# Patient Record
Sex: FEMALE | Race: WHITE | NOT HISPANIC OR LATINO | Employment: OTHER | URBAN - METROPOLITAN AREA
[De-identification: names, ages, dates, MRNs, and addresses within clinical notes are randomized per-mention and may not be internally consistent; named-entity substitution may affect disease eponyms.]

---

## 2018-09-04 ENCOUNTER — OFFICE VISIT (OUTPATIENT)
Dept: OPTOMETRY | Facility: CLINIC | Age: 63
End: 2018-09-04
Payer: COMMERCIAL

## 2018-09-04 DIAGNOSIS — H52.223 REGULAR ASTIGMATISM OF BOTH EYES: Primary | ICD-10-CM

## 2018-09-04 DIAGNOSIS — H52.4 PRESBYOPIA: ICD-10-CM

## 2018-09-04 PROCEDURE — 92015 DETERMINE REFRACTIVE STATE: CPT | Performed by: OPTOMETRIST

## 2018-09-04 PROCEDURE — 92014 COMPRE OPH EXAM EST PT 1/>: CPT | Performed by: OPTOMETRIST

## 2018-09-04 ASSESSMENT — VISUAL ACUITY
OD_SC+: -3
OD_CC+: -1
OS_CC: 20/25
OS_CC+: -1
OS_SC+: -1
METHOD: SNELLEN - LINEAR
OS_SC: 20/100
OS_SC: 20/50
OD_SC: 20/30
OD_SC: 20/50
CORRECTION_TYPE: GLASSES
OD_CC: 20/25

## 2018-09-04 ASSESSMENT — REFRACTION_MANIFEST
OD_SPHERE: -1.75
OS_CYLINDER: +1.75
OD_CYLINDER: +1.00
OS_AXIS: 160
METHOD_AUTOREFRACTION: 1
OD_ADD: +2.25
OS_CYLINDER: +1.25
OS_SPHERE: -1.00
OD_SPHERE: -1.75
OD_AXIS: 005
OD_CYLINDER: +0.50
OD_AXIS: 006
OS_AXIS: 165
OS_ADD: +2.25
OS_SPHERE: -1.25

## 2018-09-04 ASSESSMENT — REFRACTION_WEARINGRX
OD_SPHERE: -1.50
OD_CYLINDER: +0.75
OS_CYLINDER: +0.75
SPECS_TYPE: SVL DISTANCE
OS_SPHERE: +0.25
OD_AXIS: 010
OS_AXIS: 155
OD_CYLINDER: +0.75
OS_CYLINDER: +0.75
OD_SPHERE: -0.25
OS_AXIS: 155
OS_SPHERE: -1.00
OD_AXIS: 010

## 2018-09-04 ASSESSMENT — CONF VISUAL FIELD
METHOD: COUNTING FINGERS
OS_NORMAL: 1

## 2018-09-04 ASSESSMENT — TONOMETRY
OD_IOP_MMHG: 12
OS_IOP_MMHG: 12
IOP_METHOD: APPLANATION

## 2018-09-04 ASSESSMENT — KERATOMETRY
OS_K2POWER_DIOPTERS: 43.50
OS_AXISANGLE2_DEGREES: 139
OD_K1POWER_DIOPTERS: 44.25
OD_K2POWER_DIOPTERS: 44.75
OS_K1POWER_DIOPTERS: 43.75
OD_AXISANGLE2_DEGREES: 2

## 2018-09-04 ASSESSMENT — SLIT LAMP EXAM - LIDS
COMMENTS: NORMAL
COMMENTS: NORMAL

## 2018-09-04 ASSESSMENT — CUP TO DISC RATIO
OD_RATIO: 0.1
OS_RATIO: 0.1

## 2018-09-04 ASSESSMENT — EXTERNAL EXAM - LEFT EYE: OS_EXAM: NORMAL

## 2018-09-04 ASSESSMENT — EXTERNAL EXAM - RIGHT EYE: OD_EXAM: NORMAL

## 2018-09-04 NOTE — PATIENT INSTRUCTIONS
Patient was advised of today's exam findings.  Fill glasses prescription  Return in 1 year for eye exam    Sara Liu O.D.  [facility]   [address] [place], [place] [id number] [phone]

## 2018-09-04 NOTE — MR AVS SNAPSHOT
"              After Visit Summary   9/4/2018    Kaykay Rockwell    MRN: [id number]           Patient Information     Date Of Birth          1955        Visit Information        Provider Department      9/4/2018 9:40 AM Sara Liu OD [facility]        Today's Diagnoses     Regular astigmatism of both eyes    -  1    Presbyopia          Care Instructions    Patient was advised of today's exam findings.  Fill glasses prescription  Return in 1 year for eye exam    Sara Liu O.D.  [facility]   [address] [place] [address]  [phone]            Follow-ups after your visit        Who to contact     If you have questions or need follow up information about today's clinic visit or your schedule please contact [facility] directly at [phone].  Normal or non-critical lab and imaging results will be communicated to you by MyChart, letter or phone within 4 business days after the clinic has received the results. If you do not hear from us within 7 days, please contact the clinic through MyChart or phone. If you have a critical or abnormal lab result, we will notify you by phone as soon as possible.  Submit refill requests through [facility] or call your pharmacy and they will forward the refill request to us. Please allow 3 business days for your refill to be completed.          Additional Information About Your Visit        MyChart Information     [facility] lets you send messages to your doctor, view your test results, renew your prescriptions, schedule appointments and more. To sign up, go to [email or web address] . Click on \"Log in\" on the left side of the screen, which will take you to the Welcome page. Then click on \"Sign up Now\" on the right side of the page.     You will be asked to enter the access code listed below, as well as some personal information. Please follow the directions to create your username and password.     Your access code is: " [email or web address]  Expires: 12/3/2018 11:19 AM     Your access code will  in 90 days. If you need help or a new code, please call your [facility]3] or [phone].        Care EveryWhere ID     This is your Care EveryWhere ID. This could be used by other organizations to access your [place] medical records  [id number]         Blood Pressure from Last 3 Encounters:   14 130/81   03/24/10 116/76    Weight from Last 3 Encounters:   14 81.6 kg (180 lb)              Today, you had the following     No orders found for display       Primary Care Provider Office Phone # Fax #    [facility] [phone] [address] [facility] [address]        Equal Access to Services     HERBER HELM : Hadii riaz stewart hadasho Soomaali, waaxda luqadaha, qaybta kaalmada adeegyada, ibrahima craig hayjose francisco granados . So [facility] [phone].    ATENCIÓN: Si habla español, tiene a saldivar disposición servicios gratuitos de asistencia lingüística. Llame al [phone].    We comply with applicable federal civil rights laws and [place] laws. We do not discriminate on the basis of race, color, national origin, age, disability, sex, sexual orientation, or gender identity.            Thank you!     Thank you for choosing [facility]  for your care. Our goal is always to provide you with excellent care. Hearing back from our patients is one way we can continue to improve our services. Please take a few minutes to complete the written survey that you may receive in the mail after your visit with us. Thank you!             Your Updated Medication List - Protect others around you: Learn how to safely use, store and throw away your medicines at www.disposemymeds.org.          This list is accurate as of 18 11:19 AM.  Always use your most recent med list.                   Brand Name Dispense Instructions for use Diagnosis    ADVIL 200 MG tablet   Generic drug:  ibuprofen      Take  400 mg by mouth every 4 hours as needed for mild pain        aspirin 81 MG tablet      Take 1 tablet by mouth daily.        MULTI FOR HER 50+ PO      Take 1 tablet by mouth.

## 2018-09-04 NOTE — PROGRESS NOTES
Chief Complaint   Patient presents with     COMPREHENSIVE EYE EXAM         Last Eye Exam: 6/17/2015  Dilated Previously: Yes    What are you currently using to see?  Glasses, has 2 pairs. One that she wears to drive and for distance tasks and one that she rarely uses to read. She did not bring the reading pair to this visit        Distance Vision Acuity: Satisfied with vision, no changes, glasses still seem to work     Near Vision Acuity: Satisfied with vision while reading and using computer unaided and rarely for smaller print as needed   Eye Comfort: good  Do you use eye drops? : No  Occupation or Hobbies: Retired     [employer]           Medical, surgical and family histories reviewed and updated 9/4/2018.     Mother - glaucoma and macular degeneration     OBJECTIVE: See Ophthalmology exam    ASSESSMENT:    ICD-10-CM    1. Regular astigmatism of both eyes H52.223 EYE EXAM (SIMPLE-NONBILLABLE)     REFRACTION   2. Presbyopia H52.4 EYE EXAM (SIMPLE-NONBILLABLE)     REFRACTION      PLAN:     Patient Instructions   Patient was advised of today's exam findings.  Fill glasses prescription  Return in 1 year for eye exam    Sara Liu O.D.  [facility]   [address] [place] [address]  [phone]

## 2021-03-13 ENCOUNTER — HEALTH MAINTENANCE LETTER (OUTPATIENT)
Age: 66
End: 2021-03-13

## 2021-10-23 ENCOUNTER — HEALTH MAINTENANCE LETTER (OUTPATIENT)
Age: 66
End: 2021-10-23

## 2022-04-09 ENCOUNTER — HEALTH MAINTENANCE LETTER (OUTPATIENT)
Age: 67
End: 2022-04-09

## 2022-10-09 ENCOUNTER — HEALTH MAINTENANCE LETTER (OUTPATIENT)
Age: 67
End: 2022-10-09

## 2022-11-03 ENCOUNTER — OFFICE VISIT (OUTPATIENT)
Dept: FAMILY MEDICINE | Facility: CLINIC | Age: 67
End: 2022-11-03
Payer: COMMERCIAL

## 2022-11-03 VITALS
HEIGHT: 65 IN | WEIGHT: 134 LBS | TEMPERATURE: 97.4 F | HEART RATE: 92 BPM | SYSTOLIC BLOOD PRESSURE: 142 MMHG | BODY MASS INDEX: 22.33 KG/M2 | OXYGEN SATURATION: 97 % | DIASTOLIC BLOOD PRESSURE: 76 MMHG

## 2022-11-03 DIAGNOSIS — J01.00 ACUTE NON-RECURRENT MAXILLARY SINUSITIS: ICD-10-CM

## 2022-11-03 DIAGNOSIS — L98.9 NON-HEALING SKIN LESION: Primary | ICD-10-CM

## 2022-11-03 PROCEDURE — 99203 OFFICE O/P NEW LOW 30 MIN: CPT | Performed by: PHYSICIAN ASSISTANT

## 2022-11-03 RX ORDER — CEFUROXIME AXETIL 250 MG/1
250 TABLET ORAL 2 TIMES DAILY
Qty: 20 TABLET | Refills: 0 | Status: SHIPPED | OUTPATIENT
Start: 2022-11-03 | End: 2022-11-13

## 2022-11-03 ASSESSMENT — PAIN SCALES - GENERAL: PAINLEVEL: NO PAIN (0)

## 2022-11-03 NOTE — NURSING NOTE
"Chief Complaint   Patient presents with     Derm Problem       Initial BP (!) 166/89   Pulse 92   Temp 97.4  F (36.3  C) (Tympanic)   Ht 1.651 m (5' 5\")   Wt 60.8 kg (134 lb)   SpO2 97%   BMI 22.30 kg/m   Estimated body mass index is 22.3 kg/m  as calculated from the following:    Height as of this encounter: 1.651 m (5' 5\").    Weight as of this encounter: 60.8 kg (134 lb).  Medication Reconciliation: complete    ISIDRO Joya MA    "

## 2022-11-03 NOTE — PROGRESS NOTES
Assessment & Plan     Non-healing skin lesion  Multiple nonhealing lesions. She was unaware of the lesions on her scalp until today.   Unclear cause of the lesions, she is going to need consultation and possible biopsy per Dermatology.   - Adult Dermatology Referral; Future    Acute non-recurrent maxillary sinusitis  Sinus symptoms for over 2 weeks.   Treat with antibiotic.   Continue with symptomatic treatments with OTC medications also, rest and fluids.   - cefuroxime (CEFTIN) 250 MG tablet; Take 1 tablet (250 mg) by mouth 2 times daily for 10 days    She is due for routine preventative health exam.   Blood pressure was rechecked and significantly improved.   She has the ability to check at home and will keep a log.   She was scheduled for follow up and routine exam.              Return in about 3 weeks (around 11/24/2022) for recheck, Routine Visit.    Kristen M. Kehr, PA-C [facility]   Janee is a 67 year old, presenting for the following health issues:  Derm Problem and Headache    Janee is here today for skin changes that are occurring on her right leg. She was working in her yard in 5 months ago. She noticed some raised red bumps along her ankle and thought she must have been bitten by some bugs. She eventually developed larger red open sores, they would eventually scab and look dry and scaly. She never had pain, but she tends to itch them and pick at them and then they will be open. A fine layer of skin will form over the top of the sores and a few of them have healed completely. They are scarred and dark in color. More of the lesions continue to occur. There have been multiple on her right lower leg. The brown discoloration is also covering 1/2 of her leg. There has never been drainage from the open areas, no pain, swelling.   She does not come to the clinic regularly and does not have a history of any chronic illnesses. She denies a history of eczema or psoriasis.  "    She was a smoker. There have not been changes with her toes. No redness, pain. No sores on her toes or discoloration.           She also also recently on vacation in [place]. She was with a large group and they all got ill with mild URI symptoms. She has waited 2 weeks, but continues to have pressure and sinus pain on the right. Plugged ear at times.           History of Present Illness       Reason for visit:  Bites and discoloration on right ankle and shin    She eats 0-1 servings of fruits and vegetables daily.She consumes 1 sweetened beverage(s) daily.She exercises with enough effort to increase her heart rate 9 or less minutes per day.  She exercises with enough effort to increase her heart rate 3 or less days per week.   She is taking medications regularly.     Derm concern on right leg ongoing for 4 months. Also, off and on headaches for 6 weeks and check right ear.         Review of Systems   Constitutional, HEENT, cardiovascular, pulmonary, GI, , musculoskeletal, neuro, skin, endocrine and psych systems are negative, except as otherwise noted.      Objective    BP (!) 142/76   Pulse 92   Temp 97.4  F (36.3  C) (Tympanic)   Ht 1.651 m (5' 5\")   Wt 60.8 kg (134 lb)   SpO2 97%   BMI 22.30 kg/m    Body mass index is 22.3 kg/m .  Physical Exam   GENERAL: healthy, alert and no distress  EYES: Eyes grossly normal to inspection, PERRL and conjunctivae and sclerae normal  HENT: ear canals and TM's normal, nose and mouth without ulcers or lesions  NECK: no adenopathy, no asymmetry, masses, or scars and thyroid normal to palpation  RESP: lungs clear to auscultation - no rales, rhonchi or wheezes  CV: regular rate and rhythm, normal S1 S2, no S3 or S4, no murmur, click or rub, no peripheral edema and peripheral pulses strong  SKIN: Right lower extremity: there is a brown discoloration on the distal 1/2 of the leg. Dry open sores in various stages of healing present. No swelling or edema in the leg. Pulses in " the foot are intact. Toes are normal, Cap refill is brisk in toes.   Left leg: there is also mild discoloration from a lesion that has already healed on the posterior ankle.   Skin check: 2 similar dry lesions on her scalp.   All other skin is clear.   NEURO: Normal strength and tone, mentation intact and speech normal  PSYCH: mentation appears normal, affect normal/bright

## 2022-11-09 ENCOUNTER — OFFICE VISIT (OUTPATIENT)
Dept: DERMATOLOGY | Facility: CLINIC | Age: 67
End: 2022-11-09
Payer: COMMERCIAL

## 2022-11-09 DIAGNOSIS — R21 RASH: ICD-10-CM

## 2022-11-09 DIAGNOSIS — D49.2 NEOPLASM OF UNSPECIFIED BEHAVIOR OF BONE, SOFT TISSUE, AND SKIN: Primary | ICD-10-CM

## 2022-11-09 PROCEDURE — 87070 CULTURE OTHR SPECIMN AEROBIC: CPT | Performed by: PHYSICIAN ASSISTANT

## 2022-11-09 PROCEDURE — 11104 PUNCH BX SKIN SINGLE LESION: CPT | Performed by: PHYSICIAN ASSISTANT

## 2022-11-09 PROCEDURE — 88305 TISSUE EXAM BY PATHOLOGIST: CPT | Performed by: DERMATOLOGY

## 2022-11-09 PROCEDURE — 11105 PUNCH BX SKIN EA SEP/ADDL: CPT | Performed by: PHYSICIAN ASSISTANT

## 2022-11-09 PROCEDURE — 87181 SC STD AGAR DILUTION PER AGT: CPT | Performed by: PHYSICIAN ASSISTANT

## 2022-11-09 PROCEDURE — 88312 SPECIAL STAINS GROUP 1: CPT | Performed by: DERMATOLOGY

## 2022-11-09 PROCEDURE — 87077 CULTURE AEROBIC IDENTIFY: CPT | Performed by: PHYSICIAN ASSISTANT

## 2022-11-09 ASSESSMENT — PAIN SCALES - GENERAL: PAINLEVEL: MILD PAIN (2)

## 2022-11-09 NOTE — PROGRESS NOTES
[facility] Dermatology Note  Encounter Date: Nov 9, 2022  Office Visit     Dermatology Problem List:  0. NUB - scalp - bx 11/9/22  1. Rash - R lower leg - s/p bx 11/9/22   - bacterial culture obtained 11/9/22    ____________________________________________    Assessment & Plan:    # Rash on the right lower leg. The differential diagnosis includes prurigo nodules vs LSC vs dermatitis vs PG vs deep fungal infection vs other.   - Bacterial Culture obtained   - See procedure note.     # Neoplasm of unspecified behavior of the skin (D49.2) on the scalp. The differential diagnosis includes prurigo nodules vs LSC vs dermatitis vs PG vs deep fungal infection vs other.   - See procedure note.       Procedures Performed:   - Punch biopsy procedure note, location(s): scalp. After discussion of benefits and risks including but not limited to bleeding, infection, scar, incomplete removal, recurrence, and non-diagnostic biopsy, written consent and photographs were obtained. The area was cleaned with isopropyl alcohol. 0.5mL of 1% lidocaine with epinephrine was injected to obtain adequate anesthesia and a 4 mm punch biopsy was performed at site(s). 4-0 Prolene sutures were utilized to approximate the epidermal edges. White petrolatum ointment and a bandage was applied to the wound. Explicit verbal and written wound care instructions were provided. The patient left the dermatology clinic in good condition.     - Punch biopsy procedure note, location(s): right lower leg. After discussion of benefits and risks including but not limited to bleeding, infection, scar, incomplete removal, recurrence, and non-diagnostic biopsy, written consent and photographs were obtained. The area was cleaned with isopropyl alcohol. 0.5mL of 1% lidocaine with epinephrine was injected to obtain adequate anesthesia and a 4 mm punch biopsy was performed at site(s). 4-0 Prolene sutures were utilized to approximate the epidermal edges.  "White petrolatum ointment and a bandage was applied to the wound. Explicit verbal and written wound care instructions were provided. The patient left the dermatology clinic in good condition.       Follow-up: pending path results    Staff and Scribe:     Scribe Disclosure:   I, Antwanlane Gilliam, am serving as a scribe to document services personally performed by Alondra Bains PA-C, based on data collection and the provider's statements to me.    Provider Disclosure:   The documentation recorded by the scribe accurately reflects the services I personally performed and the decisions made by me.    All risks, benefits and alternatives were discussed with patient.  Patient is in agreement and understands the assessment and plan.  All questions were answered.    Alondra Bains PA-C, [facility]  [facility] [place]: Phone: [phone], Fax: [phone]  [facility]: Phone: [phone],  Fax: [phone]  [facility]: Phone: [phone], Fax: [phone]  ____________________________________________    CC: New Patient (Non-healing skin lesion; dry patches covering right lower extremity and 2 lesions on scalp)    HPI:  Ms. Kaykay Rockwell is a(n) 67 year old female who presents today as a new patient for a spot check.    Referred to derm by Kristen Kehr PA-C for non-healing skin lesion. Note from 11/3/22 reviewed. A&P notes \"Multiple nonhealing lesions. She was unaware of the lesions on her scalp until today. Unclear cause of the lesions, she is going to need consultation and possible biopsy per Dermatology.\"    Today, she presents for evaluation of non healing skin lesions and dry patches covering the right lower extremity. She also has 2 lesions on the scalp to be examined. She had been unaware of the lesions on her scalp until her PCP noticed these. She notes the right lower extremity has been an issue for roughly 4 " "months. This began \"like bites\" that gradually changed into a \"webbing\" like lesion and then gradually changed to what they are today. She notes a few areas have healed up. She does have one lesion on the left posterior ankle. She does admit to picking at these areas in bed. She has tried applying Neosporin. She does recall cleaning her bird feeder and bird baths without gloves right before these lesions began. She believes this may have caused her current lesions. No history of eczema.    Patient is otherwise feeling well, without additional concerns.    Labs:  NA    Physical Exam:  Vitals: There were no vitals taken for this visit.  SKIN: Focused examination of the scalp, right lower leg and left medial ankle was performed.  - On the mid scalp, there is a 1 cm pink scaly lesion, nontender  - On the right lower leg, she has some brown discoloration with 6 areas of thin erosion and scale and some hyperkeratosis, evidence of excoriation  - On the left medial ankle, there is a 2 cm round scaly patch with surrounding hyperpigmentation  - No other lesions of concern on areas examined.                           Medications:  Current Outpatient Medications   Medication     cefuroxime (CEFTIN) 250 MG tablet     ibuprofen (ADVIL/MOTRIN) 200 MG tablet     Multiple Vitamins-Minerals (MULTI FOR HER 50+ PO)     No current facility-administered medications for this visit.      Past Medical History:   Patient Active Problem List   Diagnosis     CARDIOVASCULAR SCREENING; LDL GOAL LESS THAN 130     Past Medical History:   Diagnosis Date     NO ACTIVE PROBLEMS         CC Kristen M Kehr, PA-C  [address] [place] [address] on close of this encounter.    "

## 2022-11-09 NOTE — PATIENT INSTRUCTIONS
Wound Care After a Biopsy    What is a skin biopsy?  A skin biopsy allows the doctor to examine a very small piece of tissue under the microscope to determine the diagnosis and the best treatment for the skin condition. A local anesthetic (numbing medicine)  is injected with a very small needle into the skin area to be tested. A small piece of skin is taken from the area. Sometimes a suture (stitch) is used.     What are the risks of a skin biopsy?  I will experience scar, bleeding, swelling, pain, crusting and redness. I may experience incomplete removal or recurrence. Risks of this procedure are excessive bleeding, bruising, infection, nerve damage, numbness, thick (hypertrophic or keloidal) scar and non-diagnostic biopsy.    How should I care for my wound for the first 24 hours?  Keep the wound dry and covered for 24 hours  If it bleeds, hold direct pressure on the area for 15 minutes. If bleeding does not stop then go to the emergency room  Avoid strenuous exercise the first 1-2 days or as your doctor instructs you    How should I care for the wound after 24 hours?  After 24 hours, remove the bandage  You may bathe or shower as normal  If you had a scalp biopsy, you can shampoo as usual and can use shower water to clean the biopsy site daily  Clean the wound twice a day with gentle soap and water  Do not scrub, be gentle  Apply white petroleum/Vaseline after cleaning the wound with a cotton swab or a clean finger, and keep the site covered with a Bandaid /bandage. Bandages are not necessary with a scalp biopsy  If you are unable to cover the site with a Bandaid /bandage, re-apply ointment 2-3 times a day to keep the site moist. Moisture will help with healing  Avoid strenuous activity for first 1-2 days  Avoid lakes, rivers, pools, and oceans until the stitches are removed or the site is healed    How do I clean my wound?  Wash hands thoroughly with soap or use hand  before all wound care  Clean the  wound with gentle soap and water  Apply white petroleum/Vaseline  to wound after it is clean  Replace the Bandaid /bandage to keep the wound covered for the first few days or as instructed by your doctor  If you had a scalp biopsy, warm shower water to the area on a daily basis should suffice    What should I use to clean my wound?   Cotton-tipped applicators (Qtips )  White petroleum jelly (Vaseline ). Use a clean new container and use Q-tips to apply.  Bandaids   as needed  Gentle soap     How should I care for my wound long term?  Do not get your wound dirty  Keep up with wound care for one week or until the area is healed.  A small scab will form and fall off by itself when the area is completely healed. The area will be red and will become pink in color as it heals. Sun protection is very important for how your scar will turn out. Sunscreen with an SPF 30 or greater is recommended once the area is healed.  If you have stitches, stitches need to be removed in 10 days. You may return to our clinic for this or you may have it done locally at your doctor s office.  You should have some soreness but it should be mild and slowly go away over several days. Talk to your doctor about using tylenol for pain,    When should I call my doctor?  If you have increased:   Pain or swelling  Pus or drainage (clear or slightly yellow drainage is ok)  Temperature over 100F  Spreading redness or warmth around wound    When will I hear about my results?  The biopsy results can take 2 weeks to come back.  Your results will automatically release to [employer] before your provider has even reviewed them.  The clinic will call you with the results, send you a [employer] message, or have you schedule a follow-up clinic or phone time to discuss the results.  Contact our clinics if you do not hear from us in 2 weeks.    Who should I call with questions?  [facility]: [phone]  [facility],  [place]: [phone]  For urgent needs outside of business hours call the [facility] at [phone] and ask for the dermatology resident on call

## 2022-11-09 NOTE — NURSING NOTE
Kaykay Rockwell's chief complaint for this visit includes:  Chief Complaint   Patient presents with     New Patient     Non-healing skin lesion; dry patches covering right lower extremity and 2 lesions on scalp     PCP: [facility]    Referring Provider:  Kristen M Kehr, PA-C  [address] [place] [address]    There were no vitals taken for this visit.  Mild Pain (2)        Allergies   Allergen Reactions     Augmentin Itching and Rash     redness         Do you need any medication refills at today's visit? Roslyn Green LPN

## 2022-11-09 NOTE — NURSING NOTE
The following medication was given:     MEDICATION:  Lidocaine with epinephrine 1% 1:[id number]  ROUTE: SQ  SITE: see procedure note  DOSE: 2.5cc  LOT #: [id number]  : [employer]  EXPIRATION DATE: 08/23  NDC#: 8643-5500-29  Was there drug waste? 1.5cc  Multi-dose vial: Yes    Pat Green LPN  November 9, 2022

## 2022-11-09 NOTE — LETTER
11/9/2022         RE: Kaykay Rockwell  [address]  [place] [address]        Dear Colleague,    Thank you for referring your patient, Kaykay Rockwell, to the [facility]. Please see a copy of my visit note below.    [facility] Dermatology Note  Encounter Date: Nov 9, 2022  Office Visit     Dermatology Problem List:  0. NUB - scalp - bx 11/9/22  1. Rash - R lower leg - s/p bx 11/9/22   - bacterial culture obtained 11/9/22    ____________________________________________    Assessment & Plan:    # Rash on the right lower leg. The differential diagnosis includes prurigo nodules vs LSC vs dermatitis vs PG vs deep fungal infection vs other.   - Bacterial Culture obtained   - See procedure note.     # Neoplasm of unspecified behavior of the skin (D49.2) on the scalp. The differential diagnosis includes prurigo nodules vs LSC vs dermatitis vs PG vs deep fungal infection vs other.   - See procedure note.       Procedures Performed:   - Punch biopsy procedure note, location(s): scalp. After discussion of benefits and risks including but not limited to bleeding, infection, scar, incomplete removal, recurrence, and non-diagnostic biopsy, written consent and photographs were obtained. The area was cleaned with isopropyl alcohol. 0.5mL of 1% lidocaine with epinephrine was injected to obtain adequate anesthesia and a 4 mm punch biopsy was performed at site(s). 4-0 Prolene sutures were utilized to approximate the epidermal edges. White petrolatum ointment and a bandage was applied to the wound. Explicit verbal and written wound care instructions were provided. The patient left the dermatology clinic in good condition.     - Punch biopsy procedure note, location(s): right lower leg. After discussion of benefits and risks including but not limited to bleeding, infection, scar, incomplete removal, recurrence, and non-diagnostic biopsy, written consent and photographs were  "obtained. The area was cleaned with isopropyl alcohol. 0.5mL of 1% lidocaine with epinephrine was injected to obtain adequate anesthesia and a 4 mm punch biopsy was performed at site(s). 4-0 Prolene sutures were utilized to approximate the epidermal edges. White petrolatum ointment and a bandage was applied to the wound. Explicit verbal and written wound care instructions were provided. The patient left the dermatology clinic in good condition.       Follow-up: pending path results    Staff and Scribe:     Scribe Disclosure:   I, Antwan Gilliam, am serving as a scribe to document services personally performed by Alondra Bains PA-C, based on data collection and the provider's statements to me.    Provider Disclosure:   The documentation recorded by the scribe accurately reflects the services I personally performed and the decisions made by me.    All risks, benefits and alternatives were discussed with patient.  Patient is in agreement and understands the assessment and plan.  All questions were answered.    Alondra Bains PA-C, [facility]  [facility] [place]: Phone: [phone], Fax: [phone]  [facility]: Phone: [phone],  Fax: [phone]  [facility]: Phone: [phone], Fax: [phone]  ____________________________________________    CC: New Patient (Non-healing skin lesion; dry patches covering right lower extremity and 2 lesions on scalp)    HPI:  Ms. Kaykay Rockwell is a(n) 67 year old female who presents today as a new patient for a spot check.    Referred to derm by Kristen Kehr PA-C for non-healing skin lesion. Note from 11/3/22 reviewed. A&P notes \"Multiple nonhealing lesions. She was unaware of the lesions on her scalp until today. Unclear cause of the lesions, she is going to need consultation and possible biopsy per Dermatology.\"    Today, she presents for evaluation of non healing skin " "lesions and dry patches covering the right lower extremity. She also has 2 lesions on the scalp to be examined. She had been unaware of the lesions on her scalp until her PCP noticed these. She notes the right lower extremity has been an issue for roughly 4 months. This began \"like bites\" that gradually changed into a \"webbing\" like lesion and then gradually changed to what they are today. She notes a few areas have healed up. She does have one lesion on the left posterior ankle. She does admit to picking at these areas in bed. She has tried applying Neosporin. She does recall cleaning her bird feeder and bird baths without gloves right before these lesions began. She believes this may have caused her current lesions. No history of eczema.    Patient is otherwise feeling well, without additional concerns.    Labs:  NA    Physical Exam:  Vitals: There were no vitals taken for this visit.  SKIN: Focused examination of the scalp, right lower leg and left medial ankle was performed.  - On the mid scalp, there is a 1 cm pink scaly lesion, nontender  - On the right lower leg, she has some brown discoloration with 6 areas of thin erosion and scale and some hyperkeratosis, evidence of excoriation  - On the left medial ankle, there is a 2 cm round scaly patch with surrounding hyperpigmentation  - No other lesions of concern on areas examined.                           Medications:  Current Outpatient Medications   Medication     cefuroxime (CEFTIN) 250 MG tablet     ibuprofen (ADVIL/MOTRIN) 200 MG tablet     Multiple Vitamins-Minerals (MULTI FOR HER 50+ PO)     No current facility-administered medications for this visit.      Past Medical History:   Patient Active Problem List   Diagnosis     CARDIOVASCULAR SCREENING; LDL GOAL LESS THAN 130     Past Medical History:   Diagnosis Date     NO ACTIVE PROBLEMS         CC Kristen M Kehr, PA-C  [address] [place] [address] on close of this encounter.        Again, thank " you for allowing me to participate in the care of your patient.        Sincerely,        Alondra Bains PA-C

## 2022-11-12 LAB
BACTERIA SPEC CULT: ABNORMAL
BACTERIA SPEC CULT: ABNORMAL

## 2022-11-14 DIAGNOSIS — L98.9 NON-HEALING SKIN LESION: Primary | ICD-10-CM

## 2022-11-14 RX ORDER — GENTAMICIN SULFATE 1 MG/G
OINTMENT TOPICAL 3 TIMES DAILY
Qty: 30 G | Refills: 1 | Status: SHIPPED | OUTPATIENT
Start: 2022-11-14

## 2022-11-15 LAB
PATH REPORT.COMMENTS IMP SPEC: NORMAL
PATH REPORT.COMMENTS IMP SPEC: NORMAL
PATH REPORT.FINAL DX SPEC: NORMAL
PATH REPORT.GROSS SPEC: NORMAL
PATH REPORT.MICROSCOPIC SPEC OTHER STN: NORMAL
PATH REPORT.RELEVANT HX SPEC: NORMAL

## 2022-11-16 DIAGNOSIS — L28.0 LSC (LICHEN SIMPLEX CHRONICUS): Primary | ICD-10-CM

## 2022-11-16 DIAGNOSIS — L28.1 PRURIGO NODULARIS: ICD-10-CM

## 2022-11-16 RX ORDER — CLOBETASOL PROPIONATE 0.5 MG/G
OINTMENT TOPICAL
Qty: 60 G | Refills: 1 | Status: SHIPPED | OUTPATIENT
Start: 2022-11-16

## 2022-11-27 ASSESSMENT — ENCOUNTER SYMPTOMS
CONSTIPATION: 1
CHILLS: 1
BREAST MASS: 0
HEADACHES: 1
DIARRHEA: 1

## 2022-11-27 ASSESSMENT — ACTIVITIES OF DAILY LIVING (ADL): CURRENT_FUNCTION: NO ASSISTANCE NEEDED

## 2022-11-30 ENCOUNTER — OFFICE VISIT (OUTPATIENT)
Dept: FAMILY MEDICINE | Facility: CLINIC | Age: 67
End: 2022-11-30
Payer: COMMERCIAL

## 2022-11-30 VITALS
BODY MASS INDEX: 22.66 KG/M2 | DIASTOLIC BLOOD PRESSURE: 86 MMHG | WEIGHT: 136 LBS | HEART RATE: 87 BPM | TEMPERATURE: 96.5 F | HEIGHT: 65 IN | OXYGEN SATURATION: 98 % | SYSTOLIC BLOOD PRESSURE: 162 MMHG | RESPIRATION RATE: 16 BRPM

## 2022-11-30 DIAGNOSIS — Z00.00 ENCOUNTER FOR MEDICARE ANNUAL WELLNESS EXAM: Primary | ICD-10-CM

## 2022-11-30 DIAGNOSIS — Z12.11 SCREEN FOR COLON CANCER: ICD-10-CM

## 2022-11-30 DIAGNOSIS — Z78.0 POSTMENOPAUSAL STATUS: ICD-10-CM

## 2022-11-30 DIAGNOSIS — Z13.220 SCREENING FOR HYPERLIPIDEMIA: ICD-10-CM

## 2022-11-30 DIAGNOSIS — Z11.59 NEED FOR HEPATITIS C SCREENING TEST: ICD-10-CM

## 2022-11-30 DIAGNOSIS — K59.00 CONSTIPATION, UNSPECIFIED CONSTIPATION TYPE: ICD-10-CM

## 2022-11-30 DIAGNOSIS — Z12.31 VISIT FOR SCREENING MAMMOGRAM: ICD-10-CM

## 2022-11-30 DIAGNOSIS — I10 ESSENTIAL HYPERTENSION: ICD-10-CM

## 2022-11-30 DIAGNOSIS — Z13.1 SCREENING FOR DIABETES MELLITUS: ICD-10-CM

## 2022-11-30 PROCEDURE — 99213 OFFICE O/P EST LOW 20 MIN: CPT | Mod: 25 | Performed by: PHYSICIAN ASSISTANT

## 2022-11-30 PROCEDURE — 99397 PER PM REEVAL EST PAT 65+ YR: CPT | Performed by: PHYSICIAN ASSISTANT

## 2022-11-30 RX ORDER — LISINOPRIL 10 MG/1
10 TABLET ORAL DAILY
Qty: 60 TABLET | Refills: 1 | Status: SHIPPED | OUTPATIENT
Start: 2022-11-30 | End: 2023-01-17

## 2022-11-30 ASSESSMENT — PAIN SCALES - GENERAL: PAINLEVEL: MILD PAIN (3)

## 2022-11-30 ASSESSMENT — ENCOUNTER SYMPTOMS
CHILLS: 1
DIARRHEA: 1
BREAST MASS: 0
CONSTIPATION: 1
HEADACHES: 1

## 2022-11-30 ASSESSMENT — ACTIVITIES OF DAILY LIVING (ADL): CURRENT_FUNCTION: NO ASSISTANCE NEEDED

## 2022-11-30 NOTE — PATIENT INSTRUCTIONS
SCHEDULE MAMMOGRAM  SCHEDULE FASTING LAB APPOINTMENT  SCHEDULE DEXA (BONE DENSITY)                  Patient Education   Personalized Prevention Plan  You are due for the preventive services outlined below.  Your care team is available to assist you in scheduling these services.  If you have already completed any of these items, please share that information with your care team to update in your medical record.  Health Maintenance Due   Topic Date Due    Talk to your care team about options to quit tobacco use.  Never done    Osteoporosis Screening  Never done    ANNUAL REVIEW OF HM ORDERS  Never done    Discuss Advance Care Planning  Never done    Mammogram  Never done    Pneumococcal Vaccine (1 - PCV) Never done    Colorectal Cancer Screening  Never done    Hepatitis C Screening  Never done    Cholesterol Lab  Never done    Zoster (Shingles) Vaccine (1 of 2) Never done    LUNG CANCER SCREENING  Never done    Eye Exam  09/04/2019    Annual Wellness Visit  Never done

## 2022-11-30 NOTE — NURSING NOTE
"Chief Complaint   Patient presents with     Wellness Visit       Initial BP (!) 146/88   Pulse 87   Temp (!) 96.5  F (35.8  C) (Tympanic)   Resp 16   Ht 1.651 m (5' 5\")   Wt 61.7 kg (136 lb)   LMP  (LMP Unknown)   SpO2 98%   BMI 22.63 kg/m   Estimated body mass index is 22.63 kg/m  as calculated from the following:    Height as of this encounter: 1.651 m (5' 5\").    Weight as of this encounter: 61.7 kg (136 lb).  Medication Reconciliation: complete    ISIDRO Joya MA    "

## 2022-11-30 NOTE — PROGRESS NOTES
"SUBJECTIVE:   Janee is a 67 year old who presents for Preventive Visit.  Patient has been advised of split billing requirements and indicates understanding: Yes  Are you in the first 12 months of your Medicare coverage?  No    Healthy Habits:     In general, how would you rate your overall health?  Fair    Frequency of exercise:  None    Do you usually eat at least 4 servings of fruit and vegetables a day, include whole grains    & fiber and avoid regularly eating high fat or \"junk\" foods?  No    Taking medications regularly:  Yes    Ability to successfully perform activities of daily living:  No assistance needed    Home Safety:  No safety concerns identified    Hearing Impairment:  No hearing concerns    In the past 6 months, have you been bothered by leaking of urine?  No    In general, how would you rate your overall mental or emotional health?  Good      PHQ-2 Total Score: 0      Have you ever done Advance Care Planning? (For example, a Health Directive, POLST, or a discussion with a medical provider or your loved ones about your wishes): No, advance care planning information given to patient to review.  Patient declined advance care planning discussion at this time.       Fall risk  Fallen 2 or more times in the past year?: No  Any fall with injury in the past year?: No    Cognitive Screening Cognitive Screening Cognition normal during direct observation during interview and exam      Do you have sleep apnea, excessive snoring or daytime drowsiness?: no    Reviewed and updated as needed this visit by clinical staff   Tobacco  Allergies  Meds              Reviewed and updated as needed this visit by Provider                 Social History     Tobacco Use     Smoking status: Some Days     Packs/day: 0.50     Years: 39.00     Pack years: 19.50     Types: Cigarettes     Smokeless tobacco: Never   Substance Use Topics     Alcohol use: Yes     Comment: occ.         Alcohol Use 11/27/2022   Prescreen: >3 " drinks/day or >7 drinks/week? No               Current providers sharing in care for this patient include:   Patient Care Team:  [facility] - General (Clinic)  Kehr, Kristen M, PA-C as Referring Physician (Family Medicine)  Kurtis Jarrell MD as MD (Dermatology)  Kehr, Kristen M, PA-C as Assigned PCP  Alondra Bains PA-C as Assigned Surgical Provider    The following health maintenance items are reviewed in Epic and correct as of today:  Health Maintenance   Topic Date Due     DEXA  Never done     ANNUAL REVIEW OF HM ORDERS  Never done     ADVANCE CARE PLANNING  Never done     MAMMO SCREENING  Never done     Pneumococcal Vaccine: 65+ Years (1 - PCV) Never done     COLORECTAL CANCER SCREENING  Never done     HEPATITIS C SCREENING  Never done     LIPID  Never done     ZOSTER IMMUNIZATION (1 of 2) Never done     LUNG CANCER SCREENING  Never done     EYE EXAM  09/04/2019     MEDICARE ANNUAL WELLNESS VISIT  Never done     NICOTINE/TOBACCO CESSATION COUNSELING Q 1 YR  11/30/2023     FALL RISK ASSESSMENT  11/30/2023     DTAP/TDAP/TD IMMUNIZATION (2 - Td or Tdap) 10/26/2027     PHQ-2 (once per calendar year)  Completed     INFLUENZA VACCINE  Completed     COVID-19 Vaccine  Completed     IPV IMMUNIZATION  Aged Out     MENINGITIS IMMUNIZATION  Aged Out     BP Readings from Last 3 Encounters:   11/30/22 (!) 162/86   11/03/22 (!) 142/76   07/22/14 130/81    Wt Readings from Last 3 Encounters:   11/30/22 61.7 kg (136 lb)   11/03/22 60.8 kg (134 lb)   07/22/14 81.6 kg (180 lb)                  Patient Active Problem List   Diagnosis     CARDIOVASCULAR SCREENING; LDL GOAL LESS THAN 130     Past Surgical History:   Procedure Laterality Date     BIOPSY  November, 2022     ORTHOPEDIC SURGERY  2010     SURGICAL HISTORY OF -       left shoulder torn rotator     TONSILLECTOMY         Social History     Tobacco Use     Smoking status: Some Days     Packs/day: 0.50     Years: 39.00     Pack years:  19.50     Types: Cigarettes     Smokeless tobacco: Never   Substance Use Topics     Alcohol use: Yes     Comment: occ.     Family History   Problem Relation Age of Onset     Diabetes Mother      Heart Disease Mother      Hypertension Mother      Cancer Mother      Glaucoma Mother 80        drops     Macular Degeneration Mother         loss of vison     Depression Mother      Diabetes Father      Hypertension Father      Cancer Father      Cerebrovascular Disease No family hx of      Thyroid Disease No family hx of          Current Outpatient Medications   Medication Sig Dispense Refill     lisinopril (ZESTRIL) 10 MG tablet Take 1 tablet (10 mg) by mouth daily 60 tablet 1     clobetasol (TEMOVATE) 0.05 % external ointment Apply topically BID to the AA on the legs, do not use over open skin. Repeat daily for 2 weeks. Then use intermittently thereafter. Avoid use on the face, neck, groin, and underarms. 60 g 1     gentamicin (GARAMYCIN) 0.1 % external ointment Apply topically 3 times daily 30 g 1     ibuprofen (ADVIL/MOTRIN) 200 MG tablet Take 400 mg by mouth every 4 hours as needed for mild pain       Multiple Vitamins-Minerals (MULTI FOR HER 50+ PO) Take 1 tablet by mouth.       Allergies   Allergen Reactions     Augmentin Itching and Rash     redness     No lab results found.       Breast CA Risk Assessment (FHS-7) 11/27/2022   Do you have a family history of breast, colon, or ovarian cancer? No / Unknown         Mammogram Screening: Recommended mammography every 1-2 years with patient discussion and risk factor consideration  Pertinent mammograms are reviewed under the imaging tab.    Review of Systems   Constitutional: Positive for chills.   Gastrointestinal: Positive for constipation and diarrhea.   Breasts:  Negative for tenderness, breast mass and discharge.   Genitourinary: Negative for pelvic pain, vaginal bleeding and vaginal discharge.   Neurological: Positive for headaches.         OBJECTIVE:   BP (!)  "146/88   Pulse 87   Temp (!) 96.5  F (35.8  C) (Tympanic)   Resp 16   Ht 1.651 m (5' 5\")   Wt 61.7 kg (136 lb)   LMP  (LMP Unknown)   SpO2 98%   BMI 22.63 kg/m   Estimated body mass index is 22.63 kg/m  as calculated from the following:    Height as of this encounter: 1.651 m (5' 5\").    Weight as of this encounter: 61.7 kg (136 lb).  Physical Exam  GENERAL APPEARANCE: healthy, alert and no distress  EYES: Eyes grossly normal to inspection, PERRL and conjunctivae and sclerae normal  HENT: ear canals and TM's normal, nose and mouth without ulcers or lesions, oropharynx clear and oral mucous membranes moist  NECK: no adenopathy, no asymmetry, masses, or scars and thyroid normal to palpation  RESP: lungs clear to auscultation - no rales, rhonchi or wheezes  BREAST: normal without masses, tenderness or nipple discharge and no palpable axillary masses or adenopathy  CV: regular rate and rhythm, normal S1 S2, no S3 or S4, no murmur, click or rub, no peripheral edema and peripheral pulses strong  ABDOMEN: soft, nontender, no hepatosplenomegaly, no masses and bowel sounds normal  MS: no musculoskeletal defects are noted and gait is age appropriate without ataxia  SKIN: no suspicious lesions or rashes  NEURO: Normal strength and tone, sensory exam grossly normal, mentation intact and speech normal  PSYCH: mentation appears normal and affect normal/bright    Diagnostic Test Results:  Labs reviewed in Epic    ASSESSMENT / PLAN:   (Z00.00) Encounter for Medicare annual wellness exam  (primary encounter diagnosis)  Comment: Health maintenance reviewed and updated.  Checking for coverage for vaccines  Plan: Lipid panel reflex to direct LDL Non-fasting            (I10) Essential hypertension  Comment: add lisinopril 10 mg daily  Recheck in 1-2 months.   Side effects and how to take the medication discussed.  She is able to check at home and will keep a log of readings also.    Plan: lisinopril (ZESTRIL) 10 MG tablet        "     (Z12.31) Visit for screening mammogram  Comment: Plan: MA SCREENING DIGITAL BILAT - Future  (s+30)          (Z12.11) Screen for colon cancer  Plan: Colonoscopy Screening  Referral            (Z11.59) Need for hepatitis C screening test  Plan: Hepatitis C Screen Reflex to HCV RNA Quant and         Genotype            (Z13.220) Screening for hyperlipidemia  Plan: Lipid panel reflex to direct LDL Non-fasting            (K59.00) Constipation, unspecified constipation type  Discussed adding fiber, increasing water and regular activity.   Can also try miralax if needed.   Plan: Basic metabolic panel  (Ca, Cl, CO2, Creat,         Gluc, K, Na, BUN), TSH with free T4 reflex            (Z13.1) Screening for diabetes mellitus      (Z78.0) Postmenopausal status  Plan: DX Hip/Pelvis/Spine              Patient has been advised of split billing requirements and indicates understanding: Yes      COUNSELING:  Reviewed preventive health counseling, as reflected in patient instructions       Regular exercise       Healthy diet/nutrition       Osteoporosis prevention/bone health       Colon cancer screening       Hepatitis C screening        She reports that she has been smoking cigarettes. She has a 19.50 pack-year smoking history. She has never used smokeless tobacco.  Nicotine/Tobacco Cessation Plan:   unable to address. Will address at next appointment      Appropriate preventive services were discussed with this patient, including applicable screening as appropriate for cardiovascular disease, diabetes, osteopenia/osteoporosis, and glaucoma.  As appropriate for age/gender, discussed screening for colorectal cancer, prostate cancer, breast cancer, and cervical cancer. Checklist reviewing preventive services available has been given to the patient.    Reviewed patients plan of care and provided an AVS. The Basic Care Plan (routine screening as documented in Health Maintenance) for Kaykay meets the Care Plan requirement.  This Care Plan has been established and reviewed with the Patient.      Kristen M. Kehr, PA-C  [facility]    Identified Health Risks:

## 2022-12-14 ENCOUNTER — ANCILLARY PROCEDURE (OUTPATIENT)
Dept: BONE DENSITY | Facility: CLINIC | Age: 67
End: 2022-12-14
Attending: PHYSICIAN ASSISTANT
Payer: COMMERCIAL

## 2022-12-14 ENCOUNTER — ANCILLARY PROCEDURE (OUTPATIENT)
Dept: MAMMOGRAPHY | Facility: CLINIC | Age: 67
End: 2022-12-14
Attending: PHYSICIAN ASSISTANT
Payer: COMMERCIAL

## 2022-12-14 DIAGNOSIS — Z78.0 POSTMENOPAUSAL STATUS: ICD-10-CM

## 2022-12-14 DIAGNOSIS — Z12.31 VISIT FOR SCREENING MAMMOGRAM: ICD-10-CM

## 2022-12-14 PROCEDURE — 77067 SCR MAMMO BI INCL CAD: CPT | Mod: TC | Performed by: RADIOLOGY

## 2022-12-14 PROCEDURE — 77080 DXA BONE DENSITY AXIAL: CPT | Performed by: INTERNAL MEDICINE

## 2022-12-27 ENCOUNTER — MYC MEDICAL ADVICE (OUTPATIENT)
Dept: CALL CENTER | Age: 67
End: 2022-12-27

## 2022-12-29 ENCOUNTER — ANCILLARY PROCEDURE (OUTPATIENT)
Dept: MAMMOGRAPHY | Facility: CLINIC | Age: 67
End: 2022-12-29
Attending: PHYSICIAN ASSISTANT
Payer: COMMERCIAL

## 2022-12-29 DIAGNOSIS — R92.8 ABNORMAL MAMMOGRAM: ICD-10-CM

## 2022-12-29 PROCEDURE — 77066 DX MAMMO INCL CAD BI: CPT | Performed by: RADIOLOGY

## 2022-12-29 PROCEDURE — G0279 TOMOSYNTHESIS, MAMMO: HCPCS | Performed by: RADIOLOGY

## 2023-01-11 ENCOUNTER — LAB (OUTPATIENT)
Dept: LAB | Facility: CLINIC | Age: 68
End: 2023-01-11
Payer: COMMERCIAL

## 2023-01-11 DIAGNOSIS — Z13.220 SCREENING FOR HYPERLIPIDEMIA: ICD-10-CM

## 2023-01-11 DIAGNOSIS — Z11.59 NEED FOR HEPATITIS C SCREENING TEST: ICD-10-CM

## 2023-01-11 DIAGNOSIS — Z00.00 ENCOUNTER FOR MEDICARE ANNUAL WELLNESS EXAM: ICD-10-CM

## 2023-01-11 DIAGNOSIS — K59.00 CONSTIPATION, UNSPECIFIED CONSTIPATION TYPE: ICD-10-CM

## 2023-01-11 LAB
ANION GAP SERPL CALCULATED.3IONS-SCNC: 6 MMOL/L (ref 3–14)
BUN SERPL-MCNC: 19 MG/DL (ref 7–30)
CALCIUM SERPL-MCNC: 9.2 MG/DL (ref 8.5–10.1)
CHLORIDE BLD-SCNC: 100 MMOL/L (ref 94–109)
CHOLEST SERPL-MCNC: 168 MG/DL
CO2 SERPL-SCNC: 30 MMOL/L (ref 20–32)
CREAT SERPL-MCNC: 0.64 MG/DL (ref 0.52–1.04)
FASTING STATUS PATIENT QL REPORTED: YES
GFR SERPL CREATININE-BSD FRML MDRD: >90 ML/MIN/1.73M2
GLUCOSE BLD-MCNC: 288 MG/DL (ref 70–99)
HCV AB SERPL QL IA: NONREACTIVE
HDLC SERPL-MCNC: 58 MG/DL
LDLC SERPL CALC-MCNC: 89 MG/DL
NONHDLC SERPL-MCNC: 110 MG/DL
POTASSIUM BLD-SCNC: 4.1 MMOL/L (ref 3.4–5.3)
SODIUM SERPL-SCNC: 136 MMOL/L (ref 133–144)
TRIGL SERPL-MCNC: 106 MG/DL
TSH SERPL DL<=0.005 MIU/L-ACNC: 2.39 MU/L (ref 0.4–4)

## 2023-01-11 PROCEDURE — 84443 ASSAY THYROID STIM HORMONE: CPT

## 2023-01-11 PROCEDURE — 36415 COLL VENOUS BLD VENIPUNCTURE: CPT

## 2023-01-11 PROCEDURE — 80061 LIPID PANEL: CPT

## 2023-01-11 PROCEDURE — 80048 BASIC METABOLIC PNL TOTAL CA: CPT

## 2023-01-11 PROCEDURE — 86803 HEPATITIS C AB TEST: CPT

## 2023-01-12 ENCOUNTER — ANCILLARY PROCEDURE (OUTPATIENT)
Dept: MAMMOGRAPHY | Facility: CLINIC | Age: 68
End: 2023-01-12
Attending: PHYSICIAN ASSISTANT
Payer: COMMERCIAL

## 2023-01-12 DIAGNOSIS — R92.8 ABNORMAL MAMMOGRAM: ICD-10-CM

## 2023-01-12 PROCEDURE — 19082 BX BREAST ADD LESION STRTCTC: CPT | Mod: RT | Performed by: STUDENT IN AN ORGANIZED HEALTH CARE EDUCATION/TRAINING PROGRAM

## 2023-01-12 PROCEDURE — G0279 TOMOSYNTHESIS, MAMMO: HCPCS

## 2023-01-12 PROCEDURE — 88305 TISSUE EXAM BY PATHOLOGIST: CPT | Mod: GC | Performed by: PATHOLOGY

## 2023-01-12 PROCEDURE — 19081 BX BREAST 1ST LESION STRTCTC: CPT | Mod: LT | Performed by: STUDENT IN AN ORGANIZED HEALTH CARE EDUCATION/TRAINING PROGRAM

## 2023-01-12 PROCEDURE — 77066 DX MAMMO INCL CAD BI: CPT

## 2023-01-12 RX ORDER — IOPAMIDOL 755 MG/ML
131 INJECTION, SOLUTION INTRAVASCULAR ONCE
Status: COMPLETED | OUTPATIENT
Start: 2023-01-12 | End: 2023-01-12

## 2023-01-12 RX ADMIN — IOPAMIDOL 74 ML: 755 INJECTION, SOLUTION INTRAVASCULAR at 13:19

## 2023-01-16 LAB
PATH REPORT.COMMENTS IMP SPEC: NORMAL
PATH REPORT.COMMENTS IMP SPEC: NORMAL
PATH REPORT.FINAL DX SPEC: NORMAL
PATH REPORT.GROSS SPEC: NORMAL
PATH REPORT.MICROSCOPIC SPEC OTHER STN: NORMAL
PATH REPORT.RELEVANT HX SPEC: NORMAL
PHOTO IMAGE: NORMAL

## 2023-01-17 ENCOUNTER — TELEPHONE (OUTPATIENT)
Dept: FAMILY MEDICINE | Facility: CLINIC | Age: 68
End: 2023-01-17

## 2023-01-17 ENCOUNTER — OFFICE VISIT (OUTPATIENT)
Dept: FAMILY MEDICINE | Facility: CLINIC | Age: 68
End: 2023-01-17
Payer: COMMERCIAL

## 2023-01-17 ENCOUNTER — TELEPHONE (OUTPATIENT)
Dept: MAMMOGRAPHY | Facility: CLINIC | Age: 68
End: 2023-01-17

## 2023-01-17 VITALS
TEMPERATURE: 97.3 F | HEART RATE: 91 BPM | DIASTOLIC BLOOD PRESSURE: 72 MMHG | RESPIRATION RATE: 16 BRPM | SYSTOLIC BLOOD PRESSURE: 130 MMHG | WEIGHT: 136 LBS | OXYGEN SATURATION: 99 % | BODY MASS INDEX: 22.66 KG/M2 | HEIGHT: 65 IN

## 2023-01-17 DIAGNOSIS — R73.01 ELEVATED FASTING BLOOD SUGAR: Primary | ICD-10-CM

## 2023-01-17 DIAGNOSIS — Z12.11 SCREEN FOR COLON CANCER: ICD-10-CM

## 2023-01-17 DIAGNOSIS — I10 ESSENTIAL HYPERTENSION: ICD-10-CM

## 2023-01-17 PROCEDURE — 99213 OFFICE O/P EST LOW 20 MIN: CPT | Performed by: PHYSICIAN ASSISTANT

## 2023-01-17 RX ORDER — LISINOPRIL 10 MG/1
10 TABLET ORAL DAILY
Qty: 90 TABLET | Refills: 3 | Status: SHIPPED | OUTPATIENT
Start: 2023-01-17 | End: 2023-06-01

## 2023-01-17 ASSESSMENT — PAIN SCALES - GENERAL: PAINLEVEL: NO PAIN (0)

## 2023-01-17 NOTE — TELEPHONE ENCOUNTER
Lab only appointment scheduled for 1/30/23 at 9:45 am.  Sheri CALDERÓN    [facility]

## 2023-01-17 NOTE — TELEPHONE ENCOUNTER
Please contact Janee and schedule a fasting lab appointment.   I spoke with her already and she is expecting the call.   Thank you.   Kristen Kehr PA-C

## 2023-01-17 NOTE — NURSING NOTE
"Chief Complaint   Patient presents with     Hypertension       Initial BP (!) 142/72   Pulse 91   Temp 97.3  F (36.3  C) (Tympanic)   Resp 16   Ht 1.651 m (5' 5\")   Wt 61.7 kg (136 lb)   LMP  (LMP Unknown)   SpO2 99%   BMI 22.63 kg/m   Estimated body mass index is 22.63 kg/m  as calculated from the following:    Height as of this encounter: 1.651 m (5' 5\").    Weight as of this encounter: 61.7 kg (136 lb).  Medication Reconciliation: complete    ISIDRO Joya MA    "

## 2023-01-17 NOTE — PROGRESS NOTES
"  Assessment & Plan     Essential hypertension  Doing well on the lisinopril 10 mg   Refills given   Plan follow up in 1 year  - lisinopril (ZESTRIL) 10 MG tablet; Take 1 tablet (10 mg) by mouth daily    Screen for colon cancer  Schedule next screening test.   - Colonoscopy Screening  Referral; Future    Discussed results of breast biopsy, she will be getting a call from the Radiology Dept also as far as follow up plan.                  Return in about 1 year (around 1/17/2024) for Routine Visit, medication check.    Kristen M. Kehr, PA-C [facility]    Sivakumar Weller is a 67 year old, presenting for the following health issues:  Hypertension      She started the lisinopril 10 mg a few months ago.   She reports no side effects.   She is nervous today about her breast biopsy results.     History of Present Illness       Hypertension: She presents for follow up of hypertension.  She does check blood pressure  regularly outside of the clinic. Outside blood pressures have been over 140/90. She does not follow a low salt diet.               Review of Systems         Objective    /72   Pulse 91   Temp 97.3  F (36.3  C) (Tympanic)   Resp 16   Ht 1.651 m (5' 5\")   Wt 61.7 kg (136 lb)   LMP  (LMP Unknown)   SpO2 99%   BMI 22.63 kg/m    Body mass index is 22.63 kg/m .  Physical Exam   GENERAL: healthy, alert and no distress  MS: no gross musculoskeletal defects noted, no edema  PSYCH: mentation appears normal, affect normal/bright                    "

## 2023-01-17 NOTE — TELEPHONE ENCOUNTER
Spoke to Janee about the benign findings from her breast biopsies done last week.  We discussed the Radiologist's recommendation of continuing on with her yearly screening mammograms.  Janee verbalized understanding and all questions and concerns were answered at this time.

## 2023-01-30 ENCOUNTER — LAB (OUTPATIENT)
Dept: LAB | Facility: CLINIC | Age: 68
End: 2023-01-30
Payer: COMMERCIAL

## 2023-01-30 DIAGNOSIS — R73.01 ELEVATED FASTING BLOOD SUGAR: ICD-10-CM

## 2023-01-30 LAB
FASTING STATUS PATIENT QL REPORTED: YES
GLUCOSE BLD-MCNC: 248 MG/DL (ref 70–99)
HBA1C MFR BLD: 11 % (ref 0–5.6)

## 2023-01-30 PROCEDURE — 36415 COLL VENOUS BLD VENIPUNCTURE: CPT

## 2023-01-30 PROCEDURE — 83036 HEMOGLOBIN GLYCOSYLATED A1C: CPT

## 2023-01-30 PROCEDURE — 82947 ASSAY GLUCOSE BLOOD QUANT: CPT

## 2023-01-31 DIAGNOSIS — E11.9 TYPE 2 DIABETES MELLITUS WITHOUT COMPLICATION, WITHOUT LONG-TERM CURRENT USE OF INSULIN (H): Primary | ICD-10-CM

## 2023-01-31 RX ORDER — METFORMIN HCL 500 MG
TABLET, EXTENDED RELEASE 24 HR ORAL
Qty: 180 TABLET | Refills: 1 | Status: SHIPPED | OUTPATIENT
Start: 2023-01-31 | End: 2023-03-27

## 2023-01-31 NOTE — PROGRESS NOTES
Patient contacted clinic and RN relayed message via PCP to start Metformin 1 tablet then increase to 2 tablets. Patient agrees and will make an appointment with PCP after meeting with diabetic educator.     Shayna HUITRON RN, BSN   [facility] [place]

## 2023-02-09 ENCOUNTER — ALLIED HEALTH/NURSE VISIT (OUTPATIENT)
Dept: EDUCATION SERVICES | Facility: CLINIC | Age: 68
End: 2023-02-09
Payer: COMMERCIAL

## 2023-02-09 DIAGNOSIS — E11.9 DIABETES MELLITUS WITHOUT COMPLICATION (H): Primary | ICD-10-CM

## 2023-02-09 DIAGNOSIS — E11.9 TYPE 2 DIABETES MELLITUS WITHOUT COMPLICATION, WITHOUT LONG-TERM CURRENT USE OF INSULIN (H): ICD-10-CM

## 2023-02-09 PROCEDURE — 99207 PR NO CHARGE NURSE ONLY: CPT

## 2023-02-09 PROCEDURE — G0108 DIAB MANAGE TRN  PER INDIV: HCPCS

## 2023-02-09 RX ORDER — BLOOD-GLUCOSE METER
KIT MISCELLANEOUS
Qty: 50 STRIP | Refills: 11 | Status: SHIPPED | OUTPATIENT
Start: 2023-02-09 | End: 2023-03-27

## 2023-02-09 RX ORDER — LANCETS
1 EACH MISCELLANEOUS 2 TIMES DAILY
Qty: 100 EACH | Refills: 11 | Status: SHIPPED | OUTPATIENT
Start: 2023-02-09

## 2023-02-09 RX ORDER — BLOOD SUGAR DIAGNOSTIC
STRIP MISCELLANEOUS
Qty: 200 STRIP | Refills: 11 | Status: SHIPPED | OUTPATIENT
Start: 2023-02-09

## 2023-02-09 RX ORDER — LANCING DEVICE/LANCETS
KIT MISCELLANEOUS
Qty: 1 EACH | Refills: 0 | Status: SHIPPED | OUTPATIENT
Start: 2023-02-09 | End: 2023-03-27

## 2023-02-09 RX ORDER — LANCETS
EACH MISCELLANEOUS
Qty: 102 EACH | Refills: 11 | Status: SHIPPED | OUTPATIENT
Start: 2023-02-09 | End: 2023-03-27

## 2023-02-09 NOTE — LETTER
2/9/2023         RE: Kaykay Rockwell  [address]  [place] [address]        Dear Colleague,    Thank you for referring your patient, Kaykay Rockwell, to the [facility]. Please see a copy of my visit note below.    Diabetes Self-Management Education & Support    Presents for: Initial Assessment for new diagnosis    Type of Service: In Person Visit    Assessment Type:   ASSESSMENT:  Janee is here newly diagnosed with a high A1c of 11.0 she had no idea what that meant she had no idea how high her blood sugars were we discussed how she should follow-up with her primary she said she was not given any instructions so I did tell her to make an appointment to see her primary in 3 to 6 months for follow-up A1c in the meantime I will be working with her to try to keep her educated today she struggled she said she has to really read everything to take it only when she was a bit overwhelmed I kept most of the education today pertinent trying to get her a meter tell her about metformin and meal planning I did show her diagrams of what diabetes does to the body and we will keep using that over and over very nice lady but very high anxiety she will do well with a lot of support    Patient's most recent   Lab Results   Component Value Date    A1C 11.0 01/30/2023     is not meeting goal of <7.0    Diabetes knowledge and skills assessment:   Patient is knowledgeable in diabetes management concepts related to: Taking Medication and Healthy Coping    Continue education with the following diabetes management concepts: Healthy Eating, Being Active, Monitoring, Taking Medication and Problem Solving    Based on learning assessment above, most appropriate setting for further diabetes education would be: Individual setting.      PLAN  1. Metformin  mg take 2 tabs with dinner.   2. Check your BG when you wake up  () and 2 hrs after a meal (<180)  3. Eating 3 meals per day  45-60 gms with  "each meal and 2-3 snacks per day 15-30 gms with each snack and proteins with all meals    Topics to cover at upcoming visits: Healthy Eating, Being Active, Monitoring and Taking Medication    Follow-up: in 5 wks    See Care Plan for co-developed, patient-state behavior change goals.  AVS provided for patient today.    Education Materials Provided:   [employer] [place] Understanding Diabetes Booklet, Carbohydrate Counting and My Plate Planner      SUBJECTIVE/OBJECTIVE:  Presents for: Initial Assessment for new diagnosis  Accompanied by: Self  Diabetes education in the past 24mo: No  Focus of Visit: Diabetes Pathophysiology, Taking Medication, Healthy Eating, Monitoring  Diabetes type: Type 2  Date of diagnosis: 1/2022  How confident are you filling out medical forms by yourself:: Quite a bit  Diabetes management related comments/concerns: Should I have testing equipment? Will I get what is necessary on 2/9/23?  Transportation concerns: No  Difficulty affording diabetes medication?: No  Difficulty affording diabetes testing supplies?: No  Other concerns:: None  Cultural Influences/Ethnic Background:  Not  or       Diabetes Symptoms & Complications:  Fatigue: Yes  Neuropathy: Sometimes  Polydipsia: No  Polyphagia: No  Polyuria: No  Visual change: Sometimes  Slow healing wounds: No  Weight trend: Stable       Patient Problem List and Family Medical History reviewed for relevant medical history, current medical status, and diabetes risk factors.    Vitals:  There were no vitals taken for this visit.  Estimated body mass index is 22.63 kg/m  as calculated from the following:    Height as of 1/17/23: 1.651 m (5' 5\").    Weight as of 1/17/23: 61.7 kg (136 lb).   Last 3 BP:   BP Readings from Last 3 Encounters:   01/17/23 130/72   11/30/22 (!) 162/86   11/03/22 (!) 142/76       History   Smoking Status     Former     Packs/day: 0.50     Years: 39.00     Types: Cigarettes   Smokeless Tobacco     Never "       Labs:  Lab Results   Component Value Date    A1C 11.0 01/30/2023     Lab Results   Component Value Date     01/30/2023     Lab Results   Component Value Date    LDL 89 01/11/2023     Direct Measure HDL   Date Value Ref Range Status   01/11/2023 58 >=50 mg/dL Final   ]  GFR Estimate   Date Value Ref Range Status   01/11/2023 >90 >60 mL/min/1.73m2 Final     Comment:     Effective December 21, 2021 eGFRcr in adults is calculated using the 2021 CKD-EPI creatinine equation which includes age and gender (Allyson et al., NEJ, DOI: 10.1056/XEMVsv8113768)     No results found for: GFRESTBLACK  Lab Results   Component Value Date    CR 0.64 01/11/2023     No results found for: MICROALBUMIN    Healthy Eating:  Healthy Eating Assessed Today: Yes  Meal planning/habits: Avoiding sweets, Heart healthy, Low salt  How many times a week on average do you eat food made away from home (restaurant/take-out)?: 2  Meals include: Breakfast, Lunch, Dinner, Morning Snack, Afternoon Snack, Evening Snack  Breakfast: cheerios and fruit or yogurt and fruit, or adding granola  Lunch: salad, or left overs from dinner or a sandwich and grilled cheese  Dinner: spinach salad with cheese and chx, or mexican foods, carnitas burito, or salmon and new potatoes  Snacks: handful of nuts, or apple slices and cheese  Beverages: Water, Tea, Coffee, Diet soda  Has patient met with a dietitian in the past?: No    Being Active:  Days per week of moderate to strenuous exercise (like a brisk walk): 5  On average, minutes per day of exercise at this level: 30  How intense was your typical exercise? : Moderate (like brisk walking)  Exercise Minutes per Week: 150  Barrier to exercise: None    Monitoring:       Taught meter and BG was 195    Taking Medications:  Diabetes Medication(s)     Biguanides       metFORMIN (GLUCOPHAGE XR) 500 MG 24 hr tablet    1 tablet daily with dinner for 1-2 weeks, then increase to 2 tablets daily with dinner          Current  Treatments: Oral Medication (taken by mouth)    Problem Solving:                 Reducing Risks:  CAD Risks: Diabetes Mellitus, Family history, Hypertension  Has dilated eye exam at least once a year?: No  Sees dentist every 6 months?: Yes  Feet checked by healthcare provider in the last year?: No    Healthy Coping:  Informal Support system:: Children, Family, Friends, Spouse  Patient Activation Measure Survey Score:  No flowsheet data found.      Care Plan and Education Provided:  Patient was instructed on Accu-Chek Guide Me meter and was able to provide an accurate return demonstration. Patient's blood glucose reading today was 195 mg/dL.  Care Plan: Diabetes   Updates made by Ellen Min RN since 2/9/2023 12:00 AM      Problem: HbA1C Not In Goal       Goal: Establish Regular Follow-Ups with PCP    Start Date: 2/9/2023   This Visit's Progress: 0%      Task: Discuss with PCP the recommended timing for patient's next follow up visit(s) Completed 2/9/2023   Responsible User: Ellen Min RN      Task: Discuss schedule for PCP visits with patient Completed 2/9/2023   Responsible User: Ellen Min RN      Goal: Get HbA1C Level in Goal    Start Date: 2/9/2023   This Visit's Progress: 10%      Task: Educate patient on diabetes education self-management topics Completed 2/9/2023   Responsible User: Ellen Min RN      Task: Educate patient on benefits of regular glucose monitoring Completed 2/9/2023   Responsible User: Ellen Min RN      Task: Refer patient to appropriate extended care team member, as needed (Medication Therapy Management, Behavioral Health, Physical Therapy, etc.)    Responsible User: Ellen Min RN      Task: Discuss diabetes treatment plan with patient Completed 2/9/2023   Responsible User: Ellen Min RN      Problem: Diabetes Self-Management Education Needed to Optimize Self-Care Behaviors       Goal: Understand diabetes pathophysiology and disease progression    Start  Date: 2/9/2023   This Visit's Progress: 0%      Task: Provide education on diabetes pathophysiology and disease progression specfic to patient's diabetes type Completed 2/9/2023   Responsible User: Ellen Min RN      Goal: Healthy Eating - follow a healthy eating pattern for diabetes    Start Date: 2/9/2023   This Visit's Progress: 40%      Task: Provide education on portion control and consistency in amount, composition and timing of food intake Completed 2/9/2023   Responsible User: Ellen Min RN      Task: Provide education on managing carbohydrate intake (carbohydrate counting, plate planning method, etc.) Completed 2/9/2023   Responsible User: Ellen Min RN      Task: Provide education on weight management    Responsible User: Ellen Min RN      Task: Provide education on heart healthy eating    Responsible User: Ellen Min RN      Task: Provide education on eating out    Responsible User: Ellen Min RN      Task: Develop individualized healthy eating plan with patient Completed 2/9/2023   Responsible User: Ellen Min RN      Goal: Being Active - get regular physical activity, working up to at least 150 minutes per week       Task: Provide education on relationship of activity to glucose and precautions to take if at risk for low glucose    Responsible User: Ellen Min RN      Task: Discuss barriers to physical activity with patient    Responsible User: Ellen Min RN      Task: Develop physical activity plan with patient    Responsible User: Ellen Min RN      Task: Explore community resources including walking groups, assistance programs, and home videos    Responsible User: Ellen Min RN      Goal: Monitoring - monitor glucose and ketones as directed       Task: Provide education on blood glucose monitoring (purpose, proper technique, frequency, glucose targets, interpreting results, when to use glucose control solution, sharps disposal)    Responsible  User: Ellen Min RN      Task: Provide education on continuous glucose monitoring (sensor placement, use of jesus or /reader, understanding glucose trends, alerts and alarms, differences between sensor glucose and blood glucose)    Responsible User: Ellen Min RN      Task: Provide education on ketone monitoring (when to monitor, frequency, etc.)    Responsible User: Ellen Min RN      Goal: Taking Medication - patient is consistently taking medications as directed    Start Date: 2/9/2023   This Visit's Progress: 20%      Task: Provide education on action of prescribed medication, including when to take and possible side effects Completed 2/9/2023   Responsible User: Ellen Min RN      Task: Provide education on insulin and injectable diabetes medications, including administration, storage, site selection and rotation for injection sites    Responsible User: Ellen Min RN      Task: Discuss barriers to medication adherence with patient and provide management technique ideas as appropriate    Responsible User: Ellen Min RN      Task: Provide education on frequency and refill details of medications Completed 2/9/2023   Responsible User: Ellen Min RN      Goal: Problem Solving - know how to prevent and manage short-term diabetes complications       Task: Provide education on high blood glucose - causes, signs/symptoms, prevention and treatment    Responsible User: Ellen Min RN      Task: Provide education on low blood glucose - causes, signs/symptoms, prevention, treatment, carrying a carbohydrate source at all times, and medical identification    Responsible User: Ellen Min RN      Task: Provide education on safe travel with diabetes    Responsible User: Ellen Min RN      Task: Provide education on how to care for diabetes on sick days    Responsible User: Ellen Min RN      Task: Provide education on when to call a health care provider    Responsible  User: Ellen Min RN      Goal: Reducing Risks - know how to prevent and treat long-term diabetes complications       Task: Provide education on major complications of diabetes, prevention, early diagnostic measures and treatment of complications    Responsible User: Ellen Min RN      Task: Provide education on recommended care for dental, eye and foot health    Responsible User: Ellen Min RN      Task: Provide education on Hemoglobin A1c - goals and relationship to blood glucose levels    Responsible User: Ellen Min RN      Task: Provide education on recommendations for heart health - lipid levels and goals, blood pressure and goals, and aspirin therapy, if indicated    Responsible User: Ellen Min RN      Task: Provide education on tobacco cessation    Responsible User: Ellen Min RN      Goal: Healthy Coping - use available resources to cope with the challenges of managing diabetes    Start Date: 2/9/2023   This Visit's Progress: 50%      Task: Discuss recognizing feelings about having diabetes Completed 2/9/2023   Responsible User: Ellen Min RN      Task: Provide education on the benefits of making appropriate lifestyle changes Completed 2/9/2023   Responsible User: Ellen Min RN      Task: Provide education on benefits of utilizing support systems    Responsible User: Ellen Min RN      Task: Discuss methods for coping with stress Completed 2/9/2023   Responsible User: Ellen Min RN      Task: Provide education on when to seek professional counseling    Responsible User: Ellen Min RN Sue Truhler RN/DARNELL Martini Diabetes Educator      Time Spent: 60 minutes  Encounter Type: Individual    Any diabetes medication dose changes were made via the CDE Protocol per the patient's primary care provider. A copy of this encounter was shared with the provider.

## 2023-02-09 NOTE — PROGRESS NOTES
Diabetes Self-Management Education & Support    Presents for: Initial Assessment for new diagnosis    Type of Service: In Person Visit    Assessment Type:   ASSESSMENT:  Janee is here newly diagnosed with a high A1c of 11.0 she had no idea what that meant she had no idea how high her blood sugars were we discussed how she should follow-up with her primary she said she was not given any instructions so I did tell her to make an appointment to see her primary in 3 to 6 months for follow-up A1c in the meantime I will be working with her to try to keep her educated today she struggled she said she has to really read everything to take it only when she was a bit overwhelmed I kept most of the education today pertinent trying to get her a meter tell her about metformin and meal planning I did show her diagrams of what diabetes does to the body and we will keep using that over and over very nice lady but very high anxiety she will do well with a lot of support    Patient's most recent   Lab Results   Component Value Date    A1C 11.0 01/30/2023     is not meeting goal of <7.0    Diabetes knowledge and skills assessment:   Patient is knowledgeable in diabetes management concepts related to: Taking Medication and Healthy Coping    Continue education with the following diabetes management concepts: Healthy Eating, Being Active, Monitoring, Taking Medication and Problem Solving    Based on learning assessment above, most appropriate setting for further diabetes education would be: Individual setting.      PLAN  1. Metformin  mg take 2 tabs with dinner.   2. Check your BG when you wake up  () and 2 hrs after a meal (<180)  3. Eating 3 meals per day  45-60 gms with each meal and 2-3 snacks per day 15-30 gms with each snack and proteins with all meals    Topics to cover at upcoming visits: Healthy Eating, Being Active, Monitoring and Taking Medication    Follow-up: in 5 wks    See Care Plan for co-developed,  "patient-state behavior change goals.  AVS provided for patient today.    Education Materials Provided:  [employer] [facility] Understanding Diabetes Booklet, Carbohydrate Counting and My Plate Planner      SUBJECTIVE/OBJECTIVE:  Presents for: Initial Assessment for new diagnosis  Accompanied by: Self  Diabetes education in the past 24mo: No  Focus of Visit: Diabetes Pathophysiology, Taking Medication, Healthy Eating, Monitoring  Diabetes type: Type 2  Date of diagnosis: 1/2022  How confident are you filling out medical forms by yourself:: Quite a bit  Diabetes management related comments/concerns: Should I have testing equipment? Will I get what is necessary on 2/9/23?  Transportation concerns: No  Difficulty affording diabetes medication?: No  Difficulty affording diabetes testing supplies?: No  Other concerns:: None  Cultural Influences/Ethnic Background:  Not  or       Diabetes Symptoms & Complications:  Fatigue: Yes  Neuropathy: Sometimes  Polydipsia: No  Polyphagia: No  Polyuria: No  Visual change: Sometimes  Slow healing wounds: No  Weight trend: Stable       Patient Problem List and Family Medical History reviewed for relevant medical history, current medical status, and diabetes risk factors.    Vitals:  There were no vitals taken for this visit.  Estimated body mass index is 22.63 kg/m  as calculated from the following:    Height as of 1/17/23: 1.651 m (5' 5\").    Weight as of 1/17/23: 61.7 kg (136 lb).   Last 3 BP:   BP Readings from Last 3 Encounters:   01/17/23 130/72   11/30/22 (!) 162/86   11/03/22 (!) 142/76       History   Smoking Status     Former     Packs/day: 0.50     Years: 39.00     Types: Cigarettes   Smokeless Tobacco     Never       Labs:  Lab Results   Component Value Date    A1C 11.0 01/30/2023     Lab Results   Component Value Date     01/30/2023     Lab Results   Component Value Date    LDL 89 01/11/2023     Direct Measure HDL   Date Value Ref Range Status   01/11/2023 " 58 >=50 mg/dL Final   ]  GFR Estimate   Date Value Ref Range Status   01/11/2023 >90 >60 mL/min/1.73m2 Final     Comment:     Effective December 21, 2021 eGFRcr in adults is calculated using the 2021 CKD-EPI creatinine equation which includes age and gender (Allyson burrell al., [place], DOI: 10.1056/CSPSgc3732086)     No results found for: GFRESTBLACK  Lab Results   Component Value Date    CR 0.64 01/11/2023     No results found for: MICROALBUMIN    Healthy Eating:  Healthy Eating Assessed Today: Yes  Meal planning/habits: Avoiding sweets, Heart healthy, Low salt  How many times a week on average do you eat food made away from home (restaurant/take-out)?: 2  Meals include: Breakfast, Lunch, Dinner, Morning Snack, Afternoon Snack, Evening Snack  Breakfast: cheerios and fruit or yogurt and fruit, or adding granola  Lunch: salad, or left overs from dinner or a sandwich and grilled cheese  Dinner: spinach salad with cheese and chx, or mexican foods, carnitas burito, or salmon and new potatoes  Snacks: handful of nuts, or apple slices and cheese  Beverages: Water, Tea, Coffee, Diet soda  Has patient met with a dietitian in the past?: No    Being Active:  Days per week of moderate to strenuous exercise (like a brisk walk): 5  On average, minutes per day of exercise at this level: 30  How intense was your typical exercise? : Moderate (like brisk walking)  Exercise Minutes per Week: 150  Barrier to exercise: None    Monitoring:       Taught meter and BG was 195    Taking Medications:  Diabetes Medication(s)     Biguanides       metFORMIN (GLUCOPHAGE XR) 500 MG 24 hr tablet    1 tablet daily with dinner for 1-2 weeks, then increase to 2 tablets daily with dinner          Current Treatments: Oral Medication (taken by mouth)    Problem Solving:                 Reducing Risks:  CAD Risks: Diabetes Mellitus, Family history, Hypertension  Has dilated eye exam at least once a year?: No  Sees dentist every 6 months?: Yes  Feet checked by  healthcare provider in the last year?: No    Healthy Coping:  Informal Support system:: Children, Family, Friends, Spouse  Patient Activation Measure Survey Score:  No flowsheet data found.      Care Plan and Education Provided:  Patient was instructed on Accu-Chek Guide Me meter and was able to provide an accurate return demonstration. Patient's blood glucose reading today was 195 mg/dL.  Care Plan: Diabetes   Updates made by Ellen Min RN since 2/9/2023 12:00 AM      Problem: HbA1C Not In Goal       Goal: Establish Regular Follow-Ups with PCP    Start Date: 2/9/2023   This Visit's Progress: 0%      Task: Discuss with PCP the recommended timing for patient's next follow up visit(s) Completed 2/9/2023   Responsible User: Ellen Min RN      Task: Discuss schedule for PCP visits with patient Completed 2/9/2023   Responsible User: Ellen Min RN      Goal: Get HbA1C Level in Goal    Start Date: 2/9/2023   This Visit's Progress: 10%      Task: Educate patient on diabetes education self-management topics Completed 2/9/2023   Responsible User: Ellen Min RN      Task: Educate patient on benefits of regular glucose monitoring Completed 2/9/2023   Responsible User: Ellen Min RN      Task: Refer patient to appropriate extended care team member, as needed (Medication Therapy Management, Behavioral Health, Physical Therapy, etc.)    Responsible User: Ellen Min RN      Task: Discuss diabetes treatment plan with patient Completed 2/9/2023   Responsible User: Ellen Min RN      Problem: Diabetes Self-Management Education Needed to Optimize Self-Care Behaviors       Goal: Understand diabetes pathophysiology and disease progression    Start Date: 2/9/2023   This Visit's Progress: 0%      Task: Provide education on diabetes pathophysiology and disease progression specfic to patient's diabetes type Completed 2/9/2023   Responsible User: Ellen Min RN      Goal: Healthy Eating - follow a  healthy eating pattern for diabetes    Start Date: 2/9/2023   This Visit's Progress: 40%      Task: Provide education on portion control and consistency in amount, composition and timing of food intake Completed 2/9/2023   Responsible User: Ellen Min RN      Task: Provide education on managing carbohydrate intake (carbohydrate counting, plate planning method, etc.) Completed 2/9/2023   Responsible User: Ellen Min RN      Task: Provide education on weight management    Responsible User: Ellen Min RN      Task: Provide education on heart healthy eating    Responsible User: Ellen Min RN      Task: Provide education on eating out    Responsible User: Ellen Mni RN      Task: Develop individualized healthy eating plan with patient Completed 2/9/2023   Responsible User: Ellen Min RN      Goal: Being Active - get regular physical activity, working up to at least 150 minutes per week       Task: Provide education on relationship of activity to glucose and precautions to take if at risk for low glucose    Responsible User: Ellen Min RN      Task: Discuss barriers to physical activity with patient    Responsible User: Ellen Min RN      Task: Develop physical activity plan with patient    Responsible User: Ellen Min RN      Task: Explore community resources including walking groups, assistance programs, and home videos    Responsible User: Ellen Min RN      Goal: Monitoring - monitor glucose and ketones as directed       Task: Provide education on blood glucose monitoring (purpose, proper technique, frequency, glucose targets, interpreting results, when to use glucose control solution, sharps disposal)    Responsible User: Ellne Min RN      Task: Provide education on continuous glucose monitoring (sensor placement, use of jesus or /reader, understanding glucose trends, alerts and alarms, differences between sensor glucose and blood glucose)    Responsible  User: Ellen Min RN      Task: Provide education on ketone monitoring (when to monitor, frequency, etc.)    Responsible User: Ellen Min RN      Goal: Taking Medication - patient is consistently taking medications as directed    Start Date: 2/9/2023   This Visit's Progress: 20%      Task: Provide education on action of prescribed medication, including when to take and possible side effects Completed 2/9/2023   Responsible User: Ellen Min RN      Task: Provide education on insulin and injectable diabetes medications, including administration, storage, site selection and rotation for injection sites    Responsible User: Ellen Min RN      Task: Discuss barriers to medication adherence with patient and provide management technique ideas as appropriate    Responsible User: Ellen Min RN      Task: Provide education on frequency and refill details of medications Completed 2/9/2023   Responsible User: Ellen Min RN      Goal: Problem Solving - know how to prevent and manage short-term diabetes complications       Task: Provide education on high blood glucose - causes, signs/symptoms, prevention and treatment    Responsible User: Ellen Min RN      Task: Provide education on low blood glucose - causes, signs/symptoms, prevention, treatment, carrying a carbohydrate source at all times, and medical identification    Responsible User: Ellen Min RN      Task: Provide education on safe travel with diabetes    Responsible User: Ellen Min RN      Task: Provide education on how to care for diabetes on sick days    Responsible User: Ellen Min RN      Task: Provide education on when to call a health care provider    Responsible User: Ellen Min RN      Goal: Reducing Risks - know how to prevent and treat long-term diabetes complications       Task: Provide education on major complications of diabetes, prevention, early diagnostic measures and treatment of complications     Responsible User: Ellen Min RN      Task: Provide education on recommended care for dental, eye and foot health    Responsible User: Ellen Min RN      Task: Provide education on Hemoglobin A1c - goals and relationship to blood glucose levels    Responsible User: Ellen Min RN      Task: Provide education on recommendations for heart health - lipid levels and goals, blood pressure and goals, and aspirin therapy, if indicated    Responsible User: Ellen Min RN      Task: Provide education on tobacco cessation    Responsible User: Ellen Min RN      Goal: Healthy Coping - use available resources to cope with the challenges of managing diabetes    Start Date: 2/9/2023   This Visit's Progress: 50%      Task: Discuss recognizing feelings about having diabetes Completed 2/9/2023   Responsible User: Ellen Min RN      Task: Provide education on the benefits of making appropriate lifestyle changes Completed 2/9/2023   Responsible User: Ellen Min RN      Task: Provide education on benefits of utilizing support systems    Responsible User: Ellen Min RN      Task: Discuss methods for coping with stress Completed 2/9/2023   Responsible User: Ellen Min RN      Task: Provide education on when to seek professional counseling    Responsible User: Ellen Min RN Sue Truhler RN/DARNELL Martini Diabetes Educator      Time Spent: 60 minutes  Encounter Type: Individual    Any diabetes medication dose changes were made via the CDE Protocol per the patient's primary care provider. A copy of this encounter was shared with the provider.

## 2023-02-09 NOTE — PATIENT INSTRUCTIONS
Diabetes Support Resources:  Metformin  mg take 2 tabs with dinner.   2. Check your BG when you wake up  () and 2 hrs after a meal (<180)  3. Eating 3 meals per day  45-60 gms with each meal and 2-3 snacks per day 15-30 gms with each snack and proteins with all meals        Bring blood glucose meter and logbook with you to all doctor and follow-up appointments.    Diabetes Education Telephone Visit Follow-up:    We realize your time is valuable and your health is important! We offer a convenient Telephone Visit follow up! It s a quick way to check in for a medication dose adjustment without having to come back to clinic as soon.    Telephone Visits are often covered by insurance. Please check with your insurance plan to see if this type of visit is covered. If not, the cost is less expensive than an office visit:    Up to 10 minutes (Code 27665): $30  11-20 minutes (Code 94293): $59  More than 20 minutes (Code 71988): $85    Talk with your Diabetes Educator if you want to learn more.      [facility]:  For Your Diabetes Education and Nutrition Appointments Call:  [phone]   For Diabetes Education or Nutrition Related Questions:   Phone: [phone]  Send [email or web address] Message   If you need a medication refill please contact your pharmacy. Please allow 3 business days for your refills to be completed.

## 2023-02-10 ENCOUNTER — TELEPHONE (OUTPATIENT)
Dept: FAMILY MEDICINE | Facility: CLINIC | Age: 68
End: 2023-02-10
Payer: COMMERCIAL

## 2023-02-10 NOTE — TELEPHONE ENCOUNTER
:    Can you please reach out to Janee and help her get scheduled for an appointment with me in the clinic for 3 months for diabetes follow up after starting medication.     When I saw her, follow up was going to out longer, but she as been diagnosed with diabetes since.   With the new medication start, a follow up will need to be scheduled for 3 months.       Thank you very much.   Kristen Kehr PA-C

## 2023-02-10 NOTE — TELEPHONE ENCOUNTER
Called patient and patient wanted us to call back later as she was not home at the moment.   Rosa Hurd,     [facility]

## 2023-02-10 NOTE — TELEPHONE ENCOUNTER
Patient is scheduled.  Future Appointments   Date Time Provider Department Center   5/8/2023  9:00 AM Kehr, Kristen M, PA-C [facility]     Rosa Hurd,     [facility]

## 2023-03-27 ENCOUNTER — ALLIED HEALTH/NURSE VISIT (OUTPATIENT)
Dept: EDUCATION SERVICES | Facility: CLINIC | Age: 68
End: 2023-03-27
Payer: COMMERCIAL

## 2023-03-27 DIAGNOSIS — E11.9 TYPE 2 DIABETES MELLITUS WITHOUT COMPLICATION, WITHOUT LONG-TERM CURRENT USE OF INSULIN (H): ICD-10-CM

## 2023-03-27 PROCEDURE — G0108 DIAB MANAGE TRN  PER INDIV: HCPCS

## 2023-03-27 RX ORDER — METFORMIN HCL 500 MG
1000 TABLET, EXTENDED RELEASE 24 HR ORAL 2 TIMES DAILY WITH MEALS
Qty: 360 TABLET | Refills: 1 | Status: SHIPPED | OUTPATIENT
Start: 2023-03-27 | End: 2023-09-18

## 2023-03-27 NOTE — LETTER
3/27/2023         RE: Kaykay Rockwell  [address]  [place] [address]        Dear Colleague,    Thank you for referring your patient, Kaykay Rockwell, to the [facility]. Please see a copy of my visit note below.    Diabetes Self-Management Education & Support    Presents for: Follow-up    Type of Service: In Person Visit    Assessment Type:   ASSESSMENT:  Liat is here today to learn more about how to manage her diabetes we spent a lot of time on meal planning she said she was struggling with that and was actually avoiding some meals and some foods she will work on these and mixing proteins with her carbohydrates to help get her blood sugars under much better control she is only at 50% of the metformin at this time so we will increase her to 4 tablets I did tell her the next time she comes we will consider a GLP-1 possibly and I would like to put a 2-week CGM on her for study to see if certain foods affect her blood sugars she was agreeable to that her activity level is where it should be she plans to go to [place] for a week to visit her granddaughter and is very interested in learning everything she can about diabetes she will now make an eye appointment to see her eye doctor at this point she feels like she has made really good progress I do see in the mornings her blood sugars are coming down quite a bit needs more help for her during the day as I think the metformin will make a big difference if not we may have to add the GLP-1 very very nice lady to be working with    Patient's most recent   Lab Results   Component Value Date    A1C 11.0 01/30/2023     is not meeting goal of <7.0    Diabetes knowledge and skills assessment:   Patient is knowledgeable in diabetes management concepts related to: Being Active, Monitoring, Taking Medication, Reducing Risks and Healthy Coping    Continue education with the following diabetes management concepts: Healthy Eating, Taking  Medication and Problem Solving    Based on learning assessment above, most appropriate setting for further diabetes education would be: Individual setting.      PLAN  1. Increase metformin  mg take 1 pill with breakfast 2 pills with dinner for 7 days then increase to 2 pills with breakfast and 2 pills with dinner and that is the dose she will stay on  2. Continue checking blood sugars when you wake up and 2 hours after dinner if the 2 hours after dinner becomes a problem you can CHEK2 hours after lunch or 2 hours after breakfast  3. Continue with your exercise that is really helping your blood sugars coming down  4. The meal planning protein with all meals and snacks trying to get in at least 3 carb choices with each meal and 1-2 carb choices with snacks  5. If the blood sugars are not getting down to goal in a month we will consider Ozempic Trulicity Mounjaro or Rybelsus to see if that will help get the blood sugars into better control  6. Getting in more water or fluids during the day and the magnesium at bedtime may help some of the cramping in the legs    Topics to cover at upcoming visits: Healthy Eating, Taking Medication and Problem Solving    Follow-up:     See Care Plan for co-developed, patient-state behavior change goals.  AVS provided for patient today.    Education Materials Provided:  Carbohydrate Counting and My Plate Planner      SUBJECTIVE/OBJECTIVE:  Presents for: Follow-up  Accompanied by: Self  Diabetes education in the past 24mo: No  Focus of Visit: Diabetes Pathophysiology, Taking Medication, Healthy Eating, Monitoring  Diabetes type: Type 2  Date of diagnosis: 1/2022  How confident are you filling out medical forms by yourself:: Quite a bit  Diabetes management related comments/concerns: Should I have testing equipment? Will I get what is necessary on 2/9/23?  Transportation concerns: No  Difficulty affording diabetes medication?: No  Difficulty affording diabetes testing supplies?:  "No  Other concerns:: None  Cultural Influences/Ethnic Background:  Not  or       Diabetes Symptoms & Complications:  Fatigue: Sometimes  Neuropathy: Sometimes  Polydipsia: No  Polyphagia: No  Polyuria: No  Visual change: No  Slow healing wounds: No  Symptom course: Improving  Weight trend: Stable       Patient Problem List and Family Medical History reviewed for relevant medical history, current medical status, and diabetes risk factors.    Vitals:  There were no vitals taken for this visit.  Estimated body mass index is 22.63 kg/m  as calculated from the following:    Height as of 1/17/23: 1.651 m (5' 5\").    Weight as of 1/17/23: 61.7 kg (136 lb).   Last 3 BP:   BP Readings from Last 3 Encounters:   01/17/23 130/72   11/30/22 (!) 162/86   11/03/22 (!) 142/76       History   Smoking Status     Former     Packs/day: 0.50     Years: 39.00     Types: Cigarettes   Smokeless Tobacco     Never       Labs:  Lab Results   Component Value Date    A1C 11.0 01/30/2023     Lab Results   Component Value Date     01/30/2023     Lab Results   Component Value Date    LDL 89 01/11/2023     Direct Measure HDL   Date Value Ref Range Status   01/11/2023 58 >=50 mg/dL Final   ]  GFR Estimate   Date Value Ref Range Status   01/11/2023 >90 >60 mL/min/1.73m2 Final     Comment:     Effective December 21, 2021 eGFRcr in adults is calculated using the 2021 CKD-EPI creatinine equation which includes age and gender (Allyson burrell al., NEJM, DOI: 10.1056/YPDUoz0517475)     No results found for: GFRESTBLACK  Lab Results   Component Value Date    CR 0.64 01/11/2023     No results found for: MICROALBUMIN    Healthy Eating:  Healthy Eating Assessed Today: Yes  Meal planning/habits: Avoiding sweets, Heart healthy, Low salt, Plate planning method  How many times a week on average do you eat food made away from home (restaurant/take-out)?: 2  Meals include: Breakfast, Lunch, Dinner, Morning Snack, Afternoon Snack, Evening " Snack  Breakfast: cheerios and fruit or yogurt and fruit, or adding granola, today eggs and toast and SF jelly  Lunch: salad, or left overs from dinner or a sandwich and grilled cheese  Dinner: spinach salad with cheese and chx, or mexican foods, carnitas burito, or salmon and new potatoes  Snacks: handful of nuts, or apple slices and cheese  Beverages: Water, Tea, Coffee, Diet soda  Has patient met with a dietitian in the past?: No    Being Active:  Being Active Assessed Today: Yes  Exercise:: Yes  Days per week of moderate to strenuous exercise (like a brisk walk): 7  On average, minutes per day of exercise at this level: 30  How intense was your typical exercise? : Moderate (like brisk walking)  Exercise Minutes per Week: 210  Barrier to exercise: None    Monitoring:  Monitoring Assessed Today: Yes  Did patient bring glucose meter to appointment? : Yes  Blood Glucose Meter: Accu-chek  Times checking blood sugar at home (number): 2  Times checking blood sugar at home (per): Day  Blood glucose trend: Decreasing                Taking Medications:  Diabetes Medication(s)     Biguanides       metFORMIN (GLUCOPHAGE XR) 500 MG 24 hr tablet    Take 2 tablets (1,000 mg) by mouth 2 times daily (with meals)          Taking Medication Assessed Today: Yes  Current Treatments: Oral Medication (taken by mouth)  Problems taking diabetes medications regularly?: No  Diabetes medication side effects?: No    Problem Solving:                 Reducing Risks:  Diabetes Risks: Age over 45 years, Family History  CAD Risks: Diabetes Mellitus, Family history, Hypertension  Has dilated eye exam at least once a year?: No  Sees dentist every 6 months?: Yes  Feet checked by healthcare provider in the last year?: No    Healthy Coping:  Healthy Coping Assessed Today: Yes  Emotional response to diabetes: Concern for health and well-being, Ready to learn, Acceptance  Informal Support system:: Children, Family, Friends, Spouse  Stage of change:  ACTION (Actively working towards change)  Patient Activation Measure Survey Score:  No flowsheet data found.      Care Plan and Education Provided:  Care Plan: Diabetes   Updates made by Ellen Min RN since 3/27/2023 12:00 AM      Problem: HbA1C Not In Goal       Goal: Establish Regular Follow-Ups with PCP    Start Date: 3/27/2023   This Visit's Progress: 80%   Recent Progress: 0%      Problem: Diabetes Self-Management Education Needed to Optimize Self-Care Behaviors       Goal: Understand diabetes pathophysiology and disease progression    Start Date: 3/27/2023   This Visit's Progress: 80%   Recent Progress: 0%      Goal: Healthy Eating - follow a healthy eating pattern for diabetes    Start Date: 3/27/2023   This Visit's Progress: 70%   Recent Progress: 40%      Task: Provide education on heart healthy eating Completed 3/27/2023   Responsible User: Ellen Min RN      Task: Provide education on eating out Completed 3/27/2023   Responsible User: Ellen Min RN      Goal: Being Active - get regular physical activity, working up to at least 150 minutes per week    Start Date: 3/27/2023   This Visit's Progress: 100%      Task: Provide education on relationship of activity to glucose and precautions to take if at risk for low glucose Completed 3/27/2023   Responsible User: Ellen Min RN      Task: Discuss barriers to physical activity with patient Completed 3/27/2023   Responsible User: Ellen Min RN      Task: Develop physical activity plan with patient Completed 3/27/2023   Responsible User: Ellen Min RN      Goal: Monitoring - monitor glucose and ketones as directed    Start Date: 3/27/2023   This Visit's Progress: 90%      Task: Provide education on blood glucose monitoring (purpose, proper technique, frequency, glucose targets, interpreting results, when to use glucose control solution, sharps disposal) Completed 3/27/2023   Responsible User: Ellen Min RN      Goal: Taking  Medication - patient is consistently taking medications as directed    Start Date: 3/27/2023   This Visit's Progress: 50%   Recent Progress: 20%      Task: Provide education on insulin and injectable diabetes medications, including administration, storage, site selection and rotation for injection sites Completed 3/27/2023   Responsible User: Ellen Min RN      Goal: Problem Solving - know how to prevent and manage short-term diabetes complications    Start Date: 3/27/2023   This Visit's Progress: 50%      Task: Provide education on high blood glucose - causes, signs/symptoms, prevention and treatment Completed 3/27/2023   Responsible User: Ellen Min RN      Task: Provide education on low blood glucose - causes, signs/symptoms, prevention, treatment, carrying a carbohydrate source at all times, and medical identification Completed 3/27/2023   Responsible User: Ellen Min RN      Task: Provide education on safe travel with diabetes Completed 3/27/2023   Responsible User: Ellen Min RN      Goal: Healthy Coping - use available resources to cope with the challenges of managing diabetes    Start Date: 3/27/2023   This Visit's Progress: 100%   Recent Progress: 50%          Rahel Min RN/DARNELL Martini Diabetes Educator      Time Spent: 60 minutes  Encounter Type: Individual    Any diabetes medication dose changes were made via the CDE Protocol per the patient's primary care provider. A copy of this encounter was shared with the provider.

## 2023-03-27 NOTE — PATIENT INSTRUCTIONS
Diabetes Support Resources:  Increase metformin  mg take 1 pill with breakfast 2 pills with dinner for 7 days then increase to 2 pills with breakfast and 2 pills with dinner and that is the dose she will stay on  Continue checking blood sugars when you wake up and 2 hours after dinner if the 2 hours after dinner becomes a problem you can CHEK2 hours after lunch or 2 hours after breakfast  Continue with your exercise that is really helping your blood sugars coming down  The meal planning protein with all meals and snacks trying to get in at least 3 carb choices with each meal and 1-2 carb choices with snacks  If the blood sugars are not getting down to goal in a month we will consider Ozempic Trulicity Mounjaro or Rybelsus to see if that will help get the blood sugars into better control  Getting in more water or fluids during the day and the magnesium at bedtime may help some of the cramping in the legs       Bring blood glucose meter and logbook with you to all doctor and follow-up appointments.    Diabetes Education Telephone Visit Follow-up:    We realize your time is valuable and your health is important! We offer a convenient Telephone Visit follow up! It s a quick way to check in for a medication dose adjustment without having to come back to clinic as soon.    Telephone Visits are often covered by insurance. Please check with your insurance plan to see if this type of visit is covered. If not, the cost is less expensive than an office visit:    Up to 10 minutes (Code 66046): $30  11-20 minutes (Code 83963): $59  More than 20 minutes (Code 12443): $85    Talk with your Diabetes Educator if you want to learn more.      [facility]:  For Your Diabetes Education and Nutrition Appointments Call:  [phone]   For Diabetes Education or Nutrition Related Questions:   Phone: [phone]  Send [facility] Message   If you need a medication refill please contact your  pharmacy. Please allow 3 business days for your refills to be completed.     21-Mar-2021 01:47

## 2023-03-27 NOTE — PROGRESS NOTES
Diabetes Self-Management Education & Support    Presents for: Follow-up    Type of Service: In Person Visit    Assessment Type:   ASSESSMENT:  Liat is here today to learn more about how to manage her diabetes we spent a lot of time on meal planning she said she was struggling with that and was actually avoiding some meals and some foods she will work on these and mixing proteins with her carbohydrates to help get her blood sugars under much better control she is only at 50% of the metformin at this time so we will increase her to 4 tablets I did tell her the next time she comes we will consider a GLP-1 possibly and I would like to put a 2-week CGM on her for study to see if certain foods affect her blood sugars she was agreeable to that her activity level is where it should be she plans to go to [place] for a week to visit her granddaughter and is very interested in learning everything she can about diabetes she will now make an eye appointment to see her eye doctor at this point she feels like she has made really good progress I do see in the mornings her blood sugars are coming down quite a bit needs more help for her during the day as I think the metformin will make a big difference if not we may have to add the GLP-1 very very nice lady to be working with    Patient's most recent   Lab Results   Component Value Date    A1C 11.0 01/30/2023     is not meeting goal of <7.0    Diabetes knowledge and skills assessment:   Patient is knowledgeable in diabetes management concepts related to: Being Active, Monitoring, Taking Medication, Reducing Risks and Healthy Coping    Continue education with the following diabetes management concepts: Healthy Eating, Taking Medication and Problem Solving    Based on learning assessment above, most appropriate setting for further diabetes education would be: Individual setting.      PLAN  1. Increase metformin  mg take 1 pill with breakfast 2 pills with dinner for 7 days  then increase to 2 pills with breakfast and 2 pills with dinner and that is the dose she will stay on  2. Continue checking blood sugars when you wake up and 2 hours after dinner if the 2 hours after dinner becomes a problem you can CHEK2 hours after lunch or 2 hours after breakfast  3. Continue with your exercise that is really helping your blood sugars coming down  4. The meal planning protein with all meals and snacks trying to get in at least 3 carb choices with each meal and 1-2 carb choices with snacks  5. If the blood sugars are not getting down to goal in a month we will consider Ozempic Trulicity Mounjaro or Rybelsus to see if that will help get the blood sugars into better control  6. Getting in more water or fluids during the day and the magnesium at bedtime may help some of the cramping in the legs    Topics to cover at upcoming visits: Healthy Eating, Taking Medication and Problem Solving    Follow-up:     See Care Plan for co-developed, patient-state behavior change goals.  AVS provided for patient today.    Education Materials Provided:  Carbohydrate Counting and My Plate Planner      SUBJECTIVE/OBJECTIVE:  Presents for: Follow-up  Accompanied by: Self  Diabetes education in the past 24mo: No  Focus of Visit: Diabetes Pathophysiology, Taking Medication, Healthy Eating, Monitoring  Diabetes type: Type 2  Date of diagnosis: 1/2022  How confident are you filling out medical forms by yourself:: Quite a bit  Diabetes management related comments/concerns: Should I have testing equipment? Will I get what is necessary on 2/9/23?  Transportation concerns: No  Difficulty affording diabetes medication?: No  Difficulty affording diabetes testing supplies?: No  Other concerns:: None  Cultural Influences/Ethnic Background:  Not  or       Diabetes Symptoms & Complications:  Fatigue: Sometimes  Neuropathy: Sometimes  Polydipsia: No  Polyphagia: No  Polyuria: No  Visual change: No  Slow healing wounds:  "No  Symptom course: Improving  Weight trend: Stable       Patient Problem List and Family Medical History reviewed for relevant medical history, current medical status, and diabetes risk factors.    Vitals:  There were no vitals taken for this visit.  Estimated body mass index is 22.63 kg/m  as calculated from the following:    Height as of 1/17/23: 1.651 m (5' 5\").    Weight as of 1/17/23: 61.7 kg (136 lb).   Last 3 BP:   BP Readings from Last 3 Encounters:   01/17/23 130/72   11/30/22 (!) 162/86   11/03/22 (!) 142/76       History   Smoking Status     Former     Packs/day: 0.50     Years: 39.00     Types: Cigarettes   Smokeless Tobacco     Never       Labs:  Lab Results   Component Value Date    A1C 11.0 01/30/2023     Lab Results   Component Value Date     01/30/2023     Lab Results   Component Value Date    LDL 89 01/11/2023     Direct Measure HDL   Date Value Ref Range Status   01/11/2023 58 >=50 mg/dL Final   ]  GFR Estimate   Date Value Ref Range Status   01/11/2023 >90 >60 mL/min/1.73m2 Final     Comment:     Effective December 21, 2021 eGFRcr in adults is calculated using the 2021 CKD-EPI creatinine equation which includes age and gender (Allyson et al., NEJ, DOI: 10.1056/SJPIpq8330291)     No results found for: GFRESTBLACK  Lab Results   Component Value Date    CR 0.64 01/11/2023     No results found for: MICROALBUMIN    Healthy Eating:  Healthy Eating Assessed Today: Yes  Meal planning/habits: Avoiding sweets, Heart healthy, Low salt, Plate planning method  How many times a week on average do you eat food made away from home (restaurant/take-out)?: 2  Meals include: Breakfast, Lunch, Dinner, Morning Snack, Afternoon Snack, Evening Snack  Breakfast: cheerios and fruit or yogurt and fruit, or adding granola, today eggs and toast and SF jelly  Lunch: salad, or left overs from dinner or a sandwich and grilled cheese  Dinner: spinach salad with cheese and chx, or mexican foods, carnitas burito, or salmon " and new potatoes  Snacks: handful of nuts, or apple slices and cheese  Beverages: Water, Tea, Coffee, Diet soda  Has patient met with a dietitian in the past?: No    Being Active:  Being Active Assessed Today: Yes  Exercise:: Yes  Days per week of moderate to strenuous exercise (like a brisk walk): 7  On average, minutes per day of exercise at this level: 30  How intense was your typical exercise? : Moderate (like brisk walking)  Exercise Minutes per Week: 210  Barrier to exercise: None    Monitoring:  Monitoring Assessed Today: Yes  Did patient bring glucose meter to appointment? : Yes  Blood Glucose Meter: Accu-chek  Times checking blood sugar at home (number): 2  Times checking blood sugar at home (per): Day  Blood glucose trend: Decreasing                Taking Medications:  Diabetes Medication(s)     Biguanides       metFORMIN (GLUCOPHAGE XR) 500 MG 24 hr tablet    Take 2 tablets (1,000 mg) by mouth 2 times daily (with meals)          Taking Medication Assessed Today: Yes  Current Treatments: Oral Medication (taken by mouth)  Problems taking diabetes medications regularly?: No  Diabetes medication side effects?: No    Problem Solving:                 Reducing Risks:  Diabetes Risks: Age over 45 years, Family History  CAD Risks: Diabetes Mellitus, Family history, Hypertension  Has dilated eye exam at least once a year?: No  Sees dentist every 6 months?: Yes  Feet checked by healthcare provider in the last year?: No    Healthy Coping:  Healthy Coping Assessed Today: Yes  Emotional response to diabetes: Concern for health and well-being, Ready to learn, Acceptance  Informal Support system:: Children, Family, Friends, Spouse  Stage of change: ACTION (Actively working towards change)  Patient Activation Measure Survey Score:  No flowsheet data found.      Care Plan and Education Provided:  Care Plan: Diabetes   Updates made by Ellen Min, RN since 3/27/2023 12:00 AM      Problem: HbA1C Not In Goal       Goal:  Establish Regular Follow-Ups with PCP    Start Date: 3/27/2023   This Visit's Progress: 80%   Recent Progress: 0%      Problem: Diabetes Self-Management Education Needed to Optimize Self-Care Behaviors       Goal: Understand diabetes pathophysiology and disease progression    Start Date: 3/27/2023   This Visit's Progress: 80%   Recent Progress: 0%      Goal: Healthy Eating - follow a healthy eating pattern for diabetes    Start Date: 3/27/2023   This Visit's Progress: 70%   Recent Progress: 40%      Task: Provide education on heart healthy eating Completed 3/27/2023   Responsible User: Ellen Min RN      Task: Provide education on eating out Completed 3/27/2023   Responsible User: Ellen Min RN      Goal: Being Active - get regular physical activity, working up to at least 150 minutes per week    Start Date: 3/27/2023   This Visit's Progress: 100%      Task: Provide education on relationship of activity to glucose and precautions to take if at risk for low glucose Completed 3/27/2023   Responsible User: Ellen Min RN      Task: Discuss barriers to physical activity with patient Completed 3/27/2023   Responsible User: Ellen Min RN      Task: Develop physical activity plan with patient Completed 3/27/2023   Responsible User: Ellen Min RN      Goal: Monitoring - monitor glucose and ketones as directed    Start Date: 3/27/2023   This Visit's Progress: 90%      Task: Provide education on blood glucose monitoring (purpose, proper technique, frequency, glucose targets, interpreting results, when to use glucose control solution, sharps disposal) Completed 3/27/2023   Responsible User: Ellen Min RN      Goal: Taking Medication - patient is consistently taking medications as directed    Start Date: 3/27/2023   This Visit's Progress: 50%   Recent Progress: 20%      Task: Provide education on insulin and injectable diabetes medications, including administration, storage, site selection and  rotation for injection sites Completed 3/27/2023   Responsible User: Ellen Min RN      Goal: Problem Solving - know how to prevent and manage short-term diabetes complications    Start Date: 3/27/2023   This Visit's Progress: 50%      Task: Provide education on high blood glucose - causes, signs/symptoms, prevention and treatment Completed 3/27/2023   Responsible User: Ellen Min RN      Task: Provide education on low blood glucose - causes, signs/symptoms, prevention, treatment, carrying a carbohydrate source at all times, and medical identification Completed 3/27/2023   Responsible User: Ellen Min, DORETHA      Task: Provide education on safe travel with diabetes Completed 3/27/2023   Responsible User: Ellen Min RN      Goal: Healthy Coping - use available resources to cope with the challenges of managing diabetes    Start Date: 3/27/2023   This Visit's Progress: 100%   Recent Progress: 50%          Rahel Min RN/DARNELL Martini Diabetes Educator      Time Spent: 60 minutes  Encounter Type: Individual    Any diabetes medication dose changes were made via the CDE Protocol per the patient's primary care provider. A copy of this encounter was shared with the provider.

## 2023-04-03 ENCOUNTER — TELEPHONE (OUTPATIENT)
Dept: FAMILY MEDICINE | Facility: CLINIC | Age: 68
End: 2023-04-03

## 2023-04-03 NOTE — TELEPHONE ENCOUNTER
Patient scheduled with Laura Ordonez on 4/6/23 at 1:30 pm.  Sheri CALDERÓN    [facility]

## 2023-04-03 NOTE — TELEPHONE ENCOUNTER
Reason for Call:  Appointment Request    Patient requesting this type of appt:  Patient fell in airport and injured both knees. She wants to know if KristenKehr can fit her in possibly Thursday 4/6 afternoon or Friday, 4/7. She is on her way home on a plane. Or, if not Dr. Kehr, any provider at [facility] that can fit her in those days.     Requested provider: Kristen Kehr    Reason patient unable to be scheduled: Needs to be fit into Schedule   When does patient want to be seen/preferred time: Later afternoon on Thursday, 4/6 or Friday, 4/7 are 1st choice    Comments: N/A    Could we send this information to you in [email or web address] or would you prefer to receive a phone call?:   Please call patient at [phone]  Call taken on 4/3/2023 at 5:00 PM by Mery Quiroga

## 2023-04-04 NOTE — PROGRESS NOTES
Assessment & Plan     Closed nondisplaced transverse fracture of patella with routine healing, unspecified laterality, subsequent encounter  Per er note was to follow up with ortho, will refer scheduled to meet with ortho today  They would like handicap sticker temporary so that was filled out today as well      - Orthopedic  Referral; Future        JT Calderon [facility] [place]    Sivakumar Weller is a 67 year old, presenting for the following health issues:  ER F/U         View : No data to display.              HPI     ED/UC Followup:    Facility:  [facility]  Date of visit: 04/01/2023  Reason for visit: Fall per pt.  Current Status: Per pt feels like Sx is stable. Standing causes the pain.  Has nondisplaced bilateral patellar fractures and a wrist sprain.   Orthopedic surgeon follow up? Does not have scheduled but would like to. Hers retired likely.     Pain medication made her nauseous. Is just taking the ibuprofen.  No pain at rest. Moving it at all causes pain.     is with today. Patient using wheelchair and bilateral knee braces.    Wrist is feeling better, not needing brace anymore.  No pain anymore.         Copied from er notes:        Formatting of this note is different from the original.  [facility] Kaykay Rockwell  Emergency Department Encounter Note MRN:[id number]  [address] [place] [address] PCP:No Physician on file  [phone]    History:  Kaykay Rockwell is a 67 y.o. female who presents to the ED with  Knee Injury    67-year-old female with a past medical history of diabetes and hypertension who was in the airport after arriving to come visit her daughter when she got tangled up in a suitcase and fell onto her bilateral knees and her outstretched right hand. She states that she initially did not have any hand or wrist pain however it started to develop earlier today. She denies any focal  weakness, numbness and tingling. She is complaining of bilateral knee pain that is worsened with ambulation. She is able to ambulate. She denies any focal weakness, numbness and tingling. She denies any head trauma loss of consciousness. She is not on any blood thinners.      Clinical Impression:   1. Closed nondisplaced transverse fracture of right patella, initial encounter   2. Closed nondisplaced fracture of left patella, unspecified fracture morphology, initial encounter   3. Wrist sprain, right, initial encounter     New Prescriptions   HYDROCODONE-ACETAMINOPHEN (NORCO) 5-325 MG PER TABLET Take 1-2 tablets by mouth every 6 hours as needed for Pain.   IBUPROFEN (ADVIL,MOTRIN) 600 MG TABLET Take 1 tablet by mouth every 6 hours as needed for Pain or Fever.     Follow-up Information     Please follow up.   Contact information:  Stated that you have a orthopedic surgeon please try to follow-up with them this week.                        Lexus Ortez MD  04/01/23 1030    Electronically signed by Lexus Ortez MD at 04/01/2023 10:30 AM [facility] registry-no fills. Patient was in [place] when this happened.         Review of Systems   Constitutional, HEENT, cardiovascular, pulmonary, GI, , musculoskeletal, neuro, skin, endocrine and psych systems are negative, except as otherwise noted.      Objective    /73   Pulse 90   Temp 97  F (36.1  C) (Tympanic)   Resp 14   Wt 61.7 kg (136 lb)   SpO2 97%   Breastfeeding No   BMI 22.63 kg/m    Body mass index is 22.63 kg/m .  Physical Exam   GENERAL: healthy, alert and no distress, in wheelchair not ambulatory   RESP: lungs clear to auscultation - no rales, rhonchi or wheezes  CV: regular rate and rhythm, normal S1 S2, no S3 or S4, no murmur, click or rub, no peripheral edema and peripheral pulses strong  MS: no gross musculoskeletal defects noted, no edema  NEURO: Normal strength and tone, mentation intact and speech normal  PSYCH: mentation  appears normal, affect normal/bright

## 2023-04-06 ENCOUNTER — OFFICE VISIT (OUTPATIENT)
Dept: ORTHOPEDICS | Facility: CLINIC | Age: 68
End: 2023-04-06
Payer: COMMERCIAL

## 2023-04-06 ENCOUNTER — OFFICE VISIT (OUTPATIENT)
Dept: FAMILY MEDICINE | Facility: CLINIC | Age: 68
End: 2023-04-06
Payer: COMMERCIAL

## 2023-04-06 ENCOUNTER — ANCILLARY PROCEDURE (OUTPATIENT)
Dept: GENERAL RADIOLOGY | Facility: CLINIC | Age: 68
End: 2023-04-06
Attending: ORTHOPAEDIC SURGERY
Payer: COMMERCIAL

## 2023-04-06 VITALS
SYSTOLIC BLOOD PRESSURE: 115 MMHG | OXYGEN SATURATION: 97 % | DIASTOLIC BLOOD PRESSURE: 73 MMHG | BODY MASS INDEX: 22.63 KG/M2 | HEART RATE: 90 BPM | RESPIRATION RATE: 14 BRPM | TEMPERATURE: 97 F | WEIGHT: 136 LBS

## 2023-04-06 VITALS — SYSTOLIC BLOOD PRESSURE: 127 MMHG | DIASTOLIC BLOOD PRESSURE: 63 MMHG | OXYGEN SATURATION: 99 % | HEART RATE: 90 BPM

## 2023-04-06 DIAGNOSIS — S82.009A CLOSED NONDISPLACED FRACTURE OF PATELLA, UNSPECIFIED FRACTURE MORPHOLOGY, UNSPECIFIED LATERALITY, INITIAL ENCOUNTER: ICD-10-CM

## 2023-04-06 DIAGNOSIS — S82.042A CLOSED DISPLACED COMMINUTED FRACTURE OF LEFT PATELLA, INITIAL ENCOUNTER: ICD-10-CM

## 2023-04-06 DIAGNOSIS — S82.009A CLOSED NONDISPLACED FRACTURE OF PATELLA, UNSPECIFIED FRACTURE MORPHOLOGY, UNSPECIFIED LATERALITY, INITIAL ENCOUNTER: Primary | ICD-10-CM

## 2023-04-06 DIAGNOSIS — S82.002A CLOSED NONDISPLACED FRACTURE OF LEFT PATELLA, UNSPECIFIED FRACTURE MORPHOLOGY, INITIAL ENCOUNTER: ICD-10-CM

## 2023-04-06 DIAGNOSIS — S82.036D: Primary | ICD-10-CM

## 2023-04-06 PROBLEM — E11.9 DIABETES MELLITUS, TYPE 2 (H): Status: ACTIVE | Noted: 2023-04-06

## 2023-04-06 PROCEDURE — 73562 X-RAY EXAM OF KNEE 3: CPT | Mod: TC | Performed by: RADIOLOGY

## 2023-04-06 PROCEDURE — 99213 OFFICE O/P EST LOW 20 MIN: CPT | Performed by: PHYSICIAN ASSISTANT

## 2023-04-06 PROCEDURE — 99204 OFFICE O/P NEW MOD 45 MIN: CPT | Performed by: ORTHOPAEDIC SURGERY

## 2023-04-06 RX ORDER — IBUPROFEN 600 MG/1
600 TABLET, FILM COATED ORAL EVERY 6 HOURS PRN
COMMUNITY
Start: 2023-04-06

## 2023-04-06 RX ORDER — HYDROCODONE BITARTRATE AND ACETAMINOPHEN 5; 325 MG/1; MG/1
1 TABLET ORAL EVERY 6 HOURS PRN
COMMUNITY
Start: 2023-04-01 | End: 2023-04-14

## 2023-04-06 ASSESSMENT — PAIN SCALES - GENERAL
PAINLEVEL: MODERATE PAIN (4)
PAINLEVEL: MODERATE PAIN (4)

## 2023-04-06 NOTE — LETTER
4/6/2023         RE: Kaykay Rockwell  [address]  [place] [address]        Dear Colleague,    Thank you for referring your patient, Kaykay Rockwell, to the [facility]. Please see a copy of my visit note below.    SUBJECTIVE:   Kaykay Rockwell is a 67 year old female who is seen as self referral for evaluation of an acute right knee injury  DOI: 4/1/23    HPI: Patient was in the airport after arriving to come visit her daughter when she got tangled up in a suitcase and fell onto her bilateral knees and her outstretched right hand.    Present symptoms: not a great deal of pain.  She had a lot of swelling of the right knee, not so much on the left      Treatments tried to this point: ice, bilateral knee immobilizers    Previous knee issues: none    Past Medical History:   Past Medical History:   Diagnosis Date     NO ACTIVE PROBLEMS      Past Surgical History:   Past Surgical History:   Procedure Laterality Date     BIOPSY  November, 2022     ORTHOPEDIC SURGERY  2010     SURGICAL HISTORY OF -       left shoulder torn rotator     TONSILLECTOMY       Family History:   Family History   Problem Relation Age of Onset     Diabetes Mother      Heart Disease Mother      Hypertension Mother      Cancer Mother      Glaucoma Mother 80        drops     Macular Degeneration Mother         loss of vison     Depression Mother      Diabetes Father      Hypertension Father      Cancer Father      Cerebrovascular Disease No family hx of      Thyroid Disease No family hx of      Social History:   Social History     Tobacco Use     Smoking status: Former     Packs/day: 0.50     Years: 39.00     Pack years: 19.50     Types: Cigarettes     Smokeless tobacco: Never   Vaping Use     Vaping status: Never Used   Substance Use Topics     Alcohol use: Yes     Comment: occ.       Review of Systems:  Constitutional:  NEGATIVE for fever, chills, change in weight  Integumentary/Skin:  NEGATIVE for  worrisome rashes, moles or lesions  Eyes:  NEGATIVE for vision changes or irritation  ENT/Mouth:  NEGATIVE for ear, mouth and throat problems  Resp:  NEGATIVE for significant cough or SOB  Breast:  NEGATIVE for masses, tenderness or discharge  CV:  NEGATIVE for chest pain, palpitations or peripheral edema  GI:  NEGATIVE for nausea, abdominal pain, heartburn, or change in bowel habits  :  Negative   Musculoskeletal:  See HPI above  Neuro:  NEGATIVE for weakness, dizziness or paresthesias  Endocrine:  NEGATIVE for temperature intolerance, skin/hair changes  Heme/allergy/immune:  NEGATIVE for bleeding problems  Psychiatric:  NEGATIVE for changes in mood or affect      OBJECTIVE:  Physical Exam:  /63 (BP Location: Right arm, Patient Position: Sitting, Cuff Size: Adult Regular)   Pulse 90   SpO2 99%   General Appearance: healthy, alert and no distress   Skin: no suspicious lesions or rashes  Neuro: Normal strength and tone, mentation intact and speech normal  Vascular: good pulses, and cappillary refill   Lymph: no lymphadenopathy   Psych:  mentation appears normal and affect normal/bright  Resp: no increased work of breathing     Bilateral Knee Exam:  Gait: not tested   Alignment: normal     Patellofemoral joint: no extensor lag of either knee.  Effusion: mild left, moderate right, with more ecchymosis on right   ROM: not tested but she tolerates sitting with knees flexed 30 degrees  Tender: bilateral patellas    Ligaments:   Lachman's: stable   Varus/Valgus stress: stable to varus and valgus stress    X-rays:  Reviewed in the office with the patient today:    Electronically signed by: Anuel Lara M.D. on 4/1/2023 9:36 AM   XR Knee Left 3 Vw    3 views of the left knee demonstrate a  fracture of the patella with deformity of the articular surface of the patella. There is a small suprapatellar joint effusion present. No other fractures are identified.  AP suggests a possible comminuted fracture, stellate.      XR Knee Right 3 Vw    There is a transverse fracture through the mid patella best seen on the lateral view. The articular surface of the patella is deformed. There is a moderate suprapatellar joint effusion present. No other fractures are identified.  AP suggests a possible comminuted fracture, stellate.      ASSESSMENT:   Bilateral patellar fractures, that appear to be comminuted. Both patellae have fairly sizable stepoffs, but her extensor mechanisms are both intact.    PLAN:   She is theoretically predisposed to arthritis based on the chondral trauma, regardless of the displacement. The stepoff of the fractures may also result in osteoarthritis.  She does have intact extensor mechanisms now, so surgical treatment is not a certainty.  I ordered CT's of both knees. Would appreciate 1mm cuts through the patellas.    TESFAYE Hernández MD  Dept. Orthopedic Surgery  [facility]       Again, thank you for allowing me to participate in the care of your patient.        Sincerely,        Noman Hernández MD

## 2023-04-06 NOTE — PROGRESS NOTES
SUBJECTIVE:   Kaykay Rockwell is a 67 year old female who is seen as self referral for evaluation of an acute right knee injury  DOI: 4/1/23    HPI: [place] in the airport after arriving to come visit her daughter when she got tangled up in a suitcase and fell onto her bilateral knees and her outstretched right hand.    Present symptoms: not a great deal of pain.  She had a lot of swelling of the right knee, not so much on the left      Treatments tried to this point: ice, bilateral knee immobilizers    Previous knee issues: none    Past Medical History:   Past Medical History:   Diagnosis Date     NO ACTIVE PROBLEMS      Past Surgical History:   Past Surgical History:   Procedure Laterality Date     BIOPSY  November, 2022     ORTHOPEDIC SURGERY  2010     SURGICAL HISTORY OF -       left shoulder torn rotator     TONSILLECTOMY       Family History:   Family History   Problem Relation Age of Onset     Diabetes Mother      Heart Disease Mother      Hypertension Mother      Cancer Mother      Glaucoma Mother 80        drops     Macular Degeneration Mother         loss of vison     Depression Mother      Diabetes Father      Hypertension Father      Cancer Father      Cerebrovascular Disease No family hx of      Thyroid Disease No family hx of      Social History:   Social History     Tobacco Use     Smoking status: Former     Packs/day: 0.50     Years: 39.00     Pack years: 19.50     Types: Cigarettes     Smokeless tobacco: Never   Vaping Use     Vaping status: Never Used   Substance Use Topics     Alcohol use: Yes     Comment: occ.       Review of Systems:  Constitutional:  NEGATIVE for fever, chills, change in weight  Integumentary/Skin:  NEGATIVE for worrisome rashes, moles or lesions  Eyes:  NEGATIVE for vision changes or irritation  ENT/Mouth:  NEGATIVE for ear, mouth and throat problems  Resp:  NEGATIVE for significant cough or SOB  Breast:  NEGATIVE for masses, tenderness or discharge  CV:  NEGATIVE for  chest pain, palpitations or peripheral edema  GI:  NEGATIVE for nausea, abdominal pain, heartburn, or change in bowel habits  :  Negative   Musculoskeletal:  See HPI above  Neuro:  NEGATIVE for weakness, dizziness or paresthesias  Endocrine:  NEGATIVE for temperature intolerance, skin/hair changes  Heme/allergy/immune:  NEGATIVE for bleeding problems  Psychiatric:  NEGATIVE for changes in mood or affect      OBJECTIVE:  Physical Exam:  /63 (BP Location: Right arm, Patient Position: Sitting, Cuff Size: Adult Regular)   Pulse 90   SpO2 99%   General Appearance: healthy, alert and no distress   Skin: no suspicious lesions or rashes  Neuro: Normal strength and tone, mentation intact and speech normal  Vascular: good pulses, and cappillary refill   Lymph: no lymphadenopathy   Psych:  mentation appears normal and affect normal/bright  Resp: no increased work of breathing     Bilateral Knee Exam:  Gait: not tested   Alignment: normal     Patellofemoral joint: no extensor lag of either knee.  Effusion: mild left, moderate right, with more ecchymosis on right   ROM: not tested but she tolerates sitting with knees flexed 30 degrees  Tender: bilateral patellas    Ligaments:   Lachman's: stable   Varus/Valgus stress: stable to varus and valgus stress    X-rays:  Reviewed in the office with the patient today:    Electronically signed by: Anuel Lara M.D. on 4/1/2023 9:36 AM   XR Knee Left 3 Vw    3 views of the left knee demonstrate a  fracture of the patella with deformity of the articular surface of the patella. There is a small suprapatellar joint effusion present. No other fractures are identified.  AP suggests a possible comminuted fracture, stellate.     XR Knee Right 3 Vw    There is a transverse fracture through the mid patella best seen on the lateral view. The articular surface of the patella is deformed. There is a moderate suprapatellar joint effusion present. No other fractures are identified.  AP  suggests a possible comminuted fracture, stellate.      ASSESSMENT:   Bilateral patellar fractures, that appear to be comminuted. Both patellae have fairly sizable stepoffs, but her extensor mechanisms are both intact.    PLAN:   She is theoretically predisposed to arthritis based on the chondral trauma, regardless of the displacement. The stepoff of the fractures may also result in osteoarthritis.  She does have intact extensor mechanisms now, so surgical treatment is not a certainty.  I ordered CT's of both knees. Would appreciate 1mm cuts through the patellas.    TESFAYE Hernández MD  Dept. Orthopedic Surgery  [facility]

## 2023-04-10 ENCOUNTER — ANCILLARY PROCEDURE (OUTPATIENT)
Dept: CT IMAGING | Facility: CLINIC | Age: 68
End: 2023-04-10
Attending: ORTHOPAEDIC SURGERY
Payer: COMMERCIAL

## 2023-04-10 DIAGNOSIS — S82.009A CLOSED NONDISPLACED FRACTURE OF PATELLA, UNSPECIFIED FRACTURE MORPHOLOGY, UNSPECIFIED LATERALITY, INITIAL ENCOUNTER: ICD-10-CM

## 2023-04-10 DIAGNOSIS — S82.002A CLOSED NONDISPLACED FRACTURE OF LEFT PATELLA, UNSPECIFIED FRACTURE MORPHOLOGY, INITIAL ENCOUNTER: ICD-10-CM

## 2023-04-10 DIAGNOSIS — S82.042A CLOSED DISPLACED COMMINUTED FRACTURE OF LEFT PATELLA, INITIAL ENCOUNTER: ICD-10-CM

## 2023-04-10 PROCEDURE — 73700 CT LOWER EXTREMITY W/O DYE: CPT | Mod: RT | Performed by: RADIOLOGY

## 2023-04-10 PROCEDURE — 76377 3D RENDER W/INTRP POSTPROCES: CPT | Performed by: RADIOLOGY

## 2023-04-12 ENCOUNTER — PREP FOR PROCEDURE (OUTPATIENT)
Dept: ORTHOPEDICS | Facility: CLINIC | Age: 68
End: 2023-04-12

## 2023-04-12 ENCOUNTER — TELEPHONE (OUTPATIENT)
Dept: ORTHOPEDICS | Facility: CLINIC | Age: 68
End: 2023-04-12

## 2023-04-12 ENCOUNTER — OFFICE VISIT (OUTPATIENT)
Dept: FAMILY MEDICINE | Facility: CLINIC | Age: 68
End: 2023-04-12
Payer: COMMERCIAL

## 2023-04-12 VITALS
DIASTOLIC BLOOD PRESSURE: 79 MMHG | SYSTOLIC BLOOD PRESSURE: 126 MMHG | HEIGHT: 65 IN | OXYGEN SATURATION: 97 % | BODY MASS INDEX: 22.63 KG/M2 | HEART RATE: 95 BPM

## 2023-04-12 DIAGNOSIS — E11.9 TYPE 2 DIABETES MELLITUS WITHOUT COMPLICATION, WITHOUT LONG-TERM CURRENT USE OF INSULIN (H): ICD-10-CM

## 2023-04-12 DIAGNOSIS — S82.099D: ICD-10-CM

## 2023-04-12 DIAGNOSIS — I10 ESSENTIAL HYPERTENSION: ICD-10-CM

## 2023-04-12 DIAGNOSIS — S82.099D: Primary | ICD-10-CM

## 2023-04-12 DIAGNOSIS — Z01.818 PRE-OP EXAM: Primary | ICD-10-CM

## 2023-04-12 LAB
HBA1C MFR BLD: 9 % (ref 0–5.6)
HGB BLD-MCNC: 13.2 G/DL (ref 11.7–15.7)

## 2023-04-12 PROCEDURE — 99214 OFFICE O/P EST MOD 30 MIN: CPT | Performed by: NURSE PRACTITIONER

## 2023-04-12 PROCEDURE — 83036 HEMOGLOBIN GLYCOSYLATED A1C: CPT | Performed by: NURSE PRACTITIONER

## 2023-04-12 PROCEDURE — 93000 ELECTROCARDIOGRAM COMPLETE: CPT | Performed by: NURSE PRACTITIONER

## 2023-04-12 PROCEDURE — 36415 COLL VENOUS BLD VENIPUNCTURE: CPT | Performed by: NURSE PRACTITIONER

## 2023-04-12 PROCEDURE — 80048 BASIC METABOLIC PNL TOTAL CA: CPT | Performed by: NURSE PRACTITIONER

## 2023-04-12 PROCEDURE — 85018 HEMOGLOBIN: CPT | Performed by: NURSE PRACTITIONER

## 2023-04-12 ASSESSMENT — PAIN SCALES - GENERAL: PAINLEVEL: NO PAIN (0)

## 2023-04-12 NOTE — PATIENT INSTRUCTIONS
Hold metformin the morning of surgery.       For informational purposes only. Not to replace the advice of your health care provider. Copyright   ,  [email or web address] [facility]. All rights reserved. Clinically reviewed by Melisa Mueller MD. [employer] [id number] - REV .  Preparing for Your Surgery  Getting started  A nurse will call you to review your health history and instructions. They will give you an arrival time based on your scheduled surgery time. Please be ready to share:  Your doctor's clinic name and phone number  Your medical, surgical, and anesthesia history  A list of allergies and sensitivities  A list of medicines, including herbal treatments and over-the-counter drugs  Whether the patient has a legal guardian (ask how to send us the papers in advance)  Please tell us if you're pregnant--or if there's any chance you might be pregnant. Some surgeries may injure a fetus (unborn baby), so they require a pregnancy test. Surgeries that are safe for a fetus don't always need a test, and you can choose whether to have one.   If you have a child who's having surgery, please ask for a copy of Preparing for Your Child's Surgery.    Preparing for surgery  Within 10 to 30 days of surgery: Have a pre-op exam (sometimes called an H&P, or History and Physical). This can be done at a clinic or pre-operative center.  If you're having a , you may not need this exam. Talk to your care team.  At your pre-op exam, talk to your care team about all medicines you take. If you need to stop any medicines before surgery, ask when to start taking them again.  We do this for your safety. Many medicines can make you bleed too much during surgery. Some change how well surgery (anesthesia) drugs work.  Call your insurance company to let them know you're having surgery. (If you don't have insurance, call [phone].)  Call your clinic if there's any change in your health. This includes signs of a cold or flu (sore  throat, runny nose, cough, rash, fever). It also includes a scrape or scratch near the surgery site.  If you have questions on the day of surgery, call your hospital or surgery center.  Eating and drinking guidelines  For your safety: Unless your surgeon tells you otherwise, follow the guidelines below.  Eat and drink as usual until 8 hours before you arrive for surgery. After that, no food or milk.  Drink clear liquids until 2 hours before you arrive. These are liquids you can see through, like water, Gatorade, and Propel Water. They also include plain black coffee and tea (no cream or milk), candy, and breath mints. You can spit out gum when you arrive.  If you drink alcohol: Stop drinking it the night before surgery.  If your care team tells you to take medicine on the morning of surgery, it's okay to take it with a sip of water.  Preventing infection  Shower or bathe the night before and morning of your surgery. Follow the instructions your clinic gave you. (If no instructions, use regular soap.)  Don't shave or clip hair near your surgery site. We'll remove the hair if needed.  Don't smoke or vape the morning of surgery. You may chew nicotine gum up to 2 hours before surgery. A nicotine patch is okay.  Note: Some surgeries require you to completely quit smoking and nicotine. Check with your surgeon.  Your care team will make every effort to keep you safe from infection. We will:  Clean our hands often with soap and water (or an alcohol-based hand rub).  Clean the skin at your surgery site with a special soap that kills germs.  Give you a special gown to keep you warm. (Cold raises the risk of infection.)  Wear special hair covers, masks, gowns and gloves during surgery.  Give antibiotic medicine, if prescribed. Not all surgeries need antibiotics.  What to bring on the day of surgery  Photo ID and insurance card  Copy of your health care directive, if you have one  Glasses and hearing aids (bring cases)  You  can't wear contacts during surgery  Inhaler and eye drops, if you use them (tell us about these when you arrive)  CPAP machine or breathing device, if you use them  A few personal items, if spending the night  If you have . . .  A pacemaker, ICD (cardiac defibrillator) or other implant: Bring the ID card.  An implanted stimulator: Bring the remote control.  A legal guardian: Bring a copy of the certified (court-stamped) guardianship papers.  Please remove any jewelry, including body piercings. Leave jewelry and other valuables at home.  If you're going home the day of surgery  You must have a responsible adult drive you home. They should stay with you overnight as well.  If you don't have someone to stay with you, and you aren't safe to go home alone, we may keep you overnight. Insurance often won't pay for this.  After surgery  If it's hard to control your pain or you need more pain medicine, please call your surgeon's office.  Questions?   If you have any questions for your care team, list them here: _________________________________________________________________________________________________________________________________________________________________________ ____________________________________ ____________________________________ ____________________________________

## 2023-04-12 NOTE — PROGRESS NOTES
[facility]  [address] [address] [address]  Phone: [phone]  Primary Provider: Kehr, Kristen M  Pre-op Performing Provider: ULYSSES INIGUEZ      PREOPERATIVE EVALUATION:  Today's date: 4/12/2023    Kaykay Rockwell is a 67 year old female who presents for a preoperative evaluation.    Surgical Information:  Surgery/Procedure: OPEN REDUCTION INTERNAL FIXATION,BILATERAL PATELLA  Surgery Location: [facility]  Surgeon: Dr. Hernández  Surgery Date: 4/14/23  Time of Surgery:   Where patient plans to recover: At home with family  Fax number for surgical facility: Note does not need to be faxed, will be available electronically in Epic.    Assessment & Plan     The proposed surgical procedure is considered INTERMEDIATE risk.    Pre-op exam  - EKG 12-lead complete w/read - Clinics  - Hemoglobin A1c; Future  - Basic metabolic panel  (Ca, Cl, CO2, Creat, Gluc, K, Na, BUN); Future  - Hemoglobin; Future    Type 2 diabetes mellitus without complication, without long-term current use of insulin (H)  This is a relatively new diagnosis for her.  Diagnosed in January 2023, A1C was 11 at that time. Was started on Metformin.   A1C today is 9.0, improving.  Surgery is not elective and unlikely to be deferred.  However discussed she is at a higher risk for infection, should be monitored closely.  Continue to work with PCP on diabetic control.     - Hemoglobin A1c; Future  - Basic metabolic panel  (Ca, Cl, CO2, Creat, Gluc, K, Na, BUN); Future    Essential hypertension  Stable.         Risks and Recommendations:  The patient has the following additional risks and recommendations for perioperative complications:  Diabetes:  A1C is 9.0.       Medication Instructions:   - ACE/ARB: May be continued on the day of surgery.    - metformin: HOLD day of surgery.    RECOMMENDATION:  APPROVAL GIVEN to proceed with proposed procedure, without further diagnostic evaluation.      Subjective     HPI related  to upcoming procedure: Kaykay Rockwell  Is a 66 yo female who presents for a preop exam.  She will be having the above listed surgery to correct bilateral patella fractures sustained from falling.      Stopped smoking 2/5/23.        4/12/2023     4:07 PM   Preop Questions   1. Have you ever had a heart attack or stroke? No   2. Have you ever had surgery on your heart or blood vessels, such as a stent placement, a coronary artery bypass, or surgery on an artery in your head, neck, heart, or legs? No   3. Do you have chest pain with activity? No   4. Do you have a history of  heart failure? No   5. Do you currently have a cold, bronchitis or symptoms of other infection? No   6. Do you have a cough, shortness of breath, or wheezing? No   7. Do you or anyone in your family have previous history of blood clots? No   8. Do you or does anyone in your family have a serious bleeding problem such as prolonged bleeding following surgeries or cuts? No   9. Have you ever had problems with anemia or been told to take iron pills? No   10. Have you had any abnormal blood loss such as black, tarry or bloody stools, or abnormal vaginal bleeding? No   11. Have you ever had a blood transfusion? No   12. Are you willing to have a blood transfusion if it is medically needed before, during, or after your surgery? Yes   13. Have you or any of your relatives ever had problems with anesthesia? YES - patient has no personal history, but reports both daughters had n/v after surgery.    14. Do you have sleep apnea, excessive snoring or daytime drowsiness? No   15. Do you have any artifical heart valves or other implanted medical devices like a pacemaker, defibrillator, or continuous glucose monitor? No   16. Do you have artificial joints? No   17. Are you allergic to latex? No       Health Care Directive:  Patient does not have a Health Care Directive or Living Will: Patient states has Advance Directive and will bring in a copy to  clinic.    Preoperative Review of :   reviewed - controlled substances reflected in medication list.      Status of Chronic Conditions:  See problem list for active medical problems.  Problems all longstanding and stable, except as noted/documented.  See ROS for pertinent symptoms related to these conditions.      Review of Systems  CONSTITUTIONAL: NEGATIVE for fever, chills, change in weight  INTEGUMENTARY/SKIN: NEGATIVE for worrisome rashes, moles or lesions  EYES: NEGATIVE for vision changes or irritation  ENT/MOUTH: NEGATIVE for ear, mouth and throat problems  RESP: NEGATIVE for significant cough or SOB  CV: NEGATIVE for chest pain, palpitations or peripheral edema  GI: NEGATIVE for nausea, abdominal pain, heartburn, or change in bowel habits  : NEGATIVE for frequency, dysuria, or hematuria  MUSCULOSKELETAL: NEGATIVE for significant arthralgias or myalgia  NEURO: NEGATIVE for weakness, dizziness or paresthesias  ENDOCRINE: NEGATIVE for temperature intolerance, skin/hair changes  HEME: NEGATIVE for bleeding problems  PSYCHIATRIC: NEGATIVE for changes in mood or affect    Patient Active Problem List    Diagnosis Date Noted     Other closed fracture of patella with routine healing, unspecified laterality, subsequent encounter 04/12/2023     Priority: Medium     Added automatically from request for surgery [id number]       Essential hypertension 04/12/2023     Priority: Medium     Diabetes mellitus, type 2 (H) 04/06/2023     Priority: Medium     CARDIOVASCULAR SCREENING; LDL GOAL LESS THAN 130 10/31/2010     Priority: Medium      Past Medical History:   Diagnosis Date     Diabetes mellitus, type 2 (H) 04/06/2023     Hypertension      NO ACTIVE PROBLEMS      Patella fracture     DOI 3/31/23 B non displaced patellar fracture.     Past Surgical History:   Procedure Laterality Date     BIOPSY  November, 2022     ORTHOPEDIC SURGERY  2010     SURGICAL HISTORY OF -       left shoulder torn rotator     TONSILLECTOMY    "    Current Outpatient Medications   Medication Sig Dispense Refill     blood glucose (ACCU-CHEK GUIDE) test strip Use to test blood sugar 2 times daily or as directed. 200 strip 11     blood glucose monitoring (SOFTCLIX) lancets 1 each 2 times daily Use to test blood sugar 2 times daily or as directed. 100 each 11     Calcium Carb-Cholecalciferol 600-12.5 MG-MCG CAPS        clobetasol (TEMOVATE) 0.05 % external ointment Apply topically BID to the AA on the legs, do not use over open skin. Repeat daily for 2 weeks. Then use intermittently thereafter. Avoid use on the face, neck, groin, and underarms. 60 g 1     gentamicin (GARAMYCIN) 0.1 % external ointment Apply topically 3 times daily 30 g 1     ibuprofen (ADVIL/MOTRIN) 600 MG tablet Take 1 tablet (600 mg) by mouth every 6 hours as needed for moderate pain       lisinopril (ZESTRIL) 10 MG tablet Take 1 tablet (10 mg) by mouth daily 90 tablet 3     metFORMIN (GLUCOPHAGE XR) 500 MG 24 hr tablet Take 2 tablets (1,000 mg) by mouth 2 times daily (with meals) 360 tablet 1     Multiple Vitamins-Minerals (MULTI FOR HER 50+ PO) Take 1 tablet by mouth.       STATIN NOT PRESCRIBED (INTENTIONAL) Please choose reason not prescribed from choices below.       HYDROcodone-acetaminophen (NORCO) 5-325 MG tablet Take 1 tablet by mouth every 6 hours as needed for severe pain (Patient not taking: Reported on 4/12/2023)         Allergies   Allergen Reactions     Augmentin Itching and Rash     redness        Social History     Tobacco Use     Smoking status: Former     Packs/day: 0.50     Years: 39.00     Pack years: 19.50     Types: Cigarettes     Smokeless tobacco: Never   Vaping Use     Vaping status: Never Used   Substance Use Topics     Alcohol use: Yes     Comment: occ.       History   Drug Use No         Objective     /79   Pulse 95   Ht 1.651 m (5' 5\")   SpO2 97%   BMI 22.63 kg/m      Physical Exam    GENERAL APPEARANCE: healthy, alert and no distress     EYES: EOMI, " PERRL     HENT: nose and mouth without ulcers or lesions     NECK: no adenopathy, no asymmetry, masses, or scars and thyroid normal to palpation     RESP: lungs clear to auscultation - no rales, rhonchi or wheezes     CV: regular rates and rhythm, normal S1 S2, no S3 or S4 and no murmur, click or rub     MS: extremities normal- no gross deformities noted, no evidence of inflammation in joints, FROM in all extremities.     SKIN: no suspicious lesions or rashes     NEURO: Normal strength and tone, sensory exam grossly normal, mentation intact and speech normal     PSYCH: mentation appears normal. and affect normal/bright     LYMPHATICS: No cervical adenopathy    Recent Labs   Lab Test 01/30/23  0940 01/11/23  0656   NA  --  136   POTASSIUM  --  4.1   CR  --  0.64   A1C 11.0*  --         Diagnostics:    Recent Labs   Lab Test 04/12/23  1651 01/30/23  0940 01/11/23  0656     --  136   POTASSIUM 5.0  --  4.1   CHLORIDE 104  --  100   CO2  --   --  30   ANIONGAP  --   --  6   GLC  --  248* 288*   BUN  --   --  19   CR  --   --  0.64   ZECHARIAH  --   --  9.2     Hemoglobin   Date Value Ref Range Status   04/12/2023 13.2 11.7 - 15.7 g/dL Final     Lab Results   Component Value Date    A1C 9.0 04/12/2023    A1C 11.0 01/30/2023        EKG: appears normal, NSR, normal axis, normal intervals, no acute ST/T changes c/w ischemia, no LVH by voltage criteria, there are no prior tracings available    Revised Cardiac Risk Index (RCRI):  The patient has the following serious cardiovascular risks for perioperative complications:   - No serious cardiac risks = 0 points     RCRI Interpretation: 0 points: Class I (very low risk - 0.4% complication rate)           Signed Electronically by: Bev Brush CNP  Copy of this evaluation report is provided to requesting physician.

## 2023-04-12 NOTE — TELEPHONE ENCOUNTER
Type of surgery: OPEN REDUCTION INTERNAL FIXATION,BILATERAL PATELLA (Bilateral)    CPT 63087     Closed displaced fracture of patella, unspecified fracture morphology, bilateral, initial encounter S82.009A     Closed displaced comminuted fracture of left patella, initial encounter S82.042A     Closed nondisplaced fracture of left patella, unspecified fracture morphology, initial encounter S82.002A    Location of surgery: [facility] [place]  Date and time of surgery: 04/14/2023  Surgeon: Edgar  Pre-Op Appt Date: 04/12/2023  Post-Op Appt Date: 04/27/2023   Packet sent out: Yes  Pre-cert/Authorization completed: Per [facility] online portal, Notification/Prior Authorization not required for this service    Date: 4/14/23    Machelle Santana  Financial Securing/Prior Auth Dept  [phone]

## 2023-04-12 NOTE — H&P (VIEW-ONLY)
[facility]  [address] [address] [address]  Phone: [phone]  Primary Provider: Kehr, Kristen M  Pre-op Performing Provider: ULYSSES INIGUEZ      PREOPERATIVE EVALUATION:  Today's date: 4/12/2023    Kaykay Rockwell is a 67 year old female who presents for a preoperative evaluation.    Surgical Information:  Surgery/Procedure: OPEN REDUCTION INTERNAL FIXATION,BILATERAL PATELLA  Surgery Location: [place]  Surgeon: Dr. Hernández  Surgery Date: 4/14/23  Time of Surgery:   Where patient plans to recover: At home with family  Fax number for surgical facility: Note does not need to be faxed, will be available electronically in Epic.    Assessment & Plan     The proposed surgical procedure is considered INTERMEDIATE risk.    Pre-op exam  - EKG 12-lead complete w/read - Clinics  - Hemoglobin A1c; Future  - Basic metabolic panel  (Ca, Cl, CO2, Creat, Gluc, K, Na, BUN); Future  - Hemoglobin; Future    Type 2 diabetes mellitus without complication, without long-term current use of insulin (H)  This is a relatively new diagnosis for her.  Diagnosed in January 2023, A1C was 11 at that time. Was started on Metformin.   A1C today is 9.0, improving.  Surgery is not elective and unlikely to be deferred.  However discussed she is at a higher risk for infection, should be monitored closely.  Continue to work with PCP on diabetic control.     - Hemoglobin A1c; Future  - Basic metabolic panel  (Ca, Cl, CO2, Creat, Gluc, K, Na, BUN); Future    Essential hypertension  Stable.         Risks and Recommendations:  The patient has the following additional risks and recommendations for perioperative complications:  Diabetes:  A1C is 9.0.       Medication Instructions:   - ACE/ARB: May be continued on the day of surgery.    - metformin: HOLD day of surgery.    RECOMMENDATION:  APPROVAL GIVEN to proceed with proposed procedure, without further diagnostic evaluation.      Subjective     HPI related  to upcoming procedure: Kaykay Rockwell  Is a 68 yo female who presents for a preop exam.  She will be having the above listed surgery to correct bilateral patella fractures sustained from falling.      Stopped smoking 2/5/23.        4/12/2023     4:07 PM   Preop Questions   1. Have you ever had a heart attack or stroke? No   2. Have you ever had surgery on your heart or blood vessels, such as a stent placement, a coronary artery bypass, or surgery on an artery in your head, neck, heart, or legs? No   3. Do you have chest pain with activity? No   4. Do you have a history of  heart failure? No   5. Do you currently have a cold, bronchitis or symptoms of other infection? No   6. Do you have a cough, shortness of breath, or wheezing? No   7. Do you or anyone in your family have previous history of blood clots? No   8. Do you or does anyone in your family have a serious bleeding problem such as prolonged bleeding following surgeries or cuts? No   9. Have you ever had problems with anemia or been told to take iron pills? No   10. Have you had any abnormal blood loss such as black, tarry or bloody stools, or abnormal vaginal bleeding? No   11. Have you ever had a blood transfusion? No   12. Are you willing to have a blood transfusion if it is medically needed before, during, or after your surgery? Yes   13. Have you or any of your relatives ever had problems with anesthesia? YES - patient has no personal history, but reports both daughters had n/v after surgery.    14. Do you have sleep apnea, excessive snoring or daytime drowsiness? No   15. Do you have any artifical heart valves or other implanted medical devices like a pacemaker, defibrillator, or continuous glucose monitor? No   16. Do you have artificial joints? No   17. Are you allergic to latex? No       Health Care Directive:  Patient does not have a Health Care Directive or Living Will: Patient states has Advance Directive and will bring in a copy to  clinic.    Preoperative Review of :   reviewed - controlled substances reflected in medication list.      Status of Chronic Conditions:  See problem list for active medical problems.  Problems all longstanding and stable, except as noted/documented.  See ROS for pertinent symptoms related to these conditions.      Review of Systems  CONSTITUTIONAL: NEGATIVE for fever, chills, change in weight  INTEGUMENTARY/SKIN: NEGATIVE for worrisome rashes, moles or lesions  EYES: NEGATIVE for vision changes or irritation  ENT/MOUTH: NEGATIVE for ear, mouth and throat problems  RESP: NEGATIVE for significant cough or SOB  CV: NEGATIVE for chest pain, palpitations or peripheral edema  GI: NEGATIVE for nausea, abdominal pain, heartburn, or change in bowel habits  : NEGATIVE for frequency, dysuria, or hematuria  MUSCULOSKELETAL: NEGATIVE for significant arthralgias or myalgia  NEURO: NEGATIVE for weakness, dizziness or paresthesias  ENDOCRINE: NEGATIVE for temperature intolerance, skin/hair changes  HEME: NEGATIVE for bleeding problems  PSYCHIATRIC: NEGATIVE for changes in mood or affect    Patient Active Problem List    Diagnosis Date Noted     Other closed fracture of patella with routine healing, unspecified laterality, subsequent encounter 04/12/2023     Priority: Medium     Added automatically from request for surgery [id number]       Essential hypertension 04/12/2023     Priority: Medium     Diabetes mellitus, type 2 (H) 04/06/2023     Priority: Medium     CARDIOVASCULAR SCREENING; LDL GOAL LESS THAN 130 10/31/2010     Priority: Medium      Past Medical History:   Diagnosis Date     Diabetes mellitus, type 2 (H) 04/06/2023     Hypertension      NO ACTIVE PROBLEMS      Patella fracture     DOI 3/31/23 B non displaced patellar fracture.     Past Surgical History:   Procedure Laterality Date     BIOPSY  November, 2022     ORTHOPEDIC SURGERY  2010     SURGICAL HISTORY OF -       left shoulder torn rotator     TONSILLECTOMY    "    Current Outpatient Medications   Medication Sig Dispense Refill     blood glucose (ACCU-CHEK GUIDE) test strip Use to test blood sugar 2 times daily or as directed. 200 strip 11     blood glucose monitoring (SOFTCLIX) lancets 1 each 2 times daily Use to test blood sugar 2 times daily or as directed. 100 each 11     Calcium Carb-Cholecalciferol 600-12.5 MG-MCG CAPS        clobetasol (TEMOVATE) 0.05 % external ointment Apply topically BID to the AA on the legs, do not use over open skin. Repeat daily for 2 weeks. Then use intermittently thereafter. Avoid use on the face, neck, groin, and underarms. 60 g 1     gentamicin (GARAMYCIN) 0.1 % external ointment Apply topically 3 times daily 30 g 1     ibuprofen (ADVIL/MOTRIN) 600 MG tablet Take 1 tablet (600 mg) by mouth every 6 hours as needed for moderate pain       lisinopril (ZESTRIL) 10 MG tablet Take 1 tablet (10 mg) by mouth daily 90 tablet 3     metFORMIN (GLUCOPHAGE XR) 500 MG 24 hr tablet Take 2 tablets (1,000 mg) by mouth 2 times daily (with meals) 360 tablet 1     Multiple Vitamins-Minerals (MULTI FOR HER 50+ PO) Take 1 tablet by mouth.       STATIN NOT PRESCRIBED (INTENTIONAL) Please choose reason not prescribed from choices below.       HYDROcodone-acetaminophen (NORCO) 5-325 MG tablet Take 1 tablet by mouth every 6 hours as needed for severe pain (Patient not taking: Reported on 4/12/2023)         Allergies   Allergen Reactions     Augmentin Itching and Rash     redness        Social History     Tobacco Use     Smoking status: Former     Packs/day: 0.50     Years: 39.00     Pack years: 19.50     Types: Cigarettes     Smokeless tobacco: Never   Vaping Use     Vaping status: Never Used   Substance Use Topics     Alcohol use: Yes     Comment: occ.       History   Drug Use No         Objective     /79   Pulse 95   Ht 1.651 m (5' 5\")   SpO2 97%   BMI 22.63 kg/m      Physical Exam    GENERAL APPEARANCE: healthy, alert and no distress     EYES: EOMI, " PERRL     HENT: nose and mouth without ulcers or lesions     NECK: no adenopathy, no asymmetry, masses, or scars and thyroid normal to palpation     RESP: lungs clear to auscultation - no rales, rhonchi or wheezes     CV: regular rates and rhythm, normal S1 S2, no S3 or S4 and no murmur, click or rub     MS: extremities normal- no gross deformities noted, no evidence of inflammation in joints, FROM in all extremities.     SKIN: no suspicious lesions or rashes     NEURO: Normal strength and tone, sensory exam grossly normal, mentation intact and speech normal     PSYCH: mentation appears normal. and affect normal/bright     LYMPHATICS: No cervical adenopathy    Recent Labs   Lab Test 01/30/23  0940 01/11/23  0656   NA  --  136   POTASSIUM  --  4.1   CR  --  0.64   A1C 11.0*  --         Diagnostics:    Recent Labs   Lab Test 04/12/23  1651 01/30/23  0940 01/11/23  0656     --  136   POTASSIUM 5.0  --  4.1   CHLORIDE 104  --  100   CO2  --   --  30   ANIONGAP  --   --  6   GLC  --  248* 288*   BUN  --   --  19   CR  --   --  0.64   ZECHARIAH  --   --  9.2     Hemoglobin   Date Value Ref Range Status   04/12/2023 13.2 11.7 - 15.7 g/dL Final     Lab Results   Component Value Date    A1C 9.0 04/12/2023    A1C 11.0 01/30/2023        EKG: appears normal, NSR, normal axis, normal intervals, no acute ST/T changes c/w ischemia, no LVH by voltage criteria, there are no prior tracings available    Revised Cardiac Risk Index (RCRI):  The patient has the following serious cardiovascular risks for perioperative complications:   - No serious cardiac risks = 0 points     RCRI Interpretation: 0 points: Class I (very low risk - 0.4% complication rate)           Signed Electronically by: Bev Brush CNP  Copy of this evaluation report is provided to requesting physician.

## 2023-04-12 NOTE — TELEPHONE ENCOUNTER
I have sent Dr. Hernández a message that 04/13 is full at [facility] and he is on vacation on 04/14/2023.

## 2023-04-13 ENCOUNTER — ANESTHESIA EVENT (OUTPATIENT)
Dept: SURGERY | Facility: AMBULATORY SURGERY CENTER | Age: 68
End: 2023-04-13
Payer: COMMERCIAL

## 2023-04-13 LAB
ANION GAP SERPL CALCULATED.3IONS-SCNC: 5 MMOL/L (ref 3–14)
BUN SERPL-MCNC: 52 MG/DL (ref 7–30)
CALCIUM SERPL-MCNC: 10 MG/DL (ref 8.5–10.1)
CHLORIDE BLD-SCNC: 104 MMOL/L (ref 94–109)
CO2 SERPL-SCNC: 25 MMOL/L (ref 20–32)
CREAT SERPL-MCNC: 0.8 MG/DL (ref 0.52–1.04)
GFR SERPL CREATININE-BSD FRML MDRD: 80 ML/MIN/1.73M2
GLUCOSE BLD-MCNC: 293 MG/DL (ref 70–99)
POTASSIUM BLD-SCNC: 5 MMOL/L (ref 3.4–5.3)
SODIUM SERPL-SCNC: 134 MMOL/L (ref 133–144)

## 2023-04-13 RX ORDER — OXYCODONE HYDROCHLORIDE 5 MG/1
5 TABLET ORAL
Status: ACTIVE | OUTPATIENT
Start: 2023-04-13 | End: 2023-04-14

## 2023-04-13 NOTE — ANESTHESIA PREPROCEDURE EVALUATION
Anesthesia Pre-Procedure Evaluation    Patient: Kaykay Rockwell   MRN: [id number] : 1955        Procedure : Procedure(s):  OPEN REDUCTION INTERNAL FIXATION,BILATERAL PATELLA          Past Medical History:   Diagnosis Date     Diabetes mellitus, type 2 (H) 2023     Hypertension      NO ACTIVE PROBLEMS      Patella fracture     DOI 3/31/23 B non displaced patellar fracture.      Past Surgical History:   Procedure Laterality Date     BIOPSY       ORTHOPEDIC SURGERY       SURGICAL HISTORY OF -       left shoulder torn rotator     TONSILLECTOMY        Allergies   Allergen Reactions     Augmentin Itching and Rash     redness      Social History     Tobacco Use     Smoking status: Former     Packs/day: 0.50     Years: 39.00     Pack years: 19.50     Types: Cigarettes     Smokeless tobacco: Never   Vaping Use     Vaping status: Never Used   Substance Use Topics     Alcohol use: Yes     Comment: occ.      Wt Readings from Last 1 Encounters:   23 61.7 kg (136 lb)           Physical Exam    Airway        Mallampati: III   TM distance: > 3 FB   Neck ROM: full   Mouth opening: > 3 cm    Respiratory Devices and Support         Dental       (+) Minor Abnormalities - some fillings, tiny chips      Cardiovascular   cardiovascular exam normal          Pulmonary   pulmonary exam normal                OUTSIDE LABS:  CBC:   Lab Results   Component Value Date    HGB 13.2 2023     BMP:   Lab Results   Component Value Date     2023     2023    POTASSIUM 5.0 2023    POTASSIUM 4.1 2023    CHLORIDE 104 2023    CHLORIDE 100 2023    CO2 25 2023    CO2 30 2023    BUN 52 (H) 2023    BUN 19 2023    CR 0.80 2023    CR 0.64 2023     (H) 2023     (H) 2023     COAGS: No results found for: PTT, INR, FIBR  POC: No results found for: BGM, HCG, HCGS  HEPATIC: No results found for: ALBUMIN, PROTTOTAL,  ALT, AST, GGT, ALKPHOS, BILITOTAL, BILIDIRECT, FAITH  OTHER:   Lab Results   Component Value Date    A1C 9.0 (H) 04/12/2023    ZECHARIAH 10.0 04/12/2023    TSH 2.39 01/11/2023       Anesthesia Plan    ASA Status:  3   NPO Status:  NPO Appropriate    Anesthesia Type: General.     - Airway: LMA   Induction: Intravenous, Propofol.   Maintenance: Balanced.        Consents    Anesthesia Plan(s) and associated risks, benefits, and realistic alternatives discussed. Questions answered and patient/representative(s) expressed understanding.    - Discussed:     - Discussed with:  Patient, Spouse      - Extended Intubation/Ventilatory Support Discussed: No.      - Patient is DNR/DNI Status: No    Use of blood products discussed: No .     Postoperative Care    Pain management: IV analgesics, Oral pain medications, Multi-modal analgesia, Peripheral nerve block (Single Shot).   PONV prophylaxis: Dexamethasone or Solumedrol, Ondansetron (or other 5HT-3), Background Propofol Infusion     Comments:    Other Comments: Bilateral Adductor canal blocks            Jordan Mcneal MD

## 2023-04-14 ENCOUNTER — ANCILLARY PROCEDURE (OUTPATIENT)
Dept: GENERAL RADIOLOGY | Facility: CLINIC | Age: 68
End: 2023-04-14
Attending: ORTHOPAEDIC SURGERY
Payer: COMMERCIAL

## 2023-04-14 ENCOUNTER — MYC MEDICAL ADVICE (OUTPATIENT)
Dept: EDUCATION SERVICES | Facility: CLINIC | Age: 68
End: 2023-04-14

## 2023-04-14 ENCOUNTER — HOSPITAL ENCOUNTER (OUTPATIENT)
Facility: AMBULATORY SURGERY CENTER | Age: 68
Discharge: HOME | End: 2023-04-14
Attending: ORTHOPAEDIC SURGERY | Admitting: ORTHOPAEDIC SURGERY
Payer: COMMERCIAL

## 2023-04-14 ENCOUNTER — ANESTHESIA (OUTPATIENT)
Dept: SURGERY | Facility: AMBULATORY SURGERY CENTER | Age: 68
End: 2023-04-14
Payer: COMMERCIAL

## 2023-04-14 VITALS
RESPIRATION RATE: 16 BRPM | OXYGEN SATURATION: 94 % | HEART RATE: 85 BPM | DIASTOLIC BLOOD PRESSURE: 82 MMHG | HEIGHT: 65 IN | TEMPERATURE: 98 F | BODY MASS INDEX: 22.66 KG/M2 | WEIGHT: 136 LBS | SYSTOLIC BLOOD PRESSURE: 149 MMHG

## 2023-04-14 DIAGNOSIS — S82.099D: ICD-10-CM

## 2023-04-14 DIAGNOSIS — R52 PAIN: ICD-10-CM

## 2023-04-14 LAB
GLUCOSE BLDC GLUCOMTR-MCNC: 217 MG/DL (ref 70–99)
GLUCOSE BLDC GLUCOMTR-MCNC: 236 MG/DL (ref 70–99)

## 2023-04-14 PROCEDURE — G8907 PT DOC NO EVENTS ON DISCHARG: HCPCS

## 2023-04-14 PROCEDURE — G8916 PT W IV AB GIVEN ON TIME: HCPCS

## 2023-04-14 PROCEDURE — 27524 TREAT KNEECAP FRACTURE: CPT | Mod: 50 | Performed by: ORTHOPAEDIC SURGERY

## 2023-04-14 PROCEDURE — 27524 TREAT KNEECAP FRACTURE: CPT | Mod: LT

## 2023-04-14 DEVICE — IMPLANTABLE DEVICE: Type: IMPLANTABLE DEVICE | Site: KNEE | Status: FUNCTIONAL

## 2023-04-14 RX ORDER — SODIUM CHLORIDE, SODIUM LACTATE, POTASSIUM CHLORIDE, CALCIUM CHLORIDE 600; 310; 30; 20 MG/100ML; MG/100ML; MG/100ML; MG/100ML
INJECTION, SOLUTION INTRAVENOUS CONTINUOUS
Status: DISCONTINUED | OUTPATIENT
Start: 2023-04-14 | End: 2023-04-15 | Stop reason: HOSPADM

## 2023-04-14 RX ORDER — CLINDAMYCIN PHOSPHATE 900 MG/50ML
900 INJECTION, SOLUTION INTRAVENOUS SEE ADMIN INSTRUCTIONS
Status: DISCONTINUED | OUTPATIENT
Start: 2023-04-14 | End: 2023-04-15 | Stop reason: HOSPADM

## 2023-04-14 RX ORDER — PROPOFOL 10 MG/ML
INJECTION, EMULSION INTRAVENOUS PRN
Status: DISCONTINUED | OUTPATIENT
Start: 2023-04-14 | End: 2023-04-14

## 2023-04-14 RX ORDER — ASPIRIN 325 MG
325 TABLET, DELAYED RELEASE (ENTERIC COATED) ORAL 2 TIMES DAILY WITH MEALS
Qty: 60 TABLET | Refills: 0 | Status: SHIPPED | OUTPATIENT
Start: 2023-04-14 | End: 2024-01-17

## 2023-04-14 RX ORDER — CELECOXIB 200 MG/1
400 CAPSULE ORAL ONCE
Status: COMPLETED | OUTPATIENT
Start: 2023-04-14 | End: 2023-04-14

## 2023-04-14 RX ORDER — ACETAMINOPHEN 325 MG/1
975 TABLET ORAL ONCE
Status: COMPLETED | OUTPATIENT
Start: 2023-04-14 | End: 2023-04-14

## 2023-04-14 RX ORDER — ONDANSETRON 4 MG/1
4 TABLET, ORALLY DISINTEGRATING ORAL EVERY 30 MIN PRN
Status: DISCONTINUED | OUTPATIENT
Start: 2023-04-14 | End: 2023-04-15 | Stop reason: HOSPADM

## 2023-04-14 RX ORDER — SCOLOPAMINE TRANSDERMAL SYSTEM 1 MG/1
1 PATCH, EXTENDED RELEASE TRANSDERMAL
Status: DISCONTINUED | OUTPATIENT
Start: 2023-04-14 | End: 2023-04-15 | Stop reason: HOSPADM

## 2023-04-14 RX ORDER — CLINDAMYCIN PHOSPHATE 900 MG/50ML
900 INJECTION, SOLUTION INTRAVENOUS
Status: COMPLETED | OUTPATIENT
Start: 2023-04-14 | End: 2023-04-14

## 2023-04-14 RX ORDER — LIDOCAINE HYDROCHLORIDE 20 MG/ML
INJECTION, SOLUTION INFILTRATION; PERINEURAL PRN
Status: DISCONTINUED | OUTPATIENT
Start: 2023-04-14 | End: 2023-04-14

## 2023-04-14 RX ORDER — AMOXICILLIN 250 MG
1-2 CAPSULE ORAL 2 TIMES DAILY
Qty: 30 TABLET | Refills: 0 | Status: SHIPPED | OUTPATIENT
Start: 2023-04-14 | End: 2023-04-27

## 2023-04-14 RX ORDER — ONDANSETRON 2 MG/ML
4 INJECTION INTRAMUSCULAR; INTRAVENOUS EVERY 30 MIN PRN
Status: DISCONTINUED | OUTPATIENT
Start: 2023-04-14 | End: 2023-04-15 | Stop reason: HOSPADM

## 2023-04-14 RX ORDER — ACETAMINOPHEN 325 MG/1
650 TABLET ORAL EVERY 4 HOURS PRN
Qty: 50 TABLET | Refills: 0 | Status: SHIPPED | OUTPATIENT
Start: 2023-04-14

## 2023-04-14 RX ORDER — BUPIVACAINE HYDROCHLORIDE AND EPINEPHRINE 2.5; 5 MG/ML; UG/ML
INJECTION, SOLUTION INFILTRATION; PERINEURAL
Status: DISCONTINUED | OUTPATIENT
Start: 2023-04-14 | End: 2023-04-14

## 2023-04-14 RX ORDER — FENTANYL CITRATE 50 UG/ML
25 INJECTION, SOLUTION INTRAMUSCULAR; INTRAVENOUS EVERY 5 MIN PRN
Status: DISCONTINUED | OUTPATIENT
Start: 2023-04-14 | End: 2023-04-15 | Stop reason: HOSPADM

## 2023-04-14 RX ORDER — FENTANYL CITRATE 50 UG/ML
INJECTION, SOLUTION INTRAMUSCULAR; INTRAVENOUS PRN
Status: DISCONTINUED | OUTPATIENT
Start: 2023-04-14 | End: 2023-04-14

## 2023-04-14 RX ORDER — VASOPRESSIN IN 0.9 % NACL 2 UNIT/2ML
SYRINGE (ML) INTRAVENOUS PRN
Status: DISCONTINUED | OUTPATIENT
Start: 2023-04-14 | End: 2023-04-14

## 2023-04-14 RX ORDER — ONDANSETRON 4 MG/1
4 TABLET, ORALLY DISINTEGRATING ORAL EVERY 8 HOURS PRN
Qty: 4 TABLET | Refills: 0 | Status: SHIPPED | OUTPATIENT
Start: 2023-04-14 | End: 2023-04-27

## 2023-04-14 RX ORDER — BUPIVACAINE HYDROCHLORIDE 2.5 MG/ML
INJECTION, SOLUTION INFILTRATION; PERINEURAL PRN
Status: DISCONTINUED | OUTPATIENT
Start: 2023-04-14 | End: 2023-04-14 | Stop reason: HOSPADM

## 2023-04-14 RX ORDER — OXYCODONE HYDROCHLORIDE 5 MG/1
5-10 TABLET ORAL EVERY 4 HOURS PRN
Qty: 26 TABLET | Refills: 0 | Status: SHIPPED | OUTPATIENT
Start: 2023-04-14 | End: 2023-04-27

## 2023-04-14 RX ORDER — CELECOXIB 200 MG/1
200 CAPSULE ORAL
Status: DISCONTINUED | OUTPATIENT
Start: 2023-04-14 | End: 2023-04-15 | Stop reason: HOSPADM

## 2023-04-14 RX ORDER — OXYCODONE HYDROCHLORIDE 5 MG/1
5 TABLET ORAL
Status: DISCONTINUED | OUTPATIENT
Start: 2023-04-14 | End: 2023-04-15 | Stop reason: HOSPADM

## 2023-04-14 RX ORDER — ONDANSETRON 2 MG/ML
INJECTION INTRAMUSCULAR; INTRAVENOUS PRN
Status: DISCONTINUED | OUTPATIENT
Start: 2023-04-14 | End: 2023-04-14

## 2023-04-14 RX ORDER — HYDROMORPHONE HYDROCHLORIDE 4 MG/ML
INJECTION, SOLUTION INTRAMUSCULAR; INTRAVENOUS; SUBCUTANEOUS PRN
Status: DISCONTINUED | OUTPATIENT
Start: 2023-04-14 | End: 2023-04-14

## 2023-04-14 RX ORDER — PROPOFOL 10 MG/ML
INJECTION, EMULSION INTRAVENOUS CONTINUOUS PRN
Status: DISCONTINUED | OUTPATIENT
Start: 2023-04-14 | End: 2023-04-14

## 2023-04-14 RX ORDER — LIDOCAINE 40 MG/G
CREAM TOPICAL
Status: DISCONTINUED | OUTPATIENT
Start: 2023-04-14 | End: 2023-04-15 | Stop reason: HOSPADM

## 2023-04-14 RX ADMIN — Medication 0.2 MCG/KG/MIN: at 07:56

## 2023-04-14 RX ADMIN — PROPOFOL 150 MG: 10 INJECTION, EMULSION INTRAVENOUS at 07:33

## 2023-04-14 RX ADMIN — ONDANSETRON 4 MG: 2 INJECTION INTRAMUSCULAR; INTRAVENOUS at 11:22

## 2023-04-14 RX ADMIN — Medication 100 MCG: at 07:40

## 2023-04-14 RX ADMIN — SODIUM CHLORIDE, SODIUM LACTATE, POTASSIUM CHLORIDE, CALCIUM CHLORIDE: 600; 310; 30; 20 INJECTION, SOLUTION INTRAVENOUS at 10:14

## 2023-04-14 RX ADMIN — BUPIVACAINE HYDROCHLORIDE AND EPINEPHRINE 20 ML: 2.5; 5 INJECTION, SOLUTION INFILTRATION; PERINEURAL at 11:50

## 2023-04-14 RX ADMIN — CLINDAMYCIN PHOSPHATE 900 MG: 900 INJECTION, SOLUTION INTRAVENOUS at 07:30

## 2023-04-14 RX ADMIN — LIDOCAINE HYDROCHLORIDE 60 MG: 20 INJECTION, SOLUTION INFILTRATION; PERINEURAL at 07:33

## 2023-04-14 RX ADMIN — HYDROMORPHONE HYDROCHLORIDE 0.2 MG: 4 INJECTION, SOLUTION INTRAMUSCULAR; INTRAVENOUS; SUBCUTANEOUS at 10:50

## 2023-04-14 RX ADMIN — Medication 100 MCG: at 08:01

## 2023-04-14 RX ADMIN — FENTANYL CITRATE 50 MCG: 50 INJECTION, SOLUTION INTRAMUSCULAR; INTRAVENOUS at 07:56

## 2023-04-14 RX ADMIN — Medication 100 MCG: at 10:01

## 2023-04-14 RX ADMIN — FENTANYL CITRATE 50 MCG: 50 INJECTION, SOLUTION INTRAMUSCULAR; INTRAVENOUS at 07:33

## 2023-04-14 RX ADMIN — HYDROMORPHONE HYDROCHLORIDE 0.2 MG: 4 INJECTION, SOLUTION INTRAMUSCULAR; INTRAVENOUS; SUBCUTANEOUS at 09:17

## 2023-04-14 RX ADMIN — PROPOFOL 50 MCG/KG/MIN: 10 INJECTION, EMULSION INTRAVENOUS at 07:33

## 2023-04-14 RX ADMIN — CELECOXIB 400 MG: 200 CAPSULE ORAL at 06:35

## 2023-04-14 RX ADMIN — SCOLOPAMINE TRANSDERMAL SYSTEM 1 PATCH: 1 PATCH, EXTENDED RELEASE TRANSDERMAL at 14:30

## 2023-04-14 RX ADMIN — SODIUM CHLORIDE, SODIUM LACTATE, POTASSIUM CHLORIDE, CALCIUM CHLORIDE: 600; 310; 30; 20 INJECTION, SOLUTION INTRAVENOUS at 06:51

## 2023-04-14 RX ADMIN — Medication 1 UNITS: at 08:08

## 2023-04-14 RX ADMIN — ONDANSETRON 4 MG: 2 INJECTION INTRAMUSCULAR; INTRAVENOUS at 13:01

## 2023-04-14 RX ADMIN — ACETAMINOPHEN 975 MG: 325 TABLET ORAL at 06:35

## 2023-04-14 RX ADMIN — CLINDAMYCIN PHOSPHATE 900 MG: 900 INJECTION, SOLUTION INTRAVENOUS at 11:37

## 2023-04-14 RX ADMIN — Medication 100 MCG: at 07:43

## 2023-04-14 RX ADMIN — HYDROMORPHONE HYDROCHLORIDE 0.2 MG: 4 INJECTION, SOLUTION INTRAMUSCULAR; INTRAVENOUS; SUBCUTANEOUS at 10:40

## 2023-04-14 NOTE — ANESTHESIA POSTPROCEDURE EVALUATION
Patient: Kaykay Rockwell    Procedure: Procedure(s):  OPEN REDUCTION INTERNAL FIXATION,BILATERAL PATELLA       Anesthesia Type:  General    Note:  Disposition: Outpatient   Postop Pain Control: Uneventful            Sign Out: Well controlled pain   PONV: Yes            Symptoms: Nausea + Vomiting            Sign Out: Ongoing Nausea   Neuro/Psych: Uneventful            Sign Out: Acceptable/Baseline neuro status   Airway/Respiratory: Uneventful            Sign Out: Acceptable/Baseline resp. status   CV/Hemodynamics: Uneventful            Sign Out: Acceptable CV status; No obvious hypovolemia; No obvious fluid overload   Other NRE:    DID A NON-ROUTINE EVENT OCCUR? No           Last vitals:  Vitals Value Taken Time   /76 04/14/23 1235   Temp 97.5  F (36.4  C) 04/14/23 1214   Pulse 88 04/14/23 1245   Resp 13 04/14/23 1245   SpO2 97 % 04/14/23 1245   Vitals shown include unvalidated device data.    Electronically Signed By: Jordan Mcneal MD  April 14, 2023  12:45 PM

## 2023-04-14 NOTE — INTERVAL H&P NOTE
I have reviewed the surgical (or preoperative) H&P that is linked to this encounter, and examined the patient. There are no significant changes    Clinical Conditions Present on Arrival:  Clinically Significant Risk Factors Present on Admission         # Hyponatremia: Lowest Na = 134 mmol/L in last 30 days, will monitor as appropriate          # DMII: A1C = 9.0 % (Ref range: 0.0 - 5.6 %) within past 6 months

## 2023-04-14 NOTE — ANESTHESIA PROCEDURE NOTES
Adductor canal Procedure Note    Pre-Procedure   Staff -        Anesthesiologist:  Jordan Mcneal MD       Performed By: anesthesiologist       Location: OR       Procedure Start/Stop Times: 4/14/2023 11:50 AM and 4/14/2023 12:00 PM       Pre-Anesthestic Checklist: patient identified, IV checked, site marked, risks and benefits discussed, informed consent, monitors and equipment checked, pre-op evaluation, at physician/surgeon's request and post-op pain management  Timeout:       Correct Patient: Yes        Correct Procedure: Yes        Correct Site: Yes        Correct Position: Yes        Correct Laterality: Yes        Site Marked: Yes  Procedure Documentation  Procedure: Adductor canal       Diagnosis: BILATERAL PATELLAR FRACTURES       Laterality: bilateral       Patient Position: supine       Patient Prep/Sterile Barriers: sterile gloves, mask       Skin prep: Chloraprep       Needle Type: short bevel       Needle Gauge: 21.        Needle Length (millimeters): 100        Ultrasound guided       1. Ultrasound was used to identify targeted nerve, plexus, vascular marker, or fascial plane and place a needle adjacent to it in real-time.       2. Ultrasound was used to visualize the spread of anesthetic in close proximity to the above referenced structure.       3. A permanent image is entered into the patient's record.       4. The visualized anatomic structures appeared normal.       5. There were no apparent abnormal pathologic findings.    Assessment/Narrative         The placement was negative for: blood aspirated, painful injection and site bleeding       Paresthesias: No.       Bolus given via needle. no blood aspirated via catheter.        Secured via.        Insertion/Infusion Method: Single Shot       Complications: none       Injection made incrementally with aspirations every 5 mL.    Medication(s) Administered   Bupivacaine 0.25% w/ 1:200K Epi (Injection) - Injection   20 mL - 4/14/2023 11:50:00  "AM  Bupivacaine liposome (Exparel) 1.3% LA inj susp (Infiltration) - Infiltration   20 mL - 4/14/2023 11:50:00 AM  Medication Administration Time: 4/14/2023 11:50 AM     Comments:  Bilateral blocks    266mg total Exparel injected      FOR [facility] ONLY:   Pain Team Contact information: please page the Pain Team Via [email or web address]. Search \"Pain\". During daytime hours, please page the attending first. At night please page the resident first.      "

## 2023-04-14 NOTE — ANESTHESIA CARE TRANSFER NOTE
Patient: Kaykay Rockwell    Procedure: Procedure(s):  OPEN REDUCTION INTERNAL FIXATION,BILATERAL PATELLA       Diagnosis: Other closed fracture of patella with routine healing, unspecified laterality, subsequent encounter [O50.851R]  Diagnosis Additional Information: No value filed.    Anesthesia Type:   General     Note:    Oropharynx: oropharynx clear of all foreign objects  Level of Consciousness: awake  Oxygen Supplementation: nasal cannula    Independent Airway: airway patency satisfactory and stable  Dentition: dentition unchanged  Vital Signs Stable: post-procedure vital signs reviewed and stable  Report to RN Given: handoff report given  Patient transferred to: PACU    Handoff Report: Identifed the Patient, Identified the Reponsible Provider, Reviewed the pertinent medical history, Discussed the surgical course, Reviewed Intra-OP anesthesia mangement and issues during anesthesia, Set expectations for post-procedure period and Allowed opportunity for questions and acknowledgement of understanding      Vitals:  Vitals Value Taken Time   /70 04/14/23 1214   Temp 97.5  F (36.4  C) 04/14/23 1214   Pulse 89 04/14/23 1215   Resp 32 04/14/23 1215   SpO2 99 % 04/14/23 1215   Vitals shown include unvalidated device data.    Electronically Signed By: JOI Bear CRNA  April 14, 2023  12:16 PM

## 2023-04-14 NOTE — DISCHARGE INSTRUCTIONS
You had 975 mg of Tylenol at 6:30 AM. You may repeat this after 12:30 PM today. Maximum amount of Tylenol/Acetaminophen in a 24 hour period is 4,000 mg.    You received Celebrex, a nonsteroidal anti-inflammatory drug (NSAID) at 6:30 AM  Do not take any ibuprofen products until 12:30 PM.       [facility]  Same-Day Surgery   Adult Discharge Orders & Instructions   For 24 hours after surgery  Get plenty of rest.  A responsible adult must stay with you for at least 24 hours after you leave the hospital.   Do not drive or use heavy equipment.  If you have weakness or tingling, don't drive or use heavy equipment until this feeling goes away.  Do not drink alcohol.  Avoid strenuous or risky activities.  Ask for help when climbing stairs.   You may feel lightheaded.  IF so, sit for a few minutes before standing.  Have someone help you get up.   If you have nausea (feel sick to your stomach): Drink only clear liquids such as apple juice, ginger ale, broth or 7-Up.  Rest may also help.  Be sure to drink enough fluids.  Move to a regular diet as you feel able.  You may have a slight fever. Call the doctor if your fever is over 100 F (37.7 C) (taken under the tongue) or lasts longer than 24 hours.  You may have a dry mouth, a sore throat, muscle aches or trouble sleeping.  These should go away after 24 hours.  Do not make important or legal decisions.   Call your doctor for any of the followin.  Signs of infection (fever, growing tenderness at the surgery site, a large amount of drainage or bleeding, severe pain, foul-smelling drainage, redness, swelling).  2. It has been over 8 to 10 hours since surgery and you are still not able to urinate (pass water).  3.  Headache for over 24 hours.  4.  Numbness, tingling or weakness the day after surgery (if you had spinal anesthesia).

## 2023-04-14 NOTE — OP NOTE
OPERATIVE NOTE:    PRE-OP DIAGNOSIS: Bilateral comminuted patella fractures     POST-OP DIAGNOSIS: Bilateral comminuted patella fractures    PROCEDURE: Open reduction and internal fixation bilateral patella fractures    SURGEON: Edgar    ASSISTANT(S): Chuck Arredondo PA-C    ANESTHESIA: General + postoperative adductor canal blocks.    INDICATIONS: Patient is a 67-year-old female who fell on both knees about 2 weeks ago.  She sustained comminuted bilateral patella fractures.  Both were characterized by a significantly depressed distal fragment with 4 to 5 mm of step-off of each.  The right knee demonstrated significant comminution of the dorsal surface but only 2 fracture lines into the joint.  The left was a more simple oblique fracture with a little bit of distal comminution, but again with a significant depressed fragment distally.  Even though she had good quad control, we could not leave the articular surface with a step-off like was seen.  Informed consent was obtained for the procedure    PROCEDURE: She was taken to the operating room and then placed on the operating table in the supine position.  Tourniquet was placed around both legs.  Both legs were prepped and draped in usual sterile fashion.  Pause for site confirmation performed.    The right leg was exsanguinated and the tourniquet plated to 300 mmHg.  Local anesthetic using quarter percent Marcaine was injected in the anticipated incision site.  Then a midline incision was made on the right knee.  Incision was taken down through the skin and subcutaneous tissue to the level of the extensor mechanism.  The main fracture was identified as an oblique line in the patella.  I had to incise the overlying tendon to expose the main fracture line.  There was some vertical fracture lines distally.  All of the fractures were being held together quite well by soft tissues.  I incised the medial retinaculum just enough so I could feel an peek at the articular  surface.  There was a definite step-off.  I used an elevator to lever the depressed fragment flush with the proximal articular surface.  It would not stay reduced without holding it with the elevator.  Then under C-arm guidance I placed a guidepin for the 4.0 mm cannulated screw.  Once I got the guidepin into place it seemed to stabilize the fracture fragment.  I then overdrilled the guidewire and placed the screw with very good compression.  This was acting as a raft to hold up the depressed fragment.  I then placed a cannulated 2.4 millimeter screw from medial to lateral across the distal fragments which also served to raft the depressed fragment.  There was a proximal longitudinal fracture which was crossed with a 3.0 millimeter screw.  This seemed to give us good compression and then putting the knee through range of motion there seem to be no separation anterior only or along the joint surface.  I further stabilized the construct with a fiber tape placed in the patellar tendon in a [place] type suture pattern extending from distal to proximal and a second fiber tape in the quadriceps tendon in a [place] type suture pattern.  These were tied together by crossing the distal and proximal limbs.  Before I tied these I placed some bone graft substitute into the defect was left by the elevation of the depressed fragment.  The crossed fiber tapes served to hold down the cortical window over the defect.  Final fluoroscopic pictures of the right knee were performed.  The knee was irrigated and I closed the medial retinacular incision with #2 FiberWire.  I did a third fiber tape circumferentially around the periphery of the patella and tied this securely.  Tourniquet was deflated.  Minor bleeders were coagulated.    The wound was then closed in layers using 2-0 Vicryl sutures followed by running 3-0 Monocryl suture in subcuticular layer and Dermabond on the skin.  Aquacel dressing was applied.  We then turned our attention  towards the left knee.    Again local anesthetic was injected in the anticipated incision site.  The limb was exsanguinated and the tourniquet inflated to 300 mmHg.  A midline incision was once again made centered on the patella.  Incision was taken down to the extensor mechanism.  On this knee it was difficult to tell even where the fracture line was initially.  So, utilizing the CT scan as a guide I was able to find the main fracture line.  I then confirmed that I was able to get an elevator down through this fracture line.  I then incised the medial retinaculum again to allow myself to palpate the articular surface and to catch a glimpse of the articular surface as well.  Step-off was present and once again I used the elevator to remove remove the depressed fragment into place.  The depressed fragment was most of the inferior pole.  This was deep to the cortex however.  I maneuvered the fragment into place with the help of the C arm.  I then placed 2 guidewires for the 4.0 cannulated screws while holding the fracture in place.  This fracture was actually more difficult to position and placed on the right one was.  I overdrilled the guide pins and then measured to do our best to make sure that the distal aspect of the screws did not extend beyond the far cortex.  Screws were placed with good purchase laterally, and poor purchase medially.  I then through each screw placed the fiber tape cerclage devices.  The sutures were looped through the pretied knots proximally and tension by hand and then with the tensioner device, moving from 1 side to the other and back to confirm even tension.  When I felt that we had adequate tension and we tied the knots to secure the devices.  I then placed the knee through range of motion and there was no movement of the fracture fragments and no crepitations.  Final fluoroscopic pictures were taken of the left knee.    The wound was irrigated and the medial retinacular incision was  closed with #2 FiberWire sutures.  The tourniquet was deflated.  No major bleeders were encountered.  The skin was closed with interrupted 2-0 Vicryl sutures and a running 3-0 Monocryl suture in the subcuticular layer and Dermabond on the skin.  Sterile Aquacel dressing was applied.  Both knees were wrapped in a full-length compressive dressing with extra padding distally to protect from the knee immobilizers.  The knee immobilizers were placed after the adductor blocks were placed.  She was then awakened and transferred the hospital cart to the recovery area in stable condition.    ESTIMATED BLOOD LOSS: 30 cc    Drains: None    SPECIMEN: None    COMPLICATIONS: None    PLAN: Wear the knee immobilizer full-time for 2 weeks.  Weightbearing as tolerated in the immobilizers.  In 2 weeks we will check the wounds and switch her to bilateral T scope braces set at 0-30 when she is at rest but locked in full extension when she ambulates.  She can work on range of motion within the limits of the brace.  Increase the amount of flexion allowed by the brace weekly by about 10 to 15 degrees.  Aspirin 325 mg twice daily for DVT prophylaxis for 4 weeks.      TESFAYE Hernández MD  Dept. Orthopedic Surgery  [facility]

## 2023-04-14 NOTE — PLAN OF CARE
Patient lying on cart, drowsy and out of it, vomited without warning. Small amount of bile emesis on face/chest. Repeated Zofran x1. Oxygen sats previously high 90's, dropped to 88-89%. Placed on 1-2 L, encouraged IS. Sats improving on room air. Did notify Dr. Mcneal, no further orders.

## 2023-04-17 ENCOUNTER — MYC MEDICAL ADVICE (OUTPATIENT)
Dept: FAMILY MEDICINE | Facility: CLINIC | Age: 68
End: 2023-04-17

## 2023-04-17 ENCOUNTER — TELEPHONE (OUTPATIENT)
Dept: ORTHOPEDICS | Facility: CLINIC | Age: 68
End: 2023-04-17
Payer: COMMERCIAL

## 2023-04-17 NOTE — TELEPHONE ENCOUNTER
[facility] Call Center    Phone Message    May a detailed message be left on voicemail: yes     Reason for Call: Other: Hello, Feet are swelling not bad but very noticable and a sore on her back that she would like to get looked at. She would like to talkk to someone on the team.     Action Taken: Other: FZ    Travel Screening: Not Applicable

## 2023-04-17 NOTE — TELEPHONE ENCOUNTER
"RN called and spoke to patient. Patient had writer on speaker phone with  in the background. Patient reports she has her legs almost constantly elevated. Discussed keeping \"toes above nose\" to help with getting fluid moving. Legs are wrapped. Right foot isn't very swollen, left one is more noticeable. Patient's  assessed for pitting at direction of RN and reported no pitting. Discussed swelling is not uncommon after surgery and due to gravity it likes to go to the feet. Went over concerns to watch for like changes in coloration of feet, redness, warmth.   In regards to the sore, discussed reaching out to PCP. Writer educated on repositioning every hour or two using a pillow.    Anupama KOHLER, Specialty RN 4/17/2023 9:17 AM      "

## 2023-04-18 NOTE — PROGRESS NOTES
"SUBJECTIVE:  Kaykay Rockwell is a 67 year old year old female who is here today for follow up after 4/14/23 PROCEDURE: Open reduction and internal fixation bilateral patella fractures.    Other issues: Pt reports pressure sore near her intergluteal cleft. This was evaluated last week by her primary provider.     Pain: mild.   she  Has been compliant with weight-bearing restrictions.   No fevers or chills.     OBJECTIVE:   /72 (BP Location: Right arm, Patient Position: Sitting, Cuff Size: Adult Regular)   Pulse 82   Ht 1.651 m (5' 5\")   Wt 61.7 kg (136 lb)   SpO2 98%   BMI 22.63 kg/m     Patient appears to be alert and in no apparent distress distress  Skin intact.  Wound: serosanguinous drainage from mid distal right wound with a small split in the incision.  No erythema  Left wound healing well.      Neurovascularly Intact.    ROM: not tested.  Tolerates flexion to 30 easily     X-rays today:  None new. .     ASSESSMENT:     ICD-10-CM    1. Other closed fracture of patella with routine healing, unspecified laterality, subsequent encounter  S82.099D [facility] Other (Comments) (bilateral T scope braces set at 0-30 when she is at rest but locked in full extension when she ambulates)     cephALEXin (KEFLEX) 500 MG capsule      2. Postoperative follow-up  Z09 [facility] Other (Comments) (bilateral T scope braces set at 0-30 when she is at rest but locked in full extension when she ambulates)     cephALEXin (KEFLEX) 500 MG capsule      3. Draining postoperative wound, right knee  T81.89XA cephALEXin (KEFLEX) 500 MG capsule         Doing well      PLAN:   Painted right knee wound, redressed  Continue this daily at home with dressing changes  Keflex prescription x 7 days.   Wrap both knees  switch her to bilateral T scope braces set at 0-30 when she is at rest but locked in full extension when she ambulates.  She can work on range of motion within the limits of the brace.  " Increase the amount of flexion allowed by the brace by about 10 to 15 degrees. We want to avoid too much compression of the joint surface with higher flexion.  Aspirin 325 mg twice daily for DVT prophylaxis for 4 weeks total .  Return to clinic in 2 week(s).   Xrays of the knees at that time.    TESFAYE Hernández MD  Dept. Orthopedic Surgery  [facility]

## 2023-04-19 ENCOUNTER — OFFICE VISIT (OUTPATIENT)
Dept: FAMILY MEDICINE | Facility: CLINIC | Age: 68
End: 2023-04-19
Payer: COMMERCIAL

## 2023-04-19 VITALS
HEART RATE: 94 BPM | SYSTOLIC BLOOD PRESSURE: 125 MMHG | TEMPERATURE: 97.3 F | DIASTOLIC BLOOD PRESSURE: 71 MMHG | OXYGEN SATURATION: 100 %

## 2023-04-19 DIAGNOSIS — L89.309 PRESSURE INJURY OF SKIN OF BUTTOCK, UNSPECIFIED INJURY STAGE, UNSPECIFIED LATERALITY: Primary | ICD-10-CM

## 2023-04-19 PROCEDURE — 99213 OFFICE O/P EST LOW 20 MIN: CPT | Performed by: PHYSICIAN ASSISTANT

## 2023-04-19 ASSESSMENT — PAIN SCALES - GENERAL: PAINLEVEL: NO PAIN (0)

## 2023-04-19 NOTE — PROGRESS NOTES
Assessment & Plan     Pressure injury of skin of buttock, unspecified injury stage, unspecified laterality  Continue to use the donut pillow and shift weight in order to allow to heal and prevent further skin changes. She will notify if worsening.   Avoid occlusive dressing, okay to use a gauze or breathable dressing if needed. I don't see that she needs to do this right now.   will continue to monitor.                  Kristen M. Kehr, PA-C [facility]    Sivakumar Weller is a 67 year old, presenting for the following health issues:  Office Visit (Bumps)      Has been sitting non stop since March 31st. Wondering if the bumps are from excessive sitting.   She is unable to look at her skin, but her  has been monitoring. There is no drainage or open sores. She was able to get a donut pillow and shift her weight regularly over the past few days and that has significantly helped.   She broke both of her knees with a fall and had surgery. She is on restriction for at least 4-6 weeks.          View : No data to display.              History of Present Illness       Reason for visit:  Sore spots on rear end  Symptom onset:  3-7 days ago  Symptoms include:  Pain, especially when reclining  Symptom intensity:  Moderate  Symptom progression:  Improving  Had these symptoms before:  No  What makes it worse:  Laying down  What makes it better:  Sitting upright    She eats 2-3 servings of fruits and vegetables daily.She consumes 0 sweetened beverage(s) daily.   She is taking medications regularly.               Review of Systems   Constitutional, HEENT, cardiovascular, pulmonary, GI, , musculoskeletal, neuro, skin, endocrine and psych systems are negative, except as otherwise noted.      Objective    /71   Pulse 94   Temp 97.3  F (36.3  C) (Oral)   SpO2 100%   There is no height or weight on file to calculate BMI.  Physical Exam   GENERAL: healthy, alert and no distress  Sitting  in wheelchair, both lower extremities are dressed, unable to bend knees. She is able to stand and bear weight.   SKIN: there are 2 areas of redness on both buttock, no tissue breakdown or drainage. Local swelling.   PSYCH: mentation appears normal, affect normal/bright

## 2023-04-26 ENCOUNTER — ALLIED HEALTH/NURSE VISIT (OUTPATIENT)
Dept: EDUCATION SERVICES | Facility: CLINIC | Age: 68
End: 2023-04-26
Payer: COMMERCIAL

## 2023-04-26 DIAGNOSIS — E11.9 TYPE 2 DIABETES MELLITUS WITHOUT COMPLICATION, WITHOUT LONG-TERM CURRENT USE OF INSULIN (H): Primary | ICD-10-CM

## 2023-04-26 PROCEDURE — 99207 PR NO CHARGE NURSE ONLY: CPT

## 2023-04-26 PROCEDURE — G0108 DIAB MANAGE TRN  PER INDIV: HCPCS

## 2023-04-26 RX ORDER — INSULIN GLARGINE 100 [IU]/ML
6 INJECTION, SOLUTION SUBCUTANEOUS DAILY
Qty: 15 ML | Refills: 0 | Status: SHIPPED | OUTPATIENT
Start: 2023-04-26 | End: 2023-06-01

## 2023-04-26 RX ORDER — BLOOD-GLUCOSE SENSOR
1 EACH MISCELLANEOUS CONTINUOUS
Qty: 6 EACH | Refills: 11 | Status: SHIPPED | OUTPATIENT
Start: 2023-04-26

## 2023-04-26 NOTE — PATIENT INSTRUCTIONS
"Taking Insulin:    1. Take Basaglar - 6 units at bedtime.     - Rotate injection sites, keeping at least 1 inch apart from last injection site and 2 inches away from belly button or surgical scars.      2. Pen - Use a new pen needle for each injection. Always remove pen needle from the insulin pen after use and do not store insulin pens with the needle on the pen. Do a 2 unit \"prime\" before each injection, be sure a drop or stream of insulin comes out of the needle before you give your injection. After you inject, hold the needle under the skin to the count of 10 to be sure all of the insulin goes in.      3. Store insulin you are not using in the refrigerator (do not freeze). Take new insulin out of the refrigerator a few hours prior to use to bring to room temperature.     4. Once opened Basaglar can be kept at room temperature for 28 days.. Do not use the opened insulin after this time has passed, even if there is still medicine inside.     5. Always carry your blood sugar meter and a sugar source like glucose tablets with you in case of a low blood sugar. Treat a low blood sugar (less than 70) with 15 grams of carbohydrate (1 carb choice). Wait 15 minutes, recheck blood sugar. If blood sugar is still below 70, repeat the treatment.    7. Call your doctor or diabetes educator if you begin having low blood sugars or if you have questions or concerns.     8.Freestyle Pilar 3,  I contacted [employer] [phone], mail order and you will 3 months worth     9. Protein shakes, either Fairlife or Premier or Equate.      Protein with all meals , and snacks    Pilar 2 & 3 Instructions:     1.  The first hour after you place the sensor is the warm up period (black out period).  You will need to carry your blood glucose meter and check blood glucose during this time.     2.  Check blood sugar if feeling symptoms of hypoglycemia but meter is not displaying a low number- may be some variability between sensor " and meter at times.     3.  Change sensor every 14 days.           5.  Watch trend arrows- will let you know if blood sugars are rising, falling or stable at the time you check.     6.  Pilar 2 & 3 have alarms. You can adjust both the high and low blood glucose alarm (when they will go off) or turn off high blood glucose alarm if alarming too much.     You cannot turn off the critical low blood glucose alarm     7.  You can shower, bathe, and swim with sensor on. Do not get reader wet.     8.  Remove the sensor if you need to have an MRI or CT scan.     9.  Do not cover the sensor with extra adhesive (the small hole in the center of the sensor must remain uncovered), unless it is made for the Pilar.  If you have trouble with sensor falling off, these are approved products to help with sensor adhesion: Torbot Skin Tac, SKIN-PREP Protective Barrier Wipe and Mastisol Liquid Adhesive     10.  Check the dose if you take a multivitamin or Vitamin C (ascorbic acid). You want to limit daily intake of ascorbic acid to 500 mg/day or less, or it may falsely raise sensor glucose readings. This is more of a concern if you are on insulin or medication that can cause low blood glucose as you may miss a severe low glucose event.     Support:   If you have any trouble with use or if the sensor falls off early, call the customer service number for Pilar located on the sensor's box. They can often help troubleshoot or replace sensor if needed.     [employer] Customer Service: [phone]     Discount Programs are available through [employer]:     [employer] Program ([email or web address])   To save on sensors, if told cost is more than $75: call [employer] Voucher line at [phone]        Our Goal for average BG is 100-110 for healing of your wounds        [facility] for the [facility]:  For Your Diabetes Education or Nutrition Appointments Call:  [phone]    For Diabetes or Nutrition Related Questions:   Phone: [phone]  Send MyChart Message   If you need a medication refill please contact your pharmacy. Please allow 3 business days for your refills to be completed.    For Your Diabetes Education or Nutrition Appointments Call:  [phone]   For Diabetes or Nutrition Related Questions:   Phone: [phone]  Send MyChart Message   If you need a medication refill please contact your pharmacy. Please allow 3 business days for your refills to be completed.       Bring blood glucose meter and logbook with you to all doctor and follow-up appointments.    Diabetes Education Telephone Visit Follow-up:    We realize your time is valuable and your health is important! We offer a convenient Telephone Visit follow up! It s a quick way to check in for a medication dose adjustment without having to come back to clinic as soon.    Telephone Visits are often covered by insurance. Please check with your insurance plan to see if this type of visit is covered. If not, the cost is less expensive than an office visit:    Up to 10 minutes (Code 17813): $30  11-20 minutes (Code 99543): $59  More than 20 minutes (Code 86734): $85    Talk with your Diabetes Educator if you want to learn more.      [facility]:  For Your Diabetes Education and Nutrition Appointments Call:  [phone]   For Diabetes Education or Nutrition Related Questions:   Phone: [phone]  Send MyChart Message   If you need a medication refill please contact your pharmacy. Please allow 3 business days for your refills to be completed.

## 2023-04-26 NOTE — LETTER
4/26/2023         RE: Kaykay Rockwell  [address]  [place] [address]        Dear Colleague,    Thank you for referring your patient, Kaykay Rockwell, to the [facility]. Please see a copy of my visit note below.    Diabetes Self-Management Education & Support    Presents for: Follow-up    Type of Service: In Person Visit    Assessment Type:   ASSESSMENT:  Janee is here today for some follow-up on her diabetes she is in a wheelchair today a few weeks ago she fell and broke her kneecaps and she is now wheelchair-bound nonweightbearing she has been doing some exercises in her wheelchair but really cannot exercise like she did before her blood sugars are going up she has some steroids placed for her shoulder blood sugars jerman and for healing and wound care of her surgeries on her legs we have opted to put her on insulin to get those blood sugars under great control and so we are monitoring her with a CGM and I have ordered for her to get 1 as some we need to keep a tight ring on those blood sugars so she does not have any lows she was agreeable to this I taught her how to give insulin her  was here we were able to get her a voucher for the insulin to help out and she was very appreciative I did ask her to drink more proteins for wound healing as well and she agreed to do that    Patient's most recent   Lab Results   Component Value Date    A1C 9.0 04/12/2023     is not meeting goal of <7.0    Diabetes knowledge and skills assessment:   Patient is knowledgeable in diabetes management concepts related to: Being Active, Monitoring and Healthy Coping    Continue education with the following diabetes management concepts: Healthy Eating, Monitoring, Taking Medication and Problem Solving    Based on learning assessment above, most appropriate setting for further diabetes education would be: Individual setting.      PLAN    Taking Insulin:    1. Take Basaglar - 6 units at bedtime.  "    - Rotate injection sites, keeping at least 1 inch apart from last injection site and 2 inches away from belly button or surgical scars.      2. Pen - Use a new pen needle for each injection. Always remove pen needle from the insulin pen after use and do not store insulin pens with the needle on the pen. Do a 2 unit \"prime\" before each injection, be sure a drop or stream of insulin comes out of the needle before you give your injection. After you inject, hold the needle under the skin to the count of 10 to be sure all of the insulin goes in.      3. Store insulin you are not using in the refrigerator (do not freeze). Take new insulin out of the refrigerator a few hours prior to use to bring to room temperature.     4. Once opened Basaglar can be kept at room temperature for 28 days.. Do not use the opened insulin after this time has passed, even if there is still medicine inside.     5. Always carry your blood sugar meter and a sugar source like glucose tablets with you in case of a low blood sugar. Treat a low blood sugar (less than 70) with 15 grams of carbohydrate (1 carb choice). Wait 15 minutes, recheck blood sugar. If blood sugar is still below 70, repeat the treatment.    7. Call your doctor or diabetes educator if you begin having low blood sugars or if you have questions or concerns.     8.Storefr[employer] 3,  I contacted [employer] [phone], mail order and you will 3 months worth     9. Protein shakes, either Fairlife or Premier or Equate.      Protein with all meals , and snacks    Topics to cover at upcoming visits: Healthy Eating, Monitoring, Taking Medication and Problem Solving    Follow-up:     See Care Plan for co-developed, patient-state behavior change goals.  AVS provided for patient today.    Education Materials Provided:  My Plate Planner      SUBJECTIVE/OBJECTIVE:  Presents for: Follow-up  Accompanied by: Self  Diabetes education in the past 24mo: No  Focus of Visit: " "Diabetes Pathophysiology, Taking Medication, Healthy Eating, Monitoring  Diabetes type: Type 2  Date of diagnosis: 1/2022  How confident are you filling out medical forms by yourself:: Quite a bit  Diabetes management related comments/concerns: Fell , broke knee caps and needed surgery  Transportation concerns: No  Difficulty affording diabetes medication?: No  Difficulty affording diabetes testing supplies?: No  Other concerns:: None  Cultural Influences/Ethnic Background:  Not  or       Diabetes Symptoms & Complications:  Fatigue: Sometimes  Neuropathy: Sometimes  Polydipsia: No  Polyphagia: No  Polyuria: No  Visual change: No  Slow healing wounds: No  Symptom course: Improving  Weight trend: Stable       Patient Problem List and Family Medical History reviewed for relevant medical history, current medical status, and diabetes risk factors.    Vitals:  There were no vitals taken for this visit.  Estimated body mass index is 22.63 kg/m  as calculated from the following:    Height as of 4/14/23: 1.651 m (5' 5\").    Weight as of 4/14/23: 61.7 kg (136 lb).   Last 3 BP:   BP Readings from Last 3 Encounters:   04/19/23 125/71   04/14/23 (!) 149/82   04/12/23 126/79       History   Smoking Status     Former     Packs/day: 0.50     Years: 39.00     Types: Cigarettes   Smokeless Tobacco     Never       Labs:  Lab Results   Component Value Date    A1C 9.0 04/12/2023     Lab Results   Component Value Date     04/14/2023     04/12/2023     Lab Results   Component Value Date    LDL 89 01/11/2023     Direct Measure HDL   Date Value Ref Range Status   01/11/2023 58 >=50 mg/dL Final   ]  GFR Estimate   Date Value Ref Range Status   04/12/2023 80 >60 mL/min/1.73m2 Final     Comment:     eGFR calculated using 2021 CKD-EPI equation.     No results found for: GFRESTBLACK  Lab Results   Component Value Date    CR 0.80 04/12/2023     No results found for: MICROALBUMIN    Healthy Eating:  Healthy Eating " Assessed Today: Yes  Meal planning/habits: Avoiding sweets, Heart healthy, Low salt, Plate planning method  How many times a week on average do you eat food made away from home (restaurant/take-out)?: 2  Meals include: Breakfast, Lunch, Dinner, Morning Snack, Afternoon Snack, Evening Snack  Breakfast: cheerios, berries and V-8  Lunch: salad and lazagna, and milk or crm of mushroom soup , PB and J , carrot sticks  Dinner: braut taylor and bun, salad, crm of mushroom soup and milk  Snacks: handful of nuts, or apple slices and cheese or gold fish or banana  Beverages: Water, Tea, Coffee, Diet soda  Has patient met with a dietitian in the past?: No    Being Active:  Being Active Assessed Today: Yes  Exercise:: Yes  Days per week of moderate to strenuous exercise (like a brisk walk): 7  On average, minutes per day of exercise at this level: 30  How intense was your typical exercise? : Light (like stretching or slow walking)  Exercise Minutes per Week: 210  Barrier to exercise: None, Physical limitation    Monitoring:  Monitoring Assessed Today: Yes  Did patient bring glucose meter to appointment? : Yes  Blood Glucose Meter: Accu-chek  Times checking blood sugar at home (number): 2  Times checking blood sugar at home (per): Day  Blood glucose trend: Fluctuating                    Taking Medications:  Diabetes Medication(s)     Biguanides       metFORMIN (GLUCOPHAGE XR) 500 MG 24 hr tablet    Take 2 tablets (1,000 mg) by mouth 2 times daily (with meals)    Insulin       insulin glargine (BASAGLAR KWIKPEN) 100 UNIT/ML pen    Inject 6 Units Subcutaneous daily RX BIN [id number]   [facility]    GROUP  FVBARREPFR       ID  [id number]  EXP 12/31/2023          Taking Medication Assessed Today: Yes  Current Treatments: Oral Medication (taken by mouth)  Problems taking diabetes medications regularly?: No  Diabetes medication side effects?: No    Problem Solving:                 Reducing Risks:  Diabetes Risks: Age over 45 years, Family  History  CAD Risks: Diabetes Mellitus, Family history, Hypertension  Has dilated eye exam at least once a year?: No  Sees dentist every 6 months?: Yes  Feet checked by healthcare provider in the last year?: No    Healthy Coping:  Healthy Coping Assessed Today: Yes  Emotional response to diabetes: Concern for health and well-being, Ready to learn, Acceptance  Informal Support system:: Children, Family, Friends, Spouse  Stage of change: ACTION (Actively working towards change)  Patient Activation Measure Survey Score:      4/6/2023     1:00 PM   JULIAN Score (Last Two)   JULIAN Raw Score 40   Activation Score 100   JULIAN Level 4         Care Plan and Education Provided:  Care Plan: Diabetes   Updates made by Ellen Min RN since 4/27/2023 12:00 AM      Problem: HbA1C Not In Goal       Goal: Establish Regular Follow-Ups with PCP    Start Date: 4/27/2023   This Visit's Progress: 100%   Recent Progress: 80%      Goal: Get HbA1C Level in Goal    Start Date: 4/27/2023   This Visit's Progress: 50%   Recent Progress: 10%      Problem: Diabetes Self-Management Education Needed to Optimize Self-Care Behaviors       Goal: Understand diabetes pathophysiology and disease progression    Start Date: 4/27/2023   Recent Progress: 80%      Goal: Healthy Eating - follow a healthy eating pattern for diabetes    Start Date: 4/27/2023   Recent Progress: 70%      Goal: Being Active - get regular physical activity, working up to at least 150 minutes per week    Start Date: 4/27/2023   This Visit's Progress: 50%   Recent Progress: 100%      Goal: Monitoring - monitor glucose and ketones as directed    Start Date: 4/27/2023   Recent Progress: 90%      Task: Provide education on continuous glucose monitoring (sensor placement, use of jesus or /reader, understanding glucose trends, alerts and alarms, differences between sensor glucose and blood glucose) Completed 4/27/2023   Responsible User: Ellen Min, RN      Goal: Taking Medication -  patient is consistently taking medications as directed    Start Date: 4/27/2023   Recent Progress: 50%      Goal: Problem Solving - know how to prevent and manage short-term diabetes complications    Start Date: 4/27/2023   Recent Progress: 50%      Goal: Healthy Coping - use available resources to cope with the challenges of managing diabetes    Start Date: 4/27/2023   Recent Progress: 100%          Rahel Min RN/DARNELL Martini Diabetes Educator      Time Spent: 60 minutes  Encounter Type: Individual    Any diabetes medication dose changes were made via the CDE Protocol per the patient's primary care provider. A copy of this encounter was shared with the provider.

## 2023-04-27 ENCOUNTER — DOCUMENTATION ONLY (OUTPATIENT)
Dept: OTHER | Facility: CLINIC | Age: 68
End: 2023-04-27

## 2023-04-27 ENCOUNTER — OFFICE VISIT (OUTPATIENT)
Dept: ORTHOPEDICS | Facility: CLINIC | Age: 68
End: 2023-04-27
Payer: COMMERCIAL

## 2023-04-27 VITALS
DIASTOLIC BLOOD PRESSURE: 72 MMHG | HEIGHT: 65 IN | WEIGHT: 136 LBS | BODY MASS INDEX: 22.66 KG/M2 | SYSTOLIC BLOOD PRESSURE: 112 MMHG | HEART RATE: 82 BPM | OXYGEN SATURATION: 98 %

## 2023-04-27 DIAGNOSIS — S82.099D: Primary | ICD-10-CM

## 2023-04-27 DIAGNOSIS — Z09 POSTOPERATIVE FOLLOW-UP: ICD-10-CM

## 2023-04-27 DIAGNOSIS — T81.89XA DRAINING POSTOPERATIVE WOUND, INITIAL ENCOUNTER: ICD-10-CM

## 2023-04-27 PROCEDURE — 99024 POSTOP FOLLOW-UP VISIT: CPT | Performed by: ORTHOPAEDIC SURGERY

## 2023-04-27 RX ORDER — CEPHALEXIN 500 MG/1
500 CAPSULE ORAL 3 TIMES DAILY
Qty: 21 CAPSULE | Refills: 0 | Status: SHIPPED | OUTPATIENT
Start: 2023-04-27 | End: 2023-04-27

## 2023-04-27 RX ORDER — CEPHALEXIN 500 MG/1
500 CAPSULE ORAL 3 TIMES DAILY
Qty: 42 CAPSULE | Refills: 0 | Status: SHIPPED | OUTPATIENT
Start: 2023-04-27 | End: 2023-06-01

## 2023-04-27 ASSESSMENT — PAIN SCALES - GENERAL: PAINLEVEL: NO PAIN (0)

## 2023-04-27 NOTE — LETTER
"    4/27/2023         RE: Kayaky Rockwell  [address]  [place] [address]        Dear Colleague,    Thank you for referring your patient, Kaykay Rockwell, to the [facility]. Please see a copy of my visit note below.    SUBJECTIVE:  Kaykay Rockwell is a 67 year old year old female who is here today for follow up after 4/14/23 PROCEDURE: Open reduction and internal fixation bilateral patella fractures.    Other issues: Pt reports pressure sore near her intergluteal cleft. This was evaluated last week by her primary provider.     Pain: mild.   she  Has been compliant with weight-bearing restrictions.   No fevers or chills.     OBJECTIVE:   /72 (BP Location: Right arm, Patient Position: Sitting, Cuff Size: Adult Regular)   Pulse 82   Ht 1.651 m (5' 5\")   Wt 61.7 kg (136 lb)   SpO2 98%   BMI 22.63 kg/m     Patient appears to be alert and in no apparent distress distress  Skin intact.  Wound: serosanguinous drainage from mid distal right wound with a small split in the incision.  No erythema  Left wound healing well.      Neurovascularly Intact.    ROM: not tested.  Tolerates flexion to 30 easily     X-rays today:  None new. .     ASSESSMENT:     ICD-10-CM    1. Other closed fracture of patella with routine healing, unspecified laterality, subsequent encounter  S82.099D [facility] Other (Comments) (bilateral T scope braces set at 0-30 when she is at rest but locked in full extension when she ambulates)     cephALEXin (KEFLEX) 500 MG capsule      2. Postoperative follow-up  Z09 [facility] Other (Comments) (bilateral T scope braces set at 0-30 when she is at rest but locked in full extension when she ambulates)     cephALEXin (KEFLEX) 500 MG capsule      3. Draining postoperative wound, right knee  T81.89XA cephALEXin (KEFLEX) 500 MG capsule         Doing well      PLAN:   Painted right knee wound, redressed  Continue this daily at " home with dressing changes  Keflex prescription x 7 days.   Wrap both knees  switch her to bilateral T scope braces set at 0-30 when she is at rest but locked in full extension when she ambulates.  She can work on range of motion within the limits of the brace.  Increase the amount of flexion allowed by the brace by about 10 to 15 degrees. We want to avoid too much compression of the joint surface with higher flexion.  Aspirin 325 mg twice daily for DVT prophylaxis for 4 weeks total .  Return to clinic in 2 week(s).   Xrays of the knees at that time.    TESFAYE Hernández MD  Dept. Orthopedic Surgery  [facility]        Again, thank you for allowing me to participate in the care of your patient.        Sincerely,        Noman Hernández MD

## 2023-04-27 NOTE — PROGRESS NOTES
Diabetes Self-Management Education & Support    Presents for: Follow-up    Type of Service: In Person Visit    Assessment Type:   ASSESSMENT:  Janee is here today for some follow-up on her diabetes she is in a wheelchair today a few weeks ago she fell and broke her kneecaps and she is now wheelchair-bound nonweightbearing she has been doing some exercises in her wheelchair but really cannot exercise like she did before her blood sugars are going up she has some steroids placed for her shoulder blood sugars jerman and for healing and wound care of her surgeries on her legs we have opted to put her on insulin to get those blood sugars under great control and so we are monitoring her with a CGM and I have ordered for her to get 1 as some we need to keep a tight ring on those blood sugars so she does not have any lows she was agreeable to this I taught her how to give insulin her  was here we were able to get her a voucher for the insulin to help out and she was very appreciative I did ask her to drink more proteins for wound healing as well and she agreed to do that    Patient's most recent   Lab Results   Component Value Date    A1C 9.0 04/12/2023     is not meeting goal of <7.0    Diabetes knowledge and skills assessment:   Patient is knowledgeable in diabetes management concepts related to: Being Active, Monitoring and Healthy Coping    Continue education with the following diabetes management concepts: Healthy Eating, Monitoring, Taking Medication and Problem Solving    Based on learning assessment above, most appropriate setting for further diabetes education would be: Individual setting.      PLAN    Taking Insulin:    1. Take Basaglar - 6 units at bedtime.     - Rotate injection sites, keeping at least 1 inch apart from last injection site and 2 inches away from belly button or surgical scars.      2. Pen - Use a new pen needle for each injection. Always remove pen needle from the insulin pen after use and  "do not store insulin pens with the needle on the pen. Do a 2 unit \"prime\" before each injection, be sure a drop or stream of insulin comes out of the needle before you give your injection. After you inject, hold the needle under the skin to the count of 10 to be sure all of the insulin goes in.      3. Store insulin you are not using in the refrigerator (do not freeze). Take new insulin out of the refrigerator a few hours prior to use to bring to room temperature.     4. Once opened Basaglar can be kept at room temperature for 28 days.. Do not use the opened insulin after this time has passed, even if there is still medicine inside.     5. Always carry your blood sugar meter and a sugar source like glucose tablets with you in case of a low blood sugar. Treat a low blood sugar (less than 70) with 15 grams of carbohydrate (1 carb choice). Wait 15 minutes, recheck blood sugar. If blood sugar is still below 70, repeat the treatment.    7. Call your doctor or diabetes educator if you begin having low blood sugars or if you have questions or concerns.     8.[employer] 3,  I contacted [employer] [phone], mail order and you will 3 months worth     9. Protein shakes, either Fairlife or Premier or Equate.      Protein with all meals , and snacks    Topics to cover at upcoming visits: Healthy Eating, Monitoring, Taking Medication and Problem Solving    Follow-up:     See Care Plan for co-developed, patient-state behavior change goals.  AVS provided for patient today.    Education Materials Provided:  My Plate Planner      SUBJECTIVE/OBJECTIVE:  Presents for: Follow-up  Accompanied by: Self  Diabetes education in the past 24mo: No  Focus of Visit: Diabetes Pathophysiology, Taking Medication, Healthy Eating, Monitoring  Diabetes type: Type 2  Date of diagnosis: 1/2022  How confident are you filling out medical forms by yourself:: Quite a bit  Diabetes management related comments/concerns: erica Alston " "knee caps and needed surgery  Transportation concerns: No  Difficulty affording diabetes medication?: No  Difficulty affording diabetes testing supplies?: No  Other concerns:: None  Cultural Influences/Ethnic Background:  Not  or       Diabetes Symptoms & Complications:  Fatigue: Sometimes  Neuropathy: Sometimes  Polydipsia: No  Polyphagia: No  Polyuria: No  Visual change: No  Slow healing wounds: No  Symptom course: Improving  Weight trend: Stable       Patient Problem List and Family Medical History reviewed for relevant medical history, current medical status, and diabetes risk factors.    Vitals:  There were no vitals taken for this visit.  Estimated body mass index is 22.63 kg/m  as calculated from the following:    Height as of 4/14/23: 1.651 m (5' 5\").    Weight as of 4/14/23: 61.7 kg (136 lb).   Last 3 BP:   BP Readings from Last 3 Encounters:   04/19/23 125/71   04/14/23 (!) 149/82   04/12/23 126/79       History   Smoking Status     Former     Packs/day: 0.50     Years: 39.00     Types: Cigarettes   Smokeless Tobacco     Never       Labs:  Lab Results   Component Value Date    A1C 9.0 04/12/2023     Lab Results   Component Value Date     04/14/2023     04/12/2023     Lab Results   Component Value Date    LDL 89 01/11/2023     Direct Measure HDL   Date Value Ref Range Status   01/11/2023 58 >=50 mg/dL Final   ]  GFR Estimate   Date Value Ref Range Status   04/12/2023 80 >60 mL/min/1.73m2 Final     Comment:     eGFR calculated using 2021 CKD-EPI equation.     No results found for: GFRESTBLACK  Lab Results   Component Value Date    CR 0.80 04/12/2023     No results found for: MICROALBUMIN    Healthy Eating:  Healthy Eating Assessed Today: Yes  Meal planning/habits: Avoiding sweets, Heart healthy, Low salt, Plate planning method  How many times a week on average do you eat food made away from home (restaurant/take-out)?: 2  Meals include: Breakfast, Lunch, Dinner, Morning Snack, " Afternoon Snack, Evening Snack  Breakfast: cheerios, berries and V-8  Lunch: salad and lazagna, and milk or crm of mushroom soup , PB and J , carrot sticks  Dinner: braut taylor and bun, salad, crm of mushroom soup and milk  Snacks: handful of nuts, or apple slices and cheese or gold fish or banana  Beverages: Water, Tea, Coffee, Diet soda  Has patient met with a dietitian in the past?: No    Being Active:  Being Active Assessed Today: Yes  Exercise:: Yes  Days per week of moderate to strenuous exercise (like a brisk walk): 7  On average, minutes per day of exercise at this level: 30  How intense was your typical exercise? : Light (like stretching or slow walking)  Exercise Minutes per Week: 210  Barrier to exercise: None, Physical limitation    Monitoring:  Monitoring Assessed Today: Yes  Did patient bring glucose meter to appointment? : Yes  Blood Glucose Meter: Accu-chek  Times checking blood sugar at home (number): 2  Times checking blood sugar at home (per): Day  Blood glucose trend: Fluctuating                    Taking Medications:  Diabetes Medication(s)     Biguanides       metFORMIN (GLUCOPHAGE XR) 500 MG 24 hr tablet    Take 2 tablets (1,000 mg) by mouth 2 times daily (with meals)    Insulin       insulin glargine (BASAGLAR KWIKPEN) 100 UNIT/ML pen    Inject 6 Units Subcutaneous daily RX BIN [id number]   [facility]    GROUP  FVBARREPFR       ID  [id number]  EXP 12/31/2023          Taking Medication Assessed Today: Yes  Current Treatments: Oral Medication (taken by mouth)  Problems taking diabetes medications regularly?: No  Diabetes medication side effects?: No    Problem Solving:                 Reducing Risks:  Diabetes Risks: Age over 45 years, Family History  CAD Risks: Diabetes Mellitus, Family history, Hypertension  Has dilated eye exam at least once a year?: No  Sees dentist every 6 months?: Yes  Feet checked by healthcare provider in the last year?: No    Healthy Coping:  Healthy Coping Assessed Today:  Yes  Emotional response to diabetes: Concern for health and well-being, Ready to learn, Acceptance  Informal Support system:: Children, Family, Friends, Spouse  Stage of change: ACTION (Actively working towards change)  Patient Activation Measure Survey Score:      4/6/2023     1:00 PM   JULIAN Score (Last Two)   JULIAN Raw Score 40   Activation Score 100   JULIAN Level 4         Care Plan and Education Provided:  Care Plan: Diabetes   Updates made by Ellen Min RN since 4/27/2023 12:00 AM      Problem: HbA1C Not In Goal       Goal: Establish Regular Follow-Ups with PCP    Start Date: 4/27/2023   This Visit's Progress: 100%   Recent Progress: 80%      Goal: Get HbA1C Level in Goal    Start Date: 4/27/2023   This Visit's Progress: 50%   Recent Progress: 10%      Problem: Diabetes Self-Management Education Needed to Optimize Self-Care Behaviors       Goal: Understand diabetes pathophysiology and disease progression    Start Date: 4/27/2023   Recent Progress: 80%      Goal: Healthy Eating - follow a healthy eating pattern for diabetes    Start Date: 4/27/2023   Recent Progress: 70%      Goal: Being Active - get regular physical activity, working up to at least 150 minutes per week    Start Date: 4/27/2023   This Visit's Progress: 50%   Recent Progress: 100%      Goal: Monitoring - monitor glucose and ketones as directed    Start Date: 4/27/2023   Recent Progress: 90%      Task: Provide education on continuous glucose monitoring (sensor placement, use of jesus or /reader, understanding glucose trends, alerts and alarms, differences between sensor glucose and blood glucose) Completed 4/27/2023   Responsible User: Ellen Min RN      Goal: Taking Medication - patient is consistently taking medications as directed    Start Date: 4/27/2023   Recent Progress: 50%      Goal: Problem Solving - know how to prevent and manage short-term diabetes complications    Start Date: 4/27/2023   Recent Progress: 50%      Goal:  Healthy Coping - use available resources to cope with the challenges of managing diabetes    Start Date: 4/27/2023   Recent Progress: 100%          Rahel Min RN/DARNELL Martini Diabetes Educator      Time Spent: 60 minutes  Encounter Type: Individual    Any diabetes medication dose changes were made via the CDE Protocol per the patient's primary care provider. A copy of this encounter was shared with the provider.

## 2023-05-08 ENCOUNTER — OFFICE VISIT (OUTPATIENT)
Dept: FAMILY MEDICINE | Facility: CLINIC | Age: 68
End: 2023-05-08
Payer: COMMERCIAL

## 2023-05-08 VITALS
HEART RATE: 99 BPM | DIASTOLIC BLOOD PRESSURE: 75 MMHG | SYSTOLIC BLOOD PRESSURE: 112 MMHG | RESPIRATION RATE: 14 BRPM | OXYGEN SATURATION: 99 % | TEMPERATURE: 97.8 F

## 2023-05-08 DIAGNOSIS — Z12.11 SCREEN FOR COLON CANCER: ICD-10-CM

## 2023-05-08 DIAGNOSIS — E11.65 TYPE 2 DIABETES MELLITUS WITH HYPERGLYCEMIA, UNSPECIFIED WHETHER LONG TERM INSULIN USE (H): ICD-10-CM

## 2023-05-08 PROCEDURE — 99212 OFFICE O/P EST SF 10 MIN: CPT | Performed by: PHYSICIAN ASSISTANT

## 2023-05-08 ASSESSMENT — PAIN SCALES - GENERAL: PAINLEVEL: NO PAIN (0)

## 2023-05-08 NOTE — Clinical Note
Here is Janee's information to reach out about her monitor and talk about her blood sugars. Thank you.  Kristen Kehr PA-C

## 2023-05-08 NOTE — PROGRESS NOTES
Assessment & Plan     Diabetes mellitus, type 2 (H)  She is currently using insulin. Blood sugars are looking better.   She is working with Diabetes Education also for monitoring and any adjustments.   A1C was 9.0 in April. It is too soon to check this again, but that was prior to insulin. Blood sugars on the CGM are much better.   I will plan to see her back in 3 months      Previous skin issue / pressure sores have improved and no longer an issue    She needed a letter for the DMV also about her drivers license. Letter was given today. See communications.            Kristen M. Kehr, PA-C [facility]    Sivakumar Weller is a 67 year old, presenting for the following health issues:  RECHECK        4/19/2023     1:19 PM   Additional Questions   Roomed by Marie   Accompanied by - Bill     History of Present Illness       Diabetes:   She presents for follow up of diabetes.  She is checking home blood glucose with a continuous glucose monitor.  She checks blood glucose before and after meals.  Blood glucose is sometimes over 200 and never under 70. When her blood glucose is low, the patient is asymptomatic for confusion, blurred vision, lethargy and reports not feeling dizzy, shaky, or weak.  She is concerned about blood sugar frequently over 200. She is having numbness in feet. The patient has not had a diabetic eye exam in the last 12 months.         Hypertension: She presents for follow up of hypertension.  She does not check blood pressure  regularly outside of the clinic. Outpatient blood pressures have not been over 140/90. She follows a low salt diet.     She eats 2-3 servings of fruits and vegetables daily.She consumes 0 sweetened beverage(s) daily.She exercises with enough effort to increase her heart rate 9 or less minutes per day.  She exercises with enough effort to increase her heart rate 3 or less days per week.   She is taking medications regularly.               Review  of Systems   Constitutional, HEENT, cardiovascular, pulmonary, GI, , musculoskeletal, neuro, skin, endocrine and psych systems are negative, except as otherwise noted.      Objective    /75   Pulse 99   Temp 97.8  F (36.6  C) (Tympanic)   Resp 14   SpO2 99%   Breastfeeding No   There is no height or weight on file to calculate BMI.  Physical Exam   GENERAL: healthy, alert and no distress  She is in a wheelchair, legs in braces.   PSYCH: mentation appears normal, affect normal/bright    Hospital Outpatient Visit on 04/14/2023   Component Date Value Ref Range Status     GLUCOSE BY METER POCT 04/14/2023 236 (H)  70 - 99 mg/dL Final     GLUCOSE BY METER POCT 04/14/2023 217 (H)  70 - 99 mg/dL Final

## 2023-05-08 NOTE — LETTER
May 8, 2023              RE: Kaykay Rockwell    1955          To Whom It May Concern:      Kaykay is a patient at [facility] and recently injured her lower extremities and temporarily unable to drive.     When applying for a handicap parking permit, she accidentally checked that she is unable to drive, therefore her driving privileges were revoked.     I anticipate that she will be able to drive once again by 2023. Since this disability is not permanent, please reinstate her driving license.       If you have any questions or concerns, please contact the clinic at [phone].    Thank you,        Kristen Kehr PA-C

## 2023-05-09 ENCOUNTER — MYC MEDICAL ADVICE (OUTPATIENT)
Dept: EDUCATION SERVICES | Facility: CLINIC | Age: 68
End: 2023-05-09
Payer: COMMERCIAL

## 2023-05-09 ASSESSMENT — KOOS JR: KOOS JR SCORING: 100

## 2023-05-10 NOTE — PROGRESS NOTES
SUBJECTIVE:   Kaykay Rockwell is a 67 year old year old female who is here today for follow up after 4/14/23 PROCEDURE: Open reduction and internal fixation bilateral patella fractures.    Present symptoms: no pain. +constipation     Review of Systems:  Constitutional/General: Negative for fever, chills, change in weight  Integumentary/Skin: Negative for worrisome rashes, moles, or lesions  Neuro: Negative for weakness, dizziness, or paresthesias   Psychiatric: negative for changes in mood or affect    OBJECTIVE:  Physical Exam:  /59 (BP Location: Left arm, Patient Position: Sitting, Cuff Size: Adult Regular)   Pulse 101   SpO2 97%   General Appearance: healthy, alert and no distress   Skin: no suspicious lesions or rashes  Neuro: Normal strength and tone, mentation intact and speech normal  Vascular: good pulses, and capillary refill   Lymph: no lymphadenopathy   Psych:  mentation appears normal and affect normal/bright  Resp: no increased work of breathing    Bilateral Lower Extremity Exam:  Inspection: wounds healing well. Right draining area appears dry, with eschar over it.  Effusion: minimal   ROM: easily tolerates 0-75. No crepitations   Tender: non-tender    Strength: able to do SLR's easily      X-rays:  Obtained today of bilateral knees: 3-views, reviewed in the office with the patient by myself today and show about a 1mm stepoff of the right articular surface on the lateral view, left shows a 2mm stepoff on the lateral view, so both articular surfaces have shifted a bit.      ASSESSMENT:   Doing well status post open reduction and internal fixation comminuted intra-articular fractures.    PLAN:   Open hinges on braces 0-50. Increase by 10 each week as pain allows.  Work on heel slides. NO open chain range of motion or strengthening   Range of motion should be painfree.  SLR's for strengthening  Scar massage with vitamin E discussed  physical therapy ordered.  Refilled Senna    Return to clinic: 3  renetta.     TESFAYE Hernández MD  Dept. Orthopedic Surgery  [facility]

## 2023-05-11 ENCOUNTER — ANCILLARY PROCEDURE (OUTPATIENT)
Dept: GENERAL RADIOLOGY | Facility: CLINIC | Age: 68
End: 2023-05-11
Attending: ORTHOPAEDIC SURGERY
Payer: COMMERCIAL

## 2023-05-11 ENCOUNTER — OFFICE VISIT (OUTPATIENT)
Dept: ORTHOPEDICS | Facility: CLINIC | Age: 68
End: 2023-05-11
Payer: COMMERCIAL

## 2023-05-11 VITALS — SYSTOLIC BLOOD PRESSURE: 100 MMHG | OXYGEN SATURATION: 97 % | HEART RATE: 101 BPM | DIASTOLIC BLOOD PRESSURE: 59 MMHG

## 2023-05-11 DIAGNOSIS — S82.099D: ICD-10-CM

## 2023-05-11 DIAGNOSIS — Z09 POSTOPERATIVE FOLLOW-UP: ICD-10-CM

## 2023-05-11 DIAGNOSIS — K59.00 CONSTIPATION, UNSPECIFIED CONSTIPATION TYPE: ICD-10-CM

## 2023-05-11 DIAGNOSIS — S82.099D: Primary | ICD-10-CM

## 2023-05-11 PROCEDURE — 73562 X-RAY EXAM OF KNEE 3: CPT | Mod: TC | Performed by: STUDENT IN AN ORGANIZED HEALTH CARE EDUCATION/TRAINING PROGRAM

## 2023-05-11 PROCEDURE — 99024 POSTOP FOLLOW-UP VISIT: CPT | Performed by: ORTHOPAEDIC SURGERY

## 2023-05-11 RX ORDER — SENNA AND DOCUSATE SODIUM 50; 8.6 MG/1; MG/1
1 TABLET, FILM COATED ORAL AT BEDTIME
Qty: 40 TABLET | Refills: 1 | Status: SHIPPED | OUTPATIENT
Start: 2023-05-11 | End: 2023-06-01

## 2023-05-11 ASSESSMENT — PAIN SCALES - GENERAL: PAINLEVEL: MODERATE PAIN (4)

## 2023-05-11 NOTE — LETTER
5/11/2023         RE: Kaykay Rockwell  [address]  [place] [address]        Dear Colleague,    Thank you for referring your patient, Kaykay Rockwell, to the [facility]. Please see a copy of my visit note below.    SUBJECTIVE:   Kaykay Rockwell is a 67 year old year old female who is here today for follow up after 4/14/23 PROCEDURE: Open reduction and internal fixation bilateral patella fractures.    Present symptoms: no pain. +constipation     Review of Systems:  Constitutional/General: Negative for fever, chills, change in weight  Integumentary/Skin: Negative for worrisome rashes, moles, or lesions  Neuro: Negative for weakness, dizziness, or paresthesias   Psychiatric: negative for changes in mood or affect    OBJECTIVE:  Physical Exam:  /59 (BP Location: Left arm, Patient Position: Sitting, Cuff Size: Adult Regular)   Pulse 101   SpO2 97%   General Appearance: healthy, alert and no distress   Skin: no suspicious lesions or rashes  Neuro: Normal strength and tone, mentation intact and speech normal  Vascular: good pulses, and capillary refill   Lymph: no lymphadenopathy   Psych:  mentation appears normal and affect normal/bright  Resp: no increased work of breathing    Bilateral Lower Extremity Exam:  Inspection: wounds healing well. Right draining area appears dry, with eschar over it.  Effusion: minimal   ROM: easily tolerates 0-75. No crepitations   Tender: non-tender    Strength: able to do SLR's easily      X-rays:  Obtained today of bilateral knees: 3-views, reviewed in the office with the patient by myself today and show about a 1mm stepoff of the right articular surface on the lateral view, left shows a 2mm stepoff on the lateral view, so both articular surfaces have shifted a bit.      ASSESSMENT:   Doing well status post open reduction and internal fixation comminuted intra-articular fractures.    PLAN:   Open hinges on braces 0-50. Increase by 10 each  week as pain allows.  Work on heel slides. NO open chain range of motion or strengthening   Range of motion should be painfree.  SLR's for strengthening  Scar massage with vitamin E discussed  physical therapy ordered.  Refilled Senna    Return to clinic: 3 weeks.     TESFAYE Hernández MD  Dept. Orthopedic Surgery  [facility]           Again, thank you for allowing me to participate in the care of your patient.        Sincerely,        Noman Hernández MD

## 2023-05-15 ENCOUNTER — MYC MEDICAL ADVICE (OUTPATIENT)
Dept: FAMILY MEDICINE | Facility: CLINIC | Age: 68
End: 2023-05-15
Payer: COMMERCIAL

## 2023-05-15 NOTE — TELEPHONE ENCOUNTER
Routing to PCP as an update, see MyChart message from patient below.  Patient fell at home on Thursday and fractured left hip. Had surgery on Friday. Being transferred to Rehab in [place] today.  Recently had knee surgery in April, as well.        Mónica Brothers RN  Clinical Triage/Primary Care  [facility]

## 2023-05-29 ENCOUNTER — MEDICAL CORRESPONDENCE (OUTPATIENT)
Dept: HEALTH INFORMATION MANAGEMENT | Facility: CLINIC | Age: 68
End: 2023-05-29
Payer: COMMERCIAL

## 2023-05-30 PROBLEM — S72.142A CLOSED DISPLACED INTERTROCHANTERIC FRACTURE OF LEFT FEMUR (H): Status: ACTIVE | Noted: 2023-05-11

## 2023-05-30 NOTE — PROGRESS NOTES
"SUBJECTIVE:   Kaykay Rockwell is a 67 year old year old female who is here today for follow up after 4/14/23 PROCEDURE: Open reduction and internal fixation bilateral patella fractures.    Other issues: On Thursday, May 11th, lost  balance and fell breaking left hip. Surgery performed on Friday, May 12th at [facility]. left IT fracture-- I had seen her earlier in the day.    Present symptoms: hip is doing well. She is weight bearing as tolerated for that   Treatments tried to this point: no physical therapy yet for the knees, although at short term rehab she was given some exercises that apply to the knees.    Review of Systems:  Constitutional/General: Negative for fever, chills, change in weight  Integumentary/Skin: Negative for worrisome rashes, moles, or lesions  Neuro: Negative for weakness, dizziness, or paresthesias   Psychiatric: negative for changes in mood or affect    OBJECTIVE:  Physical Exam:  BP 99/63 (BP Location: Left arm, Patient Position: Sitting, Cuff Size: Adult Regular)   Pulse 101   Ht 1.651 m (5' 5\")   Wt 61.7 kg (136 lb)   SpO2 98%   BMI 22.63 kg/m    General Appearance: healthy, alert and no distress   Skin: no suspicious lesions or rashes  Neuro: Normal strength and tone, mentation intact and speech normal  Vascular: good pulses, and capillary refill   Lymph: no lymphadenopathy   Psych:  mentation appears normal and affect normal/bright  Resp: no increased work of breathing    Bilateral Lower Extremity Exam:  Inspection: minimal swelling   No drainage from the right knee for a long time.   ROM: easily from 0-90,  She is in her Tscope braces   Tender: non-tender around the patellas   Strength: easily does a SLR   No crepitations noted with knee range of motion      X-rays:  None new today.     ASSESSMENT:   Doing well status post open reduction and internal fixation bilateral patella fractures. 6 weeks postoperative.    PLAN:   Ok to discontinue braces at rest  Weight bearing as tolerated "   Increased braces to 90 flexion, increase by 10 weekly. Discontinue brace in 4 weeks expected.  physical therapy ordered to piggy back onto her hip home physical therapy  Hospital bed ordered. She has a sacral decubitus, so this was ordered with an air mattress.    Return to clinic: 3-4 weeks     TESFAYE Hernández MD  Dept. Orthopedic Surgery  [facility]

## 2023-06-01 ENCOUNTER — OFFICE VISIT (OUTPATIENT)
Dept: ORTHOPEDICS | Facility: CLINIC | Age: 68
End: 2023-06-01
Payer: COMMERCIAL

## 2023-06-01 ENCOUNTER — ALLIED HEALTH/NURSE VISIT (OUTPATIENT)
Dept: EDUCATION SERVICES | Facility: CLINIC | Age: 68
End: 2023-06-01
Payer: COMMERCIAL

## 2023-06-01 VITALS
DIASTOLIC BLOOD PRESSURE: 63 MMHG | OXYGEN SATURATION: 98 % | BODY MASS INDEX: 22.66 KG/M2 | WEIGHT: 136 LBS | HEIGHT: 65 IN | SYSTOLIC BLOOD PRESSURE: 99 MMHG | HEART RATE: 101 BPM

## 2023-06-01 DIAGNOSIS — E11.9 TYPE 2 DIABETES MELLITUS WITHOUT COMPLICATION, WITHOUT LONG-TERM CURRENT USE OF INSULIN (H): ICD-10-CM

## 2023-06-01 DIAGNOSIS — Z09 POSTOPERATIVE FOLLOW-UP: ICD-10-CM

## 2023-06-01 DIAGNOSIS — S82.099D: Primary | ICD-10-CM

## 2023-06-01 PROBLEM — K59.00 CONSTIPATION, UNSPECIFIED CONSTIPATION TYPE: Status: ACTIVE | Noted: 2023-06-01

## 2023-06-01 PROCEDURE — G0108 DIAB MANAGE TRN  PER INDIV: HCPCS

## 2023-06-01 PROCEDURE — 99024 POSTOP FOLLOW-UP VISIT: CPT | Performed by: ORTHOPAEDIC SURGERY

## 2023-06-01 RX ORDER — INSULIN GLARGINE 100 [IU]/ML
16 INJECTION, SOLUTION SUBCUTANEOUS DAILY
Qty: 15 ML | Refills: 0 | Status: SHIPPED | OUTPATIENT
Start: 2023-06-01 | End: 2023-06-07

## 2023-06-01 ASSESSMENT — PAIN SCALES - GENERAL: PAINLEVEL: MILD PAIN (2)

## 2023-06-01 NOTE — PROGRESS NOTES
Diabetes Self-Management Education & Support    Presents for: Follow-up    Type of Service: In Person Visit    Assessment Type:   ASSESSMENT:  Liat is here today for follow-up from having surgery having been in a rehab center and now she is at home attempting to manage her diabetes I am sure due to pain and different environment and things that she has gone through her blood sugars have been climbing she is agreed to start GLP-1 and we started her on Ozempic to get her blood sugars under better control my goal is to get her off insulin stay on the GLP-1 and the metformin we went over a lot of food choices to make that would help her and protein as well her  is very attentive and helping out supportive and we will follow-up with her via MyChart since at home is harder to get her in with a wheelchair braces and everything she has going on at this time no further education will be needed its mostly just insulin adjustment and GLP-1 adjustment very delightful couple to work with this is very overwhelming for her and I plan to be as supportive to her as that can.  I have also reviewed hypoglycemia and how to take Ozempic both directions are in the plan  Patient's most recent   Lab Results   Component Value Date    A1C 9.0 04/12/2023     is not meeting goal of <7.0    Diabetes knowledge and skills assessment:   Patient is knowledgeable in diabetes management concepts related to: Healthy Eating, Being Active, Monitoring, Taking Medication, Problem Solving, Reducing Risks and Healthy Coping    Continue education with the following diabetes management concepts: Healthy Eating, Monitoring, Taking Medication and Problem Solving    Based on learning assessment above, most appropriate setting for further diabetes education would be: Individual setting.      PLAN  Diabetes Support Resources:  1. Breakfast , oatmeal/cereal and blueberries and 1/2 protein shake and milk or if having eggs and donato and fruit and toast , save  protein shake for snack and have the yogurt.    2. For lunch salad and cheese and creamy tomato soup and have a fruit, or sloppy Joes with some fruit.  Or chips  3. You could make a smoothie with your protein shake and add berries and have that for snack as well.  4. Take 16 units of Basaglar daily, let me know if having any lows.  5. Ozempic start with 0.25 mg weekly X 4 wks then week 5 increase to 0.5 mg after that.  This pen is good for 6 wks.       COMMUNICATE WITH ME IN 2 WKS FOR SURE AND LET ME KNOW HOW YOU ARE DOING.      Ozempic is a once a week injectable medication for diabetes.  It comes in a multi-use pen and requires a pen needle for administration.  Needles for the Ozempic pen are one time use ONLY and should be disposed in a sharps container after use.  Ozempic can be taken with or without food.   If you miss an Ozempic dose, take the missed dose as soon as possible within 5 days after the missed dose.  If more than 5 days have passed, skip the missed dose, and take your next dose on your regularly scheduled day.  Unopened Ozempic pens should be stored in the refrigerator.  Do NOT freeze.  Ozempic can be stored at room temperature for 56 days.   To use your Ozempic pen:    Check to make sure the pen is clear and colorless, check the expiration date    Wash hands    Choose injection site: stomach (abdomen) or outer thigh    Clean injection site with alcohol wipe    Attach a new needle. Tear off the paper tab, push and turn the needle on until it is tight.  Remove BOTH needle caps.    Check the Ozempic flow with each new pen by turning the dose selector until the dose counter shows two small dots (..).  Press and hold in the dose button until the dose counter shows 0.  Make sure a drop appears at the needle tip.    Select your injection dose by turning the dose indicator until it reads your dose 0.25 mg weekly X 4 wks then 5th and 6th week take 0.5 mg weekly    Insert the needle into your skin and press  the button.  HOLD while slowly counting to 6 and then remove    Carefully remove the needle and dispose in sharps container.  Put the pen cap back on for storage.  For a video demonstration, visit: About the Ozempic  Pen  Ozempic  (semaglutide) injection 0.5 mg or 1 mg  For more information and frequently asked questions about Ozempic, visit: [email or web address]    Signs/symptoms of low blood sugar include (but are not limited to): sweating, shaking, feeling dizzy or weak, extreme hunger, headache, feeling crabby or confused, numbness or tingling of mouth/lips.  If you have these symptoms check your blood sugar if you can and then consume carbohydrate (sugar)  This is a helpful reminder on how to treat a blood sugar if you are low:  If your blood sugar is < 70 mg/dL, consume 15 grams of fast-acting carbohydrate (4 glucose tabs OR 4 oz juice or non-diet pop OR 2 Tbsp dried fruit OR 5-7 lifesavers OR 1 Tbsp sugar) and wait 15 minutes. Check blood sugar again and if not rising, repeat.  If your blood sugar is < 50 mg/dL, consume 30 grams of fast-acting carbohydrate (8 glucose tabs OR 8 oz juice or non-diet pop OR 4 Tbsp dried fruit OR 10-14 lifesavers OR 2 Tbsp sugar) and wait 15 minutes. Check blood sugar again and if not rising, repeat.     Topics to cover at upcoming visits: Healthy Eating, Monitoring, Taking Medication and Problem Solving    Follow-up:     See Care Plan for co-developed, patient-state behavior change goals.  AVS provided for patient today.    Education Materials Provided:  Hypoglycemia Signs and Symptoms      SUBJECTIVE/OBJECTIVE:  Presents for: Follow-up  Accompanied by: Self  Diabetes education in the past 24mo: No  Focus of Visit: Diabetes Pathophysiology, Taking Medication, Healthy Eating, Monitoring  Diabetes type: Type 2  Date of diagnosis: 1/2022  Disease course: Getting harder to manage  How confident are you filling out medical forms by yourself:: Quite a bit  Diabetes management  "related comments/concerns: Fell , broke knee caps and needed surgery  Transportation concerns: Yes  Difficulty affording diabetes medication?: No  Difficulty affording diabetes testing supplies?: No  Other concerns:: None  Cultural Influences/Ethnic Background:  Not  or       Diabetes Symptoms & Complications:  Fatigue: Sometimes  Neuropathy: Sometimes  Polydipsia: No  Polyphagia: No  Polyuria: No  Visual change: No  Slow healing wounds: No  Symptom course: Worsening  Weight trend: Stable       Patient Problem List and Family Medical History reviewed for relevant medical history, current medical status, and diabetes risk factors.    Vitals:  LMP  (LMP Unknown)   Estimated body mass index is 22.63 kg/m  as calculated from the following:    Height as of an earlier encounter on 6/1/23: 1.651 m (5' 5\").    Weight as of an earlier encounter on 6/1/23: 61.7 kg (136 lb).   Last 3 BP:   BP Readings from Last 3 Encounters:   06/01/23 100/62   06/01/23 99/63   05/11/23 100/59       History   Smoking Status     Former     Packs/day: 0.50     Years: 39.00     Types: Cigarettes   Smokeless Tobacco     Never       Labs:  Lab Results   Component Value Date    A1C 9.0 04/12/2023     Lab Results   Component Value Date     04/14/2023     04/12/2023     Lab Results   Component Value Date    LDL 89 01/11/2023     Direct Measure HDL   Date Value Ref Range Status   01/11/2023 58 >=50 mg/dL Final   ]  GFR Estimate   Date Value Ref Range Status   04/12/2023 80 >60 mL/min/1.73m2 Final     Comment:     eGFR calculated using 2021 CKD-EPI equation.     No results found for: GFRESTBLACK  Lab Results   Component Value Date    CR 0.80 04/12/2023     No results found for: MICROALBUMIN    Healthy Eating:  Healthy Eating Assessed Today: Yes  Meal planning/habits: Avoiding sweets, Heart healthy, Low salt, Plate planning method  How many times a week on average do you eat food made away from home (restaurant/take-out)?: " 2  Meals include: Breakfast, Lunch, Dinner, Morning Snack, Afternoon Snack, Evening Snack  Breakfast: cheerios, berries and V-8  Lunch: soup and salad, creamy tomato, cream of mushroom  Dinner: braut taylor and bun, salad, crm of mushroom soup and milk  Snacks: handful of nuts, or apple slices and cheese or gold fish or banana  Beverages: Water, Tea, Coffee, Diet soda  Has patient met with a dietitian in the past?: No    Being Active:  Being Active Assessed Today: Yes  Exercise:: Yes  Days per week of moderate to strenuous exercise (like a brisk walk): 7  On average, minutes per day of exercise at this level: 30  How intense was your typical exercise? : Light (like stretching or slow walking)  Exercise Minutes per Week: 210  Barrier to exercise: None, Physical limitation    Monitoring:  Monitoring Assessed Today: Yes  Did patient bring glucose meter to appointment? : Yes  Blood Glucose Meter: Accu-chek  Times checking blood sugar at home (number): 2  Times checking blood sugar at home (per): Day  Blood glucose trend: Fluctuating                              Taking Medications:  Diabetes Medication(s)     Biguanides       metFORMIN (GLUCOPHAGE XR) 500 MG 24 hr tablet    Take 2 tablets (1,000 mg) by mouth 2 times daily (with meals)    Insulin       insulin glargine (BASAGLAR KWIKPEN) 100 UNIT/ML pen    Inject 16 Units Subcutaneous daily    Incretin Mimetic Agents       semaglutide (OZEMPIC) 2 MG/3ML pen    Inject 0.5 mg Subcutaneous every 7 days Start with 0.25 mg weekly X 4 wks then increase to 0.5 mg weekly          Taking Medication Assessed Today: Yes  Current Treatments: Oral Medication (taken by mouth)  Problems taking diabetes medications regularly?: No  Diabetes medication side effects?: No    Problem Solving:                 Reducing Risks:  Diabetes Risks: Age over 45 years, Family History  CAD Risks: Diabetes Mellitus, Family history, Hypertension  Has dilated eye exam at least once a year?: No  Sees dentist  every 6 months?: Yes  Feet checked by healthcare provider in the last year?: No    Healthy Coping:  Healthy Coping Assessed Today: Yes  Emotional response to diabetes: Concern for health and well-being, Ready to learn, Acceptance  Informal Support system:: Children, Family, Friends, Spouse  Stage of change: ACTION (Actively working towards change)  Patient Activation Measure Survey Score:      4/6/2023     1:00 PM   JULIAN Score (Last Two)   JULIAN Raw Score 40   Activation Score 100   JULIAN Level 4         Care Plan and Education Provided:  GLP-1 administration technique taught today. Patient verbalized understanding and was able to perform an accurate return demonstration of administration technique. Side effects were discussed, if patient has any abdominal pain, with or without nausea and/or vomiting, stop medication, call provider, clinic or go to the emergency room.  Care Plan: Diabetes   Updates made by Ellen Min RN since 6/1/2023 12:00 AM      Problem: HbA1C Not In Goal       Goal: Establish Regular Follow-Ups with PCP    Start Date: 6/1/2023   Recent Progress: 100%      Problem: Diabetes Self-Management Education Needed to Optimize Self-Care Behaviors       Goal: Healthy Eating - follow a healthy eating pattern for diabetes    Start Date: 6/1/2023   Recent Progress: 70%      Goal: Monitoring - monitor glucose and ketones as directed    Start Date: 6/1/2023   This Visit's Progress: 100%   Recent Progress: 90%      Goal: Taking Medication - patient is consistently taking medications as directed    Start Date: 6/1/2023   This Visit's Progress: 90%   Recent Progress: 50%      Goal: Problem Solving - know how to prevent and manage short-term diabetes complications    Start Date: 6/1/2023   This Visit's Progress: 60%   Recent Progress: 50%      Goal: Healthy Coping - use available resources to cope with the challenges of managing diabetes    Start Date: 6/1/2023   Recent Progress: 100%          Rahel Min  RN/DARNELL Martini Diabetes Educator      Time Spent: 60 minutes  Encounter Type: Individual    Any diabetes medication dose changes were made via the CDE Protocol per the patient's primary care provider. A copy of this encounter was shared with the provider.

## 2023-06-01 NOTE — LETTER
"    6/1/2023         RE: Kaykay Rockwell  [address]  [place] [address]        Dear Colleague,    Thank you for referring your patient, Kayaky Rockwell, to the [facility]. Please see a copy of my visit note below.    SUBJECTIVE:   Kaykay Rockwell is a 67 year old year old female who is here today for follow up after 4/14/23 PROCEDURE: Open reduction and internal fixation bilateral patella fractures.    Other issues: On Thursday, May 11th, lost  balance and fell breaking left hip. Surgery performed on Friday, May 12th at [facility]. left IT fracture-- I had seen her earlier in the day.    Present symptoms: hip is doing well. She is weight bearing as tolerated for that   Treatments tried to this point: no physical therapy yet for the knees, although at short term rehab she was given some exercises that apply to the knees.    Review of Systems:  Constitutional/General: Negative for fever, chills, change in weight  Integumentary/Skin: Negative for worrisome rashes, moles, or lesions  Neuro: Negative for weakness, dizziness, or paresthesias   Psychiatric: negative for changes in mood or affect    OBJECTIVE:  Physical Exam:  BP 99/63 (BP Location: Left arm, Patient Position: Sitting, Cuff Size: Adult Regular)   Pulse 101   Ht 1.651 m (5' 5\")   Wt 61.7 kg (136 lb)   SpO2 98%   BMI 22.63 kg/m    General Appearance: healthy, alert and no distress   Skin: no suspicious lesions or rashes  Neuro: Normal strength and tone, mentation intact and speech normal  Vascular: good pulses, and capillary refill   Lymph: no lymphadenopathy   Psych:  mentation appears normal and affect normal/bright  Resp: no increased work of breathing    Bilateral Lower Extremity Exam:  Inspection: minimal swelling   No drainage from the right knee for a long time.   ROM: easily from 0-90,  She is in her Tscope braces   Tender: non-tender around the patellas   Strength: easily does a SLR   No crepitations noted with " knee range of motion      X-rays:  None new today.     ASSESSMENT:   Doing well status post open reduction and internal fixation bilateral patella fractures. 6 weeks postoperative.    PLAN:   Ok to discontinue braces at rest  Weight bearing as tolerated   Increased braces to 90 flexion, increase by 10 weekly. Discontinue brace in 4 weeks expected.  physical therapy ordered to piggy back onto her hip home physical therapy  Hospital bed ordered. She has a sacral decubitus, so this was ordered with an air mattress.    Return to clinic: 3-4 weeks     TESFAYE Hernández MD  Dept. Orthopedic Surgery  [facility]           Again, thank you for allowing me to participate in the care of your patient.        Sincerely,        Noman Hernández MD

## 2023-06-01 NOTE — PATIENT INSTRUCTIONS
Diabetes Support Resources:  Breakfast , oatmeal/cereal and blueberries and 1/2 protein shake and milk or if having eggs and donato and fruit and toast , save protein shake for snack and have the yogurt.    For lunch salad and cheese and creamy tomato soup and have a fruit, or sloppy Joes with some fruit.  Or chips  You could make a smoothie with your protein shake and add berries and have that for snack as well.  Take 16 units of Basaglar daily, let me know if having any lows.  Ozempic start with 0.25 mg weekly X 4 wks then week 5 increase to 0.5 mg after that.  This pen is good for 6 wks.       COMMUNICATE WITH ME IN 2 WKS FOR SURE AND LET ME KNOW HOW YOU ARE DOING.      Ozempic is a once a week injectable medication for diabetes.  It comes in a multi-use pen and requires a pen needle for administration.  Needles for the Ozempic pen are one time use ONLY and should be disposed in a sharps container after use.  Ozempic can be taken with or without food.   If you miss an Ozempic dose, take the missed dose as soon as possible within 5 days after the missed dose.  If more than 5 days have passed, skip the missed dose, and take your next dose on your regularly scheduled day.  Unopened Ozempic pens should be stored in the refrigerator.  Do NOT freeze.  Ozempic can be stored at room temperature for 56 days.   To use your Ozempic pen:  Check to make sure the pen is clear and colorless, check the expiration date  Wash hands  Choose injection site: stomach (abdomen) or outer thigh  Clean injection site with alcohol wipe  Attach a new needle. Tear off the paper tab, push and turn the needle on until it is tight.  Remove BOTH needle caps.  Check the Ozempic flow with each new pen by turning the dose selector until the dose counter shows two small dots (..).  Press and hold in the dose button until the dose counter shows 0.  Make sure a drop appears at the needle tip.  Select your injection dose by turning the dose indicator  until it reads your dose 0.25 mg weekly X 4 wks then 5th and 6th week take 0.5 mg weekly  Insert the needle into your skin and press the button.  HOLD while slowly counting to 6 and then remove  Carefully remove the needle and dispose in sharps container.  Put the pen cap back on for storage.  For a video demonstration, visit: About the Ozempic  Pen  Ozempic  (semaglutide) injection 0.5 mg or 1 mg  For more information and frequently asked questions about Ozempic, visit: [email or web address]    Signs/symptoms of low blood sugar include (but are not limited to): sweating, shaking, feeling dizzy or weak, extreme hunger, headache, feeling crabby or confused, numbness or tingling of mouth/lips.  If you have these symptoms check your blood sugar if you can and then consume carbohydrate (sugar)  This is a helpful reminder on how to treat a blood sugar if you are low:  If your blood sugar is < 70 mg/dL, consume 15 grams of fast-acting carbohydrate (4 glucose tabs OR 4 oz juice or non-diet pop OR 2 Tbsp dried fruit OR 5-7 lifesavers OR 1 Tbsp sugar) and wait 15 minutes. Check blood sugar again and if not rising, repeat.  If your blood sugar is < 50 mg/dL, consume 30 grams of fast-acting carbohydrate (8 glucose tabs OR 8 oz juice or non-diet pop OR 4 Tbsp dried fruit OR 10-14 lifesavers OR 2 Tbsp sugar) and wait 15 minutes. Check blood sugar again and if not rising, repeat.          Bring blood glucose meter and logbook with you to all doctor and follow-up appointments.    Diabetes Education Telephone Visit Follow-up:    We realize your time is valuable and your health is important! We offer a convenient Telephone Visit follow up! It s a quick way to check in for a medication dose adjustment without having to come back to clinic as soon.    Telephone Visits are often covered by insurance. Please check with your insurance plan to see if this type of visit is covered. If not, the cost is less expensive than an office  visit:    Up to 10 minutes (Code 46953): $30  11-20 minutes (Code 95600): $59  More than 20 minutes (Code 46215): $85    Talk with your Diabetes Educator if you want to learn more.      [facility]:  For Your Diabetes Education and Nutrition Appointments Call:  [phone]   For Diabetes Education or Nutrition Related Questions:   Phone: [phone]  Send Quip Message   If you need a medication refill please contact your pharmacy. Please allow 3 business days for your refills to be completed.

## 2023-06-01 NOTE — LETTER
6/1/2023         RE: Kaykay Rockwell  [address]  [place] [address]        Dear Colleague,    Thank you for referring your patient, Kaykay Rockwell, to the [facility]. Please see a copy of my visit note below.    Diabetes Self-Management Education & Support    Presents for: Follow-up    Type of Service: In Person Visit    Assessment Type:   ASSESSMENT:  Liat is here today for follow-up from having surgery having been in a rehab center and now she is at home attempting to manage her diabetes I am sure due to pain and different environment and things that she has gone through her blood sugars have been climbing she is agreed to start GLP-1 and we started her on Ozempic to get her blood sugars under better control my goal is to get her off insulin stay on the GLP-1 and the metformin we went over a lot of food choices to make that would help her and protein as well her  is very attentive and helping out supportive and [facility] since at home is harder to get her in with a wheelchair braces and everything she has going on at this time no further education will be needed its mostly just insulin adjustment and GLP-1 adjustment very delightful couple to work with this is very overwhelming for her and I plan to be as supportive to her as that can.  I have also reviewed hypoglycemia and how to take Ozempic both directions are in the plan  Patient's most recent   Lab Results   Component Value Date    A1C 9.0 04/12/2023     is not meeting goal of <7.0    Diabetes knowledge and skills assessment:   Patient is knowledgeable in diabetes management concepts related to: Healthy Eating, Being Active, Monitoring, Taking Medication, Problem Solving, Reducing Risks and Healthy Coping    Continue education with the following diabetes management concepts: Healthy Eating, Monitoring, Taking Medication and Problem Solving    Based on learning assessment above,  most appropriate setting for further diabetes education would be: Individual setting.      PLAN  Diabetes Support Resources:  1. Breakfast , oatmeal/cereal and blueberries and 1/2 protein shake and milk or if having eggs and donato and fruit and toast , save protein shake for snack and have the yogurt.    2. For lunch salad and cheese and creamy tomato soup and have a fruit, or sloppy Joes with some fruit.  Or chips  3. You could make a smoothie with your protein shake and add berries and have that for snack as well.  4. Take 16 units of Basaglar daily, let me know if having any lows.  5. Ozempic start with 0.25 mg weekly X 4 wks then week 5 increase to 0.5 mg after that.  This pen is good for 6 wks.       COMMUNICATE WITH ME IN 2 WKS FOR SURE AND LET ME KNOW HOW YOU ARE DOING.      Ozempic is a once a week injectable medication for diabetes.  It comes in a multi-use pen and requires a pen needle for administration.  Needles for the Ozempic pen are one time use ONLY and should be disposed in a sharps container after use.  Ozempic can be taken with or without food.   If you miss an Ozempic dose, take the missed dose as soon as possible within 5 days after the missed dose.  If more than 5 days have passed, skip the missed dose, and take your next dose on your regularly scheduled day.  Unopened Ozempic pens should be stored in the refrigerator.  Do NOT freeze.  Ozempic can be stored at room temperature for 56 days.   To use your Ozempic pen:    Check to make sure the pen is clear and colorless, check the expiration date    Wash hands    Choose injection site: stomach (abdomen) or outer thigh    Clean injection site with alcohol wipe    Attach a new needle. Tear off the paper tab, push and turn the needle on until it is tight.  Remove BOTH needle caps.    Check the Ozempic flow with each new pen by turning the dose selector until the dose counter shows two small dots (..).  Press and hold in the dose button until the  dose counter shows 0.  Make sure a drop appears at the needle tip.    Select your injection dose by turning the dose indicator until it reads your dose 0.25 mg weekly X 4 wks then 5th and 6th week take 0.5 mg weekly    Insert the needle into your skin and press the button.  HOLD while slowly counting to 6 and then remove    Carefully remove the needle and dispose in sharps container.  Put the pen cap back on for storage.  For a video demonstration, visit: About the Ozempic  Pen  Ozempic  (semaglutide) injection 0.5 mg or 1 mg  For more information and frequently asked questions about Ozempic, visit: [email or web address].com/    Signs/symptoms of low blood sugar include (but are not limited to): sweating, shaking, feeling dizzy or weak, extreme hunger, headache, feeling crabby or confused, numbness or tingling of mouth/lips.  If you have these symptoms check your blood sugar if you can and then consume carbohydrate (sugar)  This is a helpful reminder on how to treat a blood sugar if you are low:  If your blood sugar is < 70 mg/dL, consume 15 grams of fast-acting carbohydrate (4 glucose tabs OR 4 oz juice or non-diet pop OR 2 Tbsp dried fruit OR 5-7 lifesavers OR 1 Tbsp sugar) and wait 15 minutes. Check blood sugar again and if not rising, repeat.  If your blood sugar is < 50 mg/dL, consume 30 grams of fast-acting carbohydrate (8 glucose tabs OR 8 oz juice or non-diet pop OR 4 Tbsp dried fruit OR 10-14 lifesavers OR 2 Tbsp sugar) and wait 15 minutes. Check blood sugar again and if not rising, repeat.     Topics to cover at upcoming visits: Healthy Eating, Monitoring, Taking Medication and Problem Solving    Follow-up:     See Care Plan for co-developed, patient-state behavior change goals.  AVS provided for patient today.    Education Materials Provided:  Hypoglycemia Signs and Symptoms      SUBJECTIVE/OBJECTIVE:  Presents for: Follow-up  Accompanied by: Self  Diabetes education in the past 24mo: No  Focus of Visit:  "Diabetes Pathophysiology, Taking Medication, Healthy Eating, Monitoring  Diabetes type: Type 2  Date of diagnosis: 1/2022  Disease course: Getting harder to manage  How confident are you filling out medical forms by yourself:: Quite a bit  Diabetes management related comments/concerns: Fell , broke knee caps and needed surgery  Transportation concerns: Yes  Difficulty affording diabetes medication?: No  Difficulty affording diabetes testing supplies?: No  Other concerns:: None  Cultural Influences/Ethnic Background:  Not  or       Diabetes Symptoms & Complications:  Fatigue: Sometimes  Neuropathy: Sometimes  Polydipsia: No  Polyphagia: No  Polyuria: No  Visual change: No  Slow healing wounds: No  Symptom course: Worsening  Weight trend: Stable       Patient Problem List and Family Medical History reviewed for relevant medical history, current medical status, and diabetes risk factors.    Vitals:  LMP  (LMP Unknown)   Estimated body mass index is 22.63 kg/m  as calculated from the following:    Height as of an earlier encounter on 6/1/23: 1.651 m (5' 5\").    Weight as of an earlier encounter on 6/1/23: 61.7 kg (136 lb).   Last 3 BP:   BP Readings from Last 3 Encounters:   06/01/23 100/62   06/01/23 99/63   05/11/23 100/59       History   Smoking Status     Former     Packs/day: 0.50     Years: 39.00     Types: Cigarettes   Smokeless Tobacco     Never       Labs:  Lab Results   Component Value Date    A1C 9.0 04/12/2023     Lab Results   Component Value Date     04/14/2023     04/12/2023     Lab Results   Component Value Date    LDL 89 01/11/2023     Direct Measure HDL   Date Value Ref Range Status   01/11/2023 58 >=50 mg/dL Final   ]  GFR Estimate   Date Value Ref Range Status   04/12/2023 80 >60 mL/min/1.73m2 Final     Comment:     eGFR calculated using 2021 CKD-EPI equation.     No results found for: GFRESTBLACK  Lab Results   Component Value Date    CR 0.80 04/12/2023     No results " found for: MICROALBUMIN    Healthy Eating:  Healthy Eating Assessed Today: Yes  Meal planning/habits: Avoiding sweets, Heart healthy, Low salt, Plate planning method  How many times a week on average do you eat food made away from home (restaurant/take-out)?: 2  Meals include: Breakfast, Lunch, Dinner, Morning Snack, Afternoon Snack, Evening Snack  Breakfast: cheerios, berries and V-8  Lunch: soup and salad, creamy tomato, cream of mushroom  Dinner: braut taylor and bun, salad, crm of mushroom soup and milk  Snacks: handful of nuts, or apple slices and cheese or gold fish or banana  Beverages: Water, Tea, Coffee, Diet soda  Has patient met with a dietitian in the past?: No    Being Active:  Being Active Assessed Today: Yes  Exercise:: Yes  Days per week of moderate to strenuous exercise (like a brisk walk): 7  On average, minutes per day of exercise at this level: 30  How intense was your typical exercise? : Light (like stretching or slow walking)  Exercise Minutes per Week: 210  Barrier to exercise: None, Physical limitation    Monitoring:  Monitoring Assessed Today: Yes  Did patient bring glucose meter to appointment? : Yes  Blood Glucose Meter: Accu-chek  Times checking blood sugar at home (number): 2  Times checking blood sugar at home (per): Day  Blood glucose trend: Fluctuating                              Taking Medications:  Diabetes Medication(s)     Biguanides       metFORMIN (GLUCOPHAGE XR) 500 MG 24 hr tablet    Take 2 tablets (1,000 mg) by mouth 2 times daily (with meals)    Insulin       insulin glargine (BASAGLAR KWIKPEN) 100 UNIT/ML pen    Inject 16 Units Subcutaneous daily    Incretin Mimetic Agents       semaglutide (OZEMPIC) 2 MG/3ML pen    Inject 0.5 mg Subcutaneous every 7 days Start with 0.25 mg weekly X 4 wks then increase to 0.5 mg weekly          Taking Medication Assessed Today: Yes  Current Treatments: Oral Medication (taken by mouth)  Problems taking diabetes medications regularly?:  No  Diabetes medication side effects?: No    Problem Solving:                 Reducing Risks:  Diabetes Risks: Age over 45 years, Family History  CAD Risks: Diabetes Mellitus, Family history, Hypertension  Has dilated eye exam at least once a year?: No  Sees dentist every 6 months?: Yes  Feet checked by healthcare provider in the last year?: No    Healthy Coping:  Healthy Coping Assessed Today: Yes  Emotional response to diabetes: Concern for health and well-being, Ready to learn, Acceptance  Informal Support system:: Children, Family, Friends, Spouse  Stage of change: ACTION (Actively working towards change)  Patient Activation Measure Survey Score:      4/6/2023     1:00 PM   JULIAN Score (Last Two)   JULIAN Raw Score 40   Activation Score 100   JULIAN Level 4         Care Plan and Education Provided:  GLP-1 administration technique taught today. Patient verbalized understanding and was able to perform an accurate return demonstration of administration technique. Side effects were discussed, if patient has any abdominal pain, with or without nausea and/or vomiting, stop medication, call provider, clinic or go to the emergency room.  Care Plan: Diabetes   Updates made by Ellen Min RN since 6/1/2023 12:00 AM      Problem: HbA1C Not In Goal       Goal: Establish Regular Follow-Ups with PCP    Start Date: 6/1/2023   Recent Progress: 100%      Problem: Diabetes Self-Management Education Needed to Optimize Self-Care Behaviors       Goal: Healthy Eating - follow a healthy eating pattern for diabetes    Start Date: 6/1/2023   Recent Progress: 70%      Goal: Monitoring - monitor glucose and ketones as directed    Start Date: 6/1/2023   This Visit's Progress: 100%   Recent Progress: 90%      Goal: Taking Medication - patient is consistently taking medications as directed    Start Date: 6/1/2023   This Visit's Progress: 90%   Recent Progress: 50%      Goal: Problem Solving - know how to prevent and manage short-term diabetes  complications    Start Date: 6/1/2023   This Visit's Progress: 60%   Recent Progress: 50%      Goal: Healthy Coping - use available resources to cope with the challenges of managing diabetes    Start Date: 6/1/2023   Recent Progress: 100%          Rahel Min RN/DARNELL Martini Diabetes Educator      Time Spent: 60 minutes  Encounter Type: Individual    Any diabetes medication dose changes were made via the CDE Protocol per the patient's primary care provider. A copy of this encounter was shared with the provider.

## 2023-06-05 ENCOUNTER — TELEPHONE (OUTPATIENT)
Dept: FAMILY MEDICINE | Facility: CLINIC | Age: 68
End: 2023-06-05
Payer: COMMERCIAL

## 2023-06-05 NOTE — TELEPHONE ENCOUNTER
Reason for Call:  Form, our goal is to have forms completed with 72 hours, however, some forms may require a visit or additional information.    Type of letter, form or note:  Home Health Certification    Who is the form from?: Home care    Where did the form come from: form was faxed in    What clinic location was the form placed at?: [place]    Where the form was placed: Given to physician    What number is listed as a contact on the form?: [phone]       Additional comments: Placed in your basket    Call taken on 6/5/2023 at 3:03 PM by Rosalba Gregg MA

## 2023-06-05 NOTE — TELEPHONE ENCOUNTER
Form completed.   They will also need to get orders from the provider managing this condition - Dr. James Blum  Closed left hip fracture, initial encounter (HC)     James Blum PA    [id number] [facility] [facility] [place], [address]    [place] [address]    Phone: [phone]    Fax: [phone]       She was just seen by this provider today. June 5, 2023  Thank you .   Kristen Kehr PA-C

## 2023-06-06 ENCOUNTER — MEDICAL CORRESPONDENCE (OUTPATIENT)
Dept: HEALTH INFORMATION MANAGEMENT | Facility: CLINIC | Age: 68
End: 2023-06-06

## 2023-06-06 NOTE — TELEPHONE ENCOUNTER
Faxed HHC to [facility] and to stat scanning.Rosalba Gregg MA/TC

## 2023-06-07 ENCOUNTER — MYC MEDICAL ADVICE (OUTPATIENT)
Dept: EDUCATION SERVICES | Facility: CLINIC | Age: 68
End: 2023-06-07
Payer: COMMERCIAL

## 2023-06-07 DIAGNOSIS — E11.9 TYPE 2 DIABETES MELLITUS WITHOUT COMPLICATION, WITHOUT LONG-TERM CURRENT USE OF INSULIN (H): ICD-10-CM

## 2023-06-07 RX ORDER — INSULIN GLARGINE 100 [IU]/ML
15 INJECTION, SOLUTION SUBCUTANEOUS DAILY
Qty: 15 ML | Refills: 0 | Status: SHIPPED | OUTPATIENT
Start: 2023-06-07 | End: 2023-07-25

## 2023-06-07 NOTE — TELEPHONE ENCOUNTER
Diabetes Education Follow-up    Subjective/Objective:    Kaykay CAMPBELL Rockwell sent in blood glucose log for review. Last date of communication was: .    Diabetes is being managed with   Diabetes Medications   Diabetes Medication(s)     Biguanides       metFORMIN (GLUCOPHAGE XR) 500 MG 24 hr tablet    Take 2 tablets (1,000 mg) by mouth 2 times daily (with meals)    Insulin       insulin glargine (BASAGLAR KWIKPEN) 100 UNIT/ML pen    Inject 16 Units Subcutaneous daily    Incretin Mimetic Agents       semaglutide (OZEMPIC) 2 MG/3ML pen    Inject 0.5 mg Subcutaneous every 7 days Start with 0.25 mg weekly X 4 wks then increase to 0.5 mg weekly          BG/Food Log:           Assessment:    BG much closer to target the last 5 days. Having some BG close to 70 mg/dl overnight the last few days    Plan/Response:  See Patient Instructions for co-developed, patient-stated behavior change goals.  Decrease insulin: 0-0-0-16 --> 0-0-0-15    Renetta Hamilton RD [facility]  Triage: [phone]  [phone]      Any diabetes medication dose changes were made via the CDE Protocol and Collaborative Practice Agreement with the patient's referring provider. A copy of this encounter was shared with the provider.

## 2023-06-09 ENCOUNTER — TELEPHONE (OUTPATIENT)
Dept: ORTHOPEDICS | Facility: CLINIC | Age: 68
End: 2023-06-09

## 2023-06-09 NOTE — TELEPHONE ENCOUNTER
[facility] Call Center    Phone Message    May a detailed message be left on voicemail: yes     Reason for Call: Other: Elida from [facility] is seeking clarification on current PT orders.  Are they able to ROM outside of the brace and manual stretching.  Please contact PT to let them know.  Thank you.     Action Taken: Other: [id number]    Travel Screening: Not Applicable

## 2023-06-13 ENCOUNTER — MYC MEDICAL ADVICE (OUTPATIENT)
Dept: ORTHOPEDICS | Facility: CLINIC | Age: 68
End: 2023-06-13
Payer: COMMERCIAL

## 2023-06-14 ENCOUNTER — MYC MEDICAL ADVICE (OUTPATIENT)
Dept: EDUCATION SERVICES | Facility: CLINIC | Age: 68
End: 2023-06-14
Payer: COMMERCIAL

## 2023-06-14 NOTE — TELEPHONE ENCOUNTER
Called and stated that yes to working out side the brace, gentle manual stretching and yes more than two steps.  April St Sherrie CMA 6/14/2023 2:02 PM

## 2023-06-14 NOTE — TELEPHONE ENCOUNTER
Diabetes Education Follow-up    Subjective/Objective:    Kaykay CAMPBELL Rockwell sent in blood glucose log for review. Last date of communication was: .    Diabetes is being managed with   Diabetes Medications   Diabetes Medication(s)     Biguanides       metFORMIN (GLUCOPHAGE XR) 500 MG 24 hr tablet    Take 2 tablets (1,000 mg) by mouth 2 times daily (with meals)    Insulin       insulin glargine (BASAGLAR KWIKPEN) 100 UNIT/ML pen    Inject 15 Units Subcutaneous daily    Incretin Mimetic Agents       semaglutide (OZEMPIC) 2 MG/3ML pen    Inject 0.5 mg Subcutaneous every 7 days Start with 0.25 mg weekly X 4 wks then increase to 0.5 mg weekly          BG/Food Log:                 Assessment:    BG very close to target, no more lows this last week.    Plan/Response:  See Patient Instructions for co-developed, patient-stated behavior change goals.  Continue with 15 units    PARRISH Alaniz [facility]  Triage: [phone]  [phone]      Any diabetes medication dose changes were made via the CDE Protocol and Collaborative Practice Agreement with the patient's referring provider. A copy of this encounter was shared with the provider.

## 2023-06-15 ENCOUNTER — MEDICAL CORRESPONDENCE (OUTPATIENT)
Dept: HEALTH INFORMATION MANAGEMENT | Facility: CLINIC | Age: 68
End: 2023-06-15
Payer: COMMERCIAL

## 2023-06-16 ENCOUNTER — TELEPHONE (OUTPATIENT)
Dept: DERMATOLOGY | Facility: CLINIC | Age: 68
End: 2023-06-16
Payer: COMMERCIAL

## 2023-06-16 NOTE — TELEPHONE ENCOUNTER
Via phone patient will call to reschedule the following:    Appointment type: New  Provider:   Rescheduled  date: 10-26-23  Specialty phone number: [phone]

## 2023-06-26 NOTE — PROGRESS NOTES
SUBJECTIVE:    Kaykay Rockwell is a 67 year old year old female who is here today for follow up after 4/14/23 PROCEDURE: Open reduction and internal fixation bilateral patella fractures.     Other issues: On Thursday, May 11th, lost  balance and fell breaking left hip. Surgery performed, May 12th at [facility]. left IT fracture.     Present symptoms: hip is doing well. She is weight bearing as tolerated for that     Present symptoms: knees feel great. Wants out of the braces.   Treatments tried to this point: doing physical therapy     Review of Systems:  Constitutional/General: Negative for fever, chills, change in weight  Integumentary/Skin: Negative for worrisome rashes, moles, or lesions  Neuro: Negative for weakness, dizziness, or paresthesias   Psychiatric: negative for changes in mood or affect    OBJECTIVE:  Physical Exam:  /61 (BP Location: Left arm, Patient Position: Sitting, Cuff Size: Adult Regular)   Pulse 73   LMP  (LMP Unknown)   SpO2 99%   General Appearance: healthy, alert and no distress   Skin: no suspicious lesions or rashes  Neuro: Normal strength and tone, mentation intact and speech normal  Vascular: good pulses, and capillary refill   Lymph: no lymphadenopathy   Psych:  mentation appears normal and affect normal/bright  Resp: no increased work of breathing    Bilateral Lower Extremity Exam:  Inspection: no swelling      ROM: 0-130 -- some crepitations -- I think from hardware subcutaneous  Tender: non-tender    Strength: no extensor lag      ASSESSMENT:   Doing well status post open reduction and internal fixation bilateral patella fractures    PLAN:   Discontinue braces.   Careful, using walker until strength improves.   Hip is limiting as well.    Continue physical therapy      Return to clinic: 2 months/ as needed     TESFAYE Hernández MD  Dept. Orthopedic Surgery  [facility]

## 2023-06-29 ENCOUNTER — MEDICAL CORRESPONDENCE (OUTPATIENT)
Dept: HEALTH INFORMATION MANAGEMENT | Facility: CLINIC | Age: 68
End: 2023-06-29

## 2023-06-29 ENCOUNTER — OFFICE VISIT (OUTPATIENT)
Dept: ORTHOPEDICS | Facility: CLINIC | Age: 68
End: 2023-06-29
Payer: COMMERCIAL

## 2023-06-29 VITALS — OXYGEN SATURATION: 99 % | DIASTOLIC BLOOD PRESSURE: 61 MMHG | SYSTOLIC BLOOD PRESSURE: 101 MMHG | HEART RATE: 73 BPM

## 2023-06-29 DIAGNOSIS — Z09 POSTOPERATIVE FOLLOW-UP: ICD-10-CM

## 2023-06-29 DIAGNOSIS — S82.009A CLOSED NONDISPLACED FRACTURE OF PATELLA, UNSPECIFIED FRACTURE MORPHOLOGY, UNSPECIFIED LATERALITY, INITIAL ENCOUNTER: Primary | ICD-10-CM

## 2023-06-29 PROCEDURE — 99024 POSTOP FOLLOW-UP VISIT: CPT | Performed by: ORTHOPAEDIC SURGERY

## 2023-06-29 ASSESSMENT — PAIN SCALES - GENERAL: PAINLEVEL: MODERATE PAIN (4)

## 2023-06-29 NOTE — LETTER
6/29/2023         RE: Kaykay Rockwell  [address]  [place] [address]        Dear Colleague,    Thank you for referring your patient, Kaykay Rockwell, to the [facility]. Please see a copy of my visit note below.    SUBJECTIVE:    Kaykay Rockwell is a 67 year old year old female who is here today for follow up after 4/14/23 PROCEDURE: Open reduction and internal fixation bilateral patella fractures.     Other issues: On Thursday, May 11th, lost  balance and fell breaking left hip. Surgery performed, May 12th at [facility]. left IT fracture.     Present symptoms: hip is doing well. She is weight bearing as tolerated for that     Present symptoms: knees feel great. Wants out of the braces.   Treatments tried to this point: doing physical therapy     Review of Systems:  Constitutional/General: Negative for fever, chills, change in weight  Integumentary/Skin: Negative for worrisome rashes, moles, or lesions  Neuro: Negative for weakness, dizziness, or paresthesias   Psychiatric: negative for changes in mood or affect    OBJECTIVE:  Physical Exam:  /61 (BP Location: Left arm, Patient Position: Sitting, Cuff Size: Adult Regular)   Pulse 73   LMP  (LMP Unknown)   SpO2 99%   General Appearance: healthy, alert and no distress   Skin: no suspicious lesions or rashes  Neuro: Normal strength and tone, mentation intact and speech normal  Vascular: good pulses, and capillary refill   Lymph: no lymphadenopathy   Psych:  mentation appears normal and affect normal/bright  Resp: no increased work of breathing    Bilateral Lower Extremity Exam:  Inspection: no swelling      ROM: 0-130 -- some crepitations -- I think from hardware subcutaneous  Tender: non-tender    Strength: no extensor lag      ASSESSMENT:   Doing well status post open reduction and internal fixation bilateral patella fractures    PLAN:   Discontinue braces.   Careful, using walker until strength improves.   Hip is  limiting as well.    Continue physical therapy      Return to clinic: 2 months/ as needed     TESFAYE Hernández MD  Dept. Orthopedic Surgery  [facility]           Again, thank you for allowing me to participate in the care of your patient.        Sincerely,        Noman Hernández MD

## 2023-07-11 ENCOUNTER — MYC MEDICAL ADVICE (OUTPATIENT)
Dept: EDUCATION SERVICES | Facility: CLINIC | Age: 68
End: 2023-07-11
Payer: COMMERCIAL

## 2023-07-21 ENCOUNTER — MEDICAL CORRESPONDENCE (OUTPATIENT)
Dept: HEALTH INFORMATION MANAGEMENT | Facility: CLINIC | Age: 68
End: 2023-07-21

## 2023-07-25 ENCOUNTER — MEDICAL CORRESPONDENCE (OUTPATIENT)
Dept: HEALTH INFORMATION MANAGEMENT | Facility: CLINIC | Age: 68
End: 2023-07-25
Payer: COMMERCIAL

## 2023-07-25 ENCOUNTER — MYC REFILL (OUTPATIENT)
Dept: EDUCATION SERVICES | Facility: CLINIC | Age: 68
End: 2023-07-25
Payer: COMMERCIAL

## 2023-07-25 DIAGNOSIS — E11.9 TYPE 2 DIABETES MELLITUS WITHOUT COMPLICATION, WITHOUT LONG-TERM CURRENT USE OF INSULIN (H): ICD-10-CM

## 2023-07-26 RX ORDER — INSULIN GLARGINE 100 [IU]/ML
13 INJECTION, SOLUTION SUBCUTANEOUS DAILY
Qty: 15 ML | Refills: 0 | Status: SHIPPED | OUTPATIENT
Start: 2023-07-26 | End: 2023-08-08

## 2023-08-02 DIAGNOSIS — E11.9 TYPE 2 DIABETES MELLITUS WITHOUT COMPLICATION, WITHOUT LONG-TERM CURRENT USE OF INSULIN (H): ICD-10-CM

## 2023-08-02 RX ORDER — SEMAGLUTIDE 0.68 MG/ML
0.5 INJECTION, SOLUTION SUBCUTANEOUS
Qty: 3 ML | Refills: 1 | Status: SHIPPED | OUTPATIENT
Start: 2023-08-02 | End: 2023-08-08

## 2023-08-08 ENCOUNTER — OFFICE VISIT (OUTPATIENT)
Dept: FAMILY MEDICINE | Facility: CLINIC | Age: 68
End: 2023-08-08
Payer: COMMERCIAL

## 2023-08-08 VITALS
WEIGHT: 133 LBS | SYSTOLIC BLOOD PRESSURE: 118 MMHG | HEART RATE: 104 BPM | BODY MASS INDEX: 22.16 KG/M2 | TEMPERATURE: 97.8 F | HEIGHT: 65 IN | OXYGEN SATURATION: 97 % | DIASTOLIC BLOOD PRESSURE: 76 MMHG | RESPIRATION RATE: 16 BRPM

## 2023-08-08 DIAGNOSIS — E11.9 TYPE 2 DIABETES MELLITUS WITHOUT COMPLICATION, WITHOUT LONG-TERM CURRENT USE OF INSULIN (H): ICD-10-CM

## 2023-08-08 DIAGNOSIS — Z12.11 SCREENING FOR COLON CANCER: ICD-10-CM

## 2023-08-08 DIAGNOSIS — E11.65 TYPE 2 DIABETES MELLITUS WITH HYPERGLYCEMIA, UNSPECIFIED WHETHER LONG TERM INSULIN USE (H): Primary | ICD-10-CM

## 2023-08-08 LAB — HBA1C MFR BLD: 6.7 % (ref 0–5.6)

## 2023-08-08 PROCEDURE — 36415 COLL VENOUS BLD VENIPUNCTURE: CPT | Performed by: PHYSICIAN ASSISTANT

## 2023-08-08 PROCEDURE — 99214 OFFICE O/P EST MOD 30 MIN: CPT | Performed by: PHYSICIAN ASSISTANT

## 2023-08-08 PROCEDURE — 83036 HEMOGLOBIN GLYCOSYLATED A1C: CPT | Performed by: PHYSICIAN ASSISTANT

## 2023-08-08 RX ORDER — SEMAGLUTIDE 0.68 MG/ML
0.5 INJECTION, SOLUTION SUBCUTANEOUS
Qty: 3 ML | Refills: 1 | Status: SHIPPED | OUTPATIENT
Start: 2023-08-08 | End: 2023-10-23

## 2023-08-08 ASSESSMENT — PAIN SCALES - GENERAL: PAINLEVEL: NO PAIN (0)

## 2023-08-08 NOTE — PROGRESS NOTES
Assessment & Plan     Type 2 diabetes mellitus with hyperglycemia, unspecified whether long term insulin use (H)  I was able to discuss with Rahel Pako in Diabetes Education. She is going to stop the insulin. Transition to just using the ozempic 0.5 mg along with the metformin.   Continue to monitor her blood sugars regularly.   Plan follow up with Diabetes Education if needed, otherwise I will plan to see her in 6 months.   Expect that she will be slowly getting more active over this time also which will help for her blood sugars.   - Albumin Random Urine Quantitative with Creat Ratio; Future  - HEMOGLOBIN A1C; Future  - HEMOGLOBIN A1C  - Albumin Random Urine Quantitative with Creat Ratio; Future  - Albumin Random Urine Quantitative with Creat Ratio                 Kristen M. Kehr, PA-C [facility]    Sivakumar Weller is a 68 year old, presenting for the following health issues:  Amos Weller is here today for recheck of diabetes.   She has been on basaglar insuline 13 Units along with ozempic and metformin.   The insulin was added after her recent surgeries for her hip and knees.   Concern with her A1C being so high and healing process. The insulin was added short term.   There has been discussion with Diabetes Education about getting off of the insulin once she is on the ozempic 0.5 mg. She is doing well with the ozempic 0.5 mg at this time.     Her A1C is 6.7% today. She has the continuous glucose monitor also.         8/8/2023     9:20 AM   Additional Questions   Roomed by Isidro ROBLES MA   Accompanied by        History of Present Illness       Diabetes:   She presents for follow up of diabetes.   She is checking home blood glucose with a continuous glucose monitor.   She checks blood glucose before and after meals and at bedtime.  Blood glucose is sometimes over 200 and sometimes under 70. She is aware of hypoglycemia symptoms including none.   She is concerned about other.     "She is not experiencing numbness or burning in feet, excessive thirst, blurry vision, weight changes or redness, sores or blisters on feet. The patient has not had a diabetic eye exam in the last 12 months.          Hypertension: She presents for follow up of hypertension.  She does check blood pressure  regularly outside of the clinic. Outpatient blood pressures have not been over 140/90. She does not follow a low salt diet.     She eats 2-3 servings of fruits and vegetables daily.She consumes 0 sweetened beverage(s) daily.She exercises with enough effort to increase her heart rate 10 to 19 minutes per day.  She exercises with enough effort to increase her heart rate 3 or less days per week.   She is taking medications regularly.               Review of Systems   Constitutional, HEENT, cardiovascular, pulmonary, GI, , musculoskeletal, neuro, skin, endocrine and psych systems are negative, except as otherwise noted.      Objective    /76   Pulse 104   Temp 97.8  F (36.6  C) (Tympanic)   Resp 16   Ht 1.651 m (5' 5\")   Wt 60.3 kg (133 lb)   LMP  (LMP Unknown)   SpO2 97%   BMI 22.13 kg/m    Body mass index is 22.13 kg/m .  Physical Exam   GENERAL: healthy, alert and no distress  MS: ambulating without a cane or walker. Slow stable gait  SKIN: no suspicious lesions or rashes  PSYCH: mentation appears normal, affect normal/bright    Results for orders placed or performed in visit on 08/08/23 (from the past 24 hour(s))   HEMOGLOBIN A1C   Result Value Ref Range    Hemoglobin A1C 6.7 (H) 0.0 - 5.6 %                     "

## 2023-08-08 NOTE — NURSING NOTE
"Chief Complaint   Patient presents with    Diabetes       Initial /76   Pulse 104   Temp 97.8  F (36.6  C) (Tympanic)   Resp 16   Ht 1.651 m (5' 5\")   Wt 60.3 kg (133 lb)   LMP  (LMP Unknown)   SpO2 97%   BMI 22.13 kg/m   Estimated body mass index is 22.13 kg/m  as calculated from the following:    Height as of this encounter: 1.651 m (5' 5\").    Weight as of this encounter: 60.3 kg (133 lb).  Medication Reconciliation: complete    ISIDRO Joya MA      "

## 2023-08-09 LAB
CREAT UR-MCNC: 39.9 MG/DL
MICROALBUMIN UR-MCNC: <12 MG/L
MICROALBUMIN/CREAT UR: NORMAL MG/G{CREAT}

## 2023-08-09 PROCEDURE — 82570 ASSAY OF URINE CREATININE: CPT | Performed by: PHYSICIAN ASSISTANT

## 2023-08-09 PROCEDURE — 82043 UR ALBUMIN QUANTITATIVE: CPT | Performed by: PHYSICIAN ASSISTANT

## 2023-09-18 DIAGNOSIS — E11.9 TYPE 2 DIABETES MELLITUS WITHOUT COMPLICATION, WITHOUT LONG-TERM CURRENT USE OF INSULIN (H): ICD-10-CM

## 2023-09-18 RX ORDER — METFORMIN HCL 500 MG
1000 TABLET, EXTENDED RELEASE 24 HR ORAL 2 TIMES DAILY WITH MEALS
Qty: 360 TABLET | Refills: 0 | Status: SHIPPED | OUTPATIENT
Start: 2023-09-18 | End: 2023-12-26

## 2023-10-22 DIAGNOSIS — E11.9 TYPE 2 DIABETES MELLITUS WITHOUT COMPLICATION, WITHOUT LONG-TERM CURRENT USE OF INSULIN (H): ICD-10-CM

## 2023-10-23 RX ORDER — SEMAGLUTIDE 0.68 MG/ML
0.5 INJECTION, SOLUTION SUBCUTANEOUS
Qty: 3 ML | Refills: 0 | Status: SHIPPED | OUTPATIENT
Start: 2023-10-23 | End: 2023-11-24

## 2023-10-26 DIAGNOSIS — E11.9 TYPE 2 DIABETES MELLITUS WITHOUT COMPLICATION, WITHOUT LONG-TERM CURRENT USE OF INSULIN (H): ICD-10-CM

## 2023-10-26 RX ORDER — SEMAGLUTIDE 0.68 MG/ML
0.5 INJECTION, SOLUTION SUBCUTANEOUS
Qty: 3 ML | Refills: 0 | OUTPATIENT
Start: 2023-10-26

## 2023-11-24 DIAGNOSIS — E11.9 TYPE 2 DIABETES MELLITUS WITHOUT COMPLICATION, WITHOUT LONG-TERM CURRENT USE OF INSULIN (H): ICD-10-CM

## 2023-11-24 RX ORDER — SEMAGLUTIDE 0.68 MG/ML
INJECTION, SOLUTION SUBCUTANEOUS
Qty: 3 ML | Refills: 0 | Status: SHIPPED | OUTPATIENT
Start: 2023-11-24 | End: 2023-12-21

## 2023-12-07 ENCOUNTER — TELEPHONE (OUTPATIENT)
Dept: FAMILY MEDICINE | Facility: CLINIC | Age: 68
End: 2023-12-07
Payer: COMMERCIAL

## 2023-12-07 NOTE — TELEPHONE ENCOUNTER
Patient Quality Outreach    Patient is due for the following:   Colon Cancer Screening  Physical Annual Wellness Visit    Next Steps:   Patient was scheduled for 01/17/24 with Kristen Kehr PA-C for an Annual Wellness exam.     Type of outreach:    Chart review performed, no outreach needed.      Questions for provider review:    None           Isidro Joya MA

## 2023-12-21 DIAGNOSIS — E11.9 TYPE 2 DIABETES MELLITUS WITHOUT COMPLICATION, WITHOUT LONG-TERM CURRENT USE OF INSULIN (H): ICD-10-CM

## 2023-12-21 RX ORDER — SEMAGLUTIDE 0.68 MG/ML
INJECTION, SOLUTION SUBCUTANEOUS
Qty: 3 ML | Refills: 0 | Status: SHIPPED | OUTPATIENT
Start: 2023-12-21 | End: 2024-01-17

## 2023-12-23 DIAGNOSIS — E11.9 TYPE 2 DIABETES MELLITUS WITHOUT COMPLICATION, WITHOUT LONG-TERM CURRENT USE OF INSULIN (H): ICD-10-CM

## 2023-12-26 ENCOUNTER — DOCUMENTATION ONLY (OUTPATIENT)
Dept: FAMILY MEDICINE | Facility: CLINIC | Age: 68
End: 2023-12-26
Payer: COMMERCIAL

## 2023-12-26 DIAGNOSIS — E11.9 TYPE 2 DIABETES MELLITUS WITHOUT COMPLICATION, WITHOUT LONG-TERM CURRENT USE OF INSULIN (H): Primary | ICD-10-CM

## 2023-12-26 RX ORDER — METFORMIN HCL 500 MG
1000 TABLET, EXTENDED RELEASE 24 HR ORAL 2 TIMES DAILY WITH MEALS
Qty: 360 TABLET | Refills: 0 | Status: SHIPPED | OUTPATIENT
Start: 2023-12-26 | End: 2024-01-17

## 2023-12-26 NOTE — PROGRESS NOTES
Patient is coming in for Pre-Visist labs on 01.09.24 for you.  The expected date of these orders is not until 02.04.24.  Lab is not able to release those because it is too far out.  Please adjust the expected date on those orders or place new future orders with expected date of 01.09.24.  Thank you.  Libertad Rodriges MLT at [facility]   December 26, 2023

## 2024-01-06 ENCOUNTER — HEALTH MAINTENANCE LETTER (OUTPATIENT)
Age: 69
End: 2024-01-06

## 2024-01-09 ENCOUNTER — LAB (OUTPATIENT)
Dept: LAB | Facility: CLINIC | Age: 69
End: 2024-01-09
Payer: COMMERCIAL

## 2024-01-09 DIAGNOSIS — E11.9 TYPE 2 DIABETES MELLITUS WITHOUT COMPLICATION, WITHOUT LONG-TERM CURRENT USE OF INSULIN (H): ICD-10-CM

## 2024-01-09 LAB
ANION GAP SERPL CALCULATED.3IONS-SCNC: 13 MMOL/L (ref 7–15)
BUN SERPL-MCNC: 23.6 MG/DL (ref 8–23)
CALCIUM SERPL-MCNC: 9.8 MG/DL (ref 8.8–10.2)
CHLORIDE SERPL-SCNC: 100 MMOL/L (ref 98–107)
CHOLEST SERPL-MCNC: 154 MG/DL
CREAT SERPL-MCNC: 0.63 MG/DL (ref 0.51–0.95)
DEPRECATED HCO3 PLAS-SCNC: 25 MMOL/L (ref 22–29)
EGFRCR SERPLBLD CKD-EPI 2021: >90 ML/MIN/1.73M2
FASTING STATUS PATIENT QL REPORTED: YES
GLUCOSE SERPL-MCNC: 128 MG/DL (ref 70–99)
HBA1C MFR BLD: 6.6 % (ref 0–5.6)
HDLC SERPL-MCNC: 61 MG/DL
LDLC SERPL CALC-MCNC: 74 MG/DL
NONHDLC SERPL-MCNC: 93 MG/DL
POTASSIUM SERPL-SCNC: 4.2 MMOL/L (ref 3.4–5.3)
SODIUM SERPL-SCNC: 138 MMOL/L (ref 135–145)
TRIGL SERPL-MCNC: 97 MG/DL

## 2024-01-09 PROCEDURE — 80061 LIPID PANEL: CPT

## 2024-01-09 PROCEDURE — 83036 HEMOGLOBIN GLYCOSYLATED A1C: CPT

## 2024-01-09 PROCEDURE — 36415 COLL VENOUS BLD VENIPUNCTURE: CPT

## 2024-01-09 PROCEDURE — 80048 BASIC METABOLIC PNL TOTAL CA: CPT

## 2024-01-15 ASSESSMENT — ENCOUNTER SYMPTOMS
WEAKNESS: 0
BREAST MASS: 0
MYALGIAS: 0
ARTHRALGIAS: 0
DYSURIA: 0
NERVOUS/ANXIOUS: 0
DIARRHEA: 1
CHILLS: 0
SHORTNESS OF BREATH: 0
HEMATOCHEZIA: 0
NAUSEA: 0
PALPITATIONS: 0
HEADACHES: 0
SORE THROAT: 0
JOINT SWELLING: 0
ABDOMINAL PAIN: 0
CONSTIPATION: 0
FEVER: 0
EYE PAIN: 0
DIZZINESS: 0
HEMATURIA: 0
COUGH: 0
HEARTBURN: 0
FREQUENCY: 0
PARESTHESIAS: 0

## 2024-01-15 ASSESSMENT — ACTIVITIES OF DAILY LIVING (ADL): CURRENT_FUNCTION: NO ASSISTANCE NEEDED

## 2024-01-17 ENCOUNTER — OFFICE VISIT (OUTPATIENT)
Dept: FAMILY MEDICINE | Facility: CLINIC | Age: 69
End: 2024-01-17
Payer: COMMERCIAL

## 2024-01-17 VITALS
DIASTOLIC BLOOD PRESSURE: 64 MMHG | OXYGEN SATURATION: 98 % | WEIGHT: 135 LBS | HEART RATE: 97 BPM | TEMPERATURE: 95.5 F | SYSTOLIC BLOOD PRESSURE: 118 MMHG | BODY MASS INDEX: 22.49 KG/M2 | HEIGHT: 65 IN | RESPIRATION RATE: 16 BRPM

## 2024-01-17 DIAGNOSIS — I10 ESSENTIAL HYPERTENSION: ICD-10-CM

## 2024-01-17 DIAGNOSIS — Z12.31 VISIT FOR SCREENING MAMMOGRAM: ICD-10-CM

## 2024-01-17 DIAGNOSIS — E11.9 TYPE 2 DIABETES MELLITUS WITHOUT COMPLICATION, WITHOUT LONG-TERM CURRENT USE OF INSULIN (H): ICD-10-CM

## 2024-01-17 DIAGNOSIS — M80.00XD OSTEOPOROSIS WITH CURRENT PATHOLOGICAL FRACTURE WITH ROUTINE HEALING, UNSPECIFIED OSTEOPOROSIS TYPE, SUBSEQUENT ENCOUNTER: ICD-10-CM

## 2024-01-17 DIAGNOSIS — Z00.00 ENCOUNTER FOR MEDICARE ANNUAL WELLNESS EXAM: Primary | ICD-10-CM

## 2024-01-17 DIAGNOSIS — Z12.11 SCREEN FOR COLON CANCER: ICD-10-CM

## 2024-01-17 PROBLEM — E11.65 TYPE 2 DIABETES MELLITUS WITH HYPERGLYCEMIA, UNSPECIFIED WHETHER LONG TERM INSULIN USE (H): Status: RESOLVED | Noted: 2023-06-01 | Resolved: 2024-01-17

## 2024-01-17 PROBLEM — E11.65 TYPE 2 DIABETES MELLITUS WITH HYPERGLYCEMIA, UNSPECIFIED WHETHER LONG TERM INSULIN USE (H): Status: ACTIVE | Noted: 2023-06-01

## 2024-01-17 PROBLEM — K59.00 CONSTIPATION, UNSPECIFIED CONSTIPATION TYPE: Status: RESOLVED | Noted: 2023-06-01 | Resolved: 2024-01-17

## 2024-01-17 PROCEDURE — G0438 PPPS, INITIAL VISIT: HCPCS | Performed by: PHYSICIAN ASSISTANT

## 2024-01-17 PROCEDURE — G0009 ADMIN PNEUMOCOCCAL VACCINE: HCPCS | Performed by: PHYSICIAN ASSISTANT

## 2024-01-17 PROCEDURE — 90677 PCV20 VACCINE IM: CPT | Performed by: PHYSICIAN ASSISTANT

## 2024-01-17 PROCEDURE — 99214 OFFICE O/P EST MOD 30 MIN: CPT | Mod: 25 | Performed by: PHYSICIAN ASSISTANT

## 2024-01-17 RX ORDER — SEMAGLUTIDE 0.68 MG/ML
INJECTION, SOLUTION SUBCUTANEOUS
Qty: 3 ML | Refills: 4 | Status: SHIPPED | OUTPATIENT
Start: 2024-01-17 | End: 2024-05-24

## 2024-01-17 RX ORDER — ALENDRONATE SODIUM 70 MG/1
70 TABLET ORAL
Qty: 12 TABLET | Refills: 3 | Status: SHIPPED | OUTPATIENT
Start: 2024-01-17

## 2024-01-17 RX ORDER — METFORMIN HCL 500 MG
1000 TABLET, EXTENDED RELEASE 24 HR ORAL 2 TIMES DAILY WITH MEALS
Qty: 360 TABLET | Refills: 3 | Status: SHIPPED | OUTPATIENT
Start: 2024-01-17

## 2024-01-17 RX ORDER — LISINOPRIL 2.5 MG/1
2.5 TABLET ORAL DAILY
Qty: 90 TABLET | Refills: 3 | Status: SHIPPED | OUTPATIENT
Start: 2024-01-17

## 2024-01-17 RX ORDER — ATORVASTATIN CALCIUM 20 MG/1
20 TABLET, FILM COATED ORAL DAILY
Qty: 90 TABLET | Refills: 3 | Status: SHIPPED | OUTPATIENT
Start: 2024-01-17

## 2024-01-17 ASSESSMENT — ENCOUNTER SYMPTOMS
NAUSEA: 0
FREQUENCY: 0
DIARRHEA: 1
SHORTNESS OF BREATH: 0
CHILLS: 0
CONSTIPATION: 0
HEADACHES: 0
HEMATURIA: 0
NERVOUS/ANXIOUS: 0
EYE PAIN: 0
SORE THROAT: 0
MYALGIAS: 0
ABDOMINAL PAIN: 0
FEVER: 0
COUGH: 0
DIZZINESS: 0
WEAKNESS: 0
PALPITATIONS: 0
JOINT SWELLING: 0
ARTHRALGIAS: 0
DYSURIA: 0

## 2024-01-17 ASSESSMENT — ACTIVITIES OF DAILY LIVING (ADL): CURRENT_FUNCTION: NO ASSISTANCE NEEDED

## 2024-01-17 ASSESSMENT — PAIN SCALES - GENERAL: PAINLEVEL: NO PAIN (1)

## 2024-01-17 NOTE — PATIENT INSTRUCTIONS
Patient Education   Personalized Prevention Plan  You are due for the preventive services outlined below.  Your care team is available to assist you in scheduling these services.  If you have already completed any of these items, please share that information with your care team to update in your medical record.  Health Maintenance Due   Topic Date Due     Diabetic Foot Exam  Never done     ANNUAL REVIEW OF HM ORDERS  Never done     Pneumococcal Vaccine (1 of 2 - PCV) Never done     Colorectal Cancer Screening  Never done     Zoster (Shingles) Vaccine (1 of 2) Never done     LUNG CANCER SCREENING  Never done     RSV VACCINE (Pregnancy & 60+) (1 - 1-dose 60+ series) Never done     Eye Exam  09/04/2019     Annual Wellness Visit  11/30/2023     PHQ-2 (once per calendar year)  01/01/2024

## 2024-01-17 NOTE — PROGRESS NOTES
"Preventive Care Visit  [facility] [place] Kristen M. Kehr, PA-C, Family Medicine  Jan 17, 2024      SUBJECTIVE:   Janee is a 68 year old, presenting for the following:  Wellness Visit        1/17/2024     9:09 AM   Additional Questions   Roomed by Isidro ROBLES MA     Are you in the first 12 months of your Medicare coverage?  No    Healthy Habits:     In general, how would you rate your overall health?  Good    Frequency of exercise:  4-5 days/week    Duration of exercise:  Less than 15 minutes    Do you usually eat at least 4 servings of fruit and vegetables a day, include whole grains    & fiber and avoid regularly eating high fat or \"junk\" foods?  No    Taking medications regularly:  Yes    Medication side effects:  None    Ability to successfully perform activities of daily living:  No assistance needed    Home Safety:  No safety concerns identified    Hearing Impairment:  No hearing concerns    In the past 6 months, have you been bothered by leaking of urine?  No    In general, how would you rate your overall mental or emotional health?  Good    Additional concerns today:  Yes    PROBLEMS TO ADD:  1. Type 2 Diabetes: well controlled, managed with ozempic 0.5 mg weekly and the metformin.   2. Osteoporosis: DEXA in 2022/ fracture also. She is taking the fosamax and the calcium. She has been taking the fosamax for 6 months. Due for DEXA again 2 years after starting the medication. She reports no side effects.   3. Hypertension: managed with lisinopril       Have you ever done Advance Care Planning? (For example, a Health Directive, POLST, or a discussion with a medical provider or your loved ones about your wishes): Yes, advance care planning is on file.       Fall risk  Fallen 2 or more times in the past year?: Yes  Any fall with injury in the past year?: Yes    Cognitive Screening Cognitive Screening Cognition normal during direct observation during interview and exam          Reviewed and updated as " needed this visit by clinical staff                  Reviewed and updated as needed this visit by Provider                  Social History     Tobacco Use     Smoking status: Former     Packs/day: 0.50     Years: 39.00     Additional pack years: 0.00     Total pack years: 19.50     Types: Cigarettes     Smokeless tobacco: Never   Substance Use Topics     Alcohol use: Yes     Comment: occ.             1/15/2024    10:28 AM   Alcohol Use   Prescreen: >3 drinks/day or >7 drinks/week? No     Do you have a current opioid prescription? No  Do you use any other controlled substances or medications that are not prescribed by a provider? None            Current providers sharing in care for this patient include:   Patient Care Team:  Kehr, Kristen M, PA-C as PCP - General (Family Medicine)  Kehr, Kristen M, PA-C as Referring Physician (Family Medicine)  Kurtis Jarrell MD as MD (Dermatology)  Kehr, Kristen M, PA-C as Assigned PCP  Alondra Bains PA-C as Assigned Surgical Provider  Ellen Min RN as Diabetes Educator (Diabetes Education)  Noman Hernández MD as Assigned Musculoskeletal Provider    The following health maintenance items are reviewed in Epic and correct as of today:  Health Maintenance   Topic Date Due     DIABETIC FOOT EXAM  Never done     ANNUAL REVIEW OF HM ORDERS  Never done     Pneumococcal Vaccine: 65+ Years (1 of 2 - PCV) Never done     COLORECTAL CANCER SCREENING  Never done     ZOSTER IMMUNIZATION (1 of 2) Never done     LUNG CANCER SCREENING  Never done     RSV VACCINE (Pregnancy & 60+) (1 - 1-dose 60+ series) Never done     EYE EXAM  09/04/2019     MEDICARE ANNUAL WELLNESS VISIT  11/30/2023     PHQ-2 (once per calendar year)  01/01/2024     A1C  04/09/2024     MICROALBUMIN  08/09/2024     MAMMO SCREENING  12/29/2024     BMP  01/09/2025     LIPID  01/09/2025     FALL RISK ASSESSMENT  01/17/2025     DTAP/TDAP/TD IMMUNIZATION (2 - Td or Tdap) 10/26/2027     ADVANCE CARE PLANNING   04/27/2028     DEXA  12/14/2037     HEPATITIS C SCREENING  Completed     INFLUENZA VACCINE  Completed     COVID-19 Vaccine  Completed     IPV IMMUNIZATION  Aged Out     HPV IMMUNIZATION  Aged Out     MENINGITIS IMMUNIZATION  Aged Out     RSV MONOCLONAL ANTIBODY  Aged Out     BP Readings from Last 3 Encounters:   01/17/24 118/64   08/08/23 118/76   06/29/23 101/61    Wt Readings from Last 3 Encounters:   01/17/24 61.2 kg (135 lb)   08/08/23 60.3 kg (133 lb)   06/01/23 61.7 kg (136 lb)                  Patient Active Problem List   Diagnosis     CARDIOVASCULAR SCREENING; LDL GOAL LESS THAN 130     Other closed fracture of patella with routine healing, unspecified laterality, subsequent encounter     Essential hypertension     Closed displaced intertrochanteric fracture of left femur (H)     Type 2 diabetes mellitus without complication, without long-term current use of insulin (H)     Osteoporosis with current pathological fracture with routine healing, unspecified osteoporosis type, subsequent encounter     Past Surgical History:   Procedure Laterality Date     BIOPSY  November, 2022     OPEN REDUCTION INTERNAL FIXATION PATELLA Bilateral 4/14/2023    Procedure: OPEN REDUCTION INTERNAL FIXATION,BILATERAL PATELLA;  Surgeon: Noman Hernández MD;  Location:  OR     ORTHOPEDIC SURGERY  2010     SURGICAL HISTORY OF -       left shoulder torn rotator     TONSILLECTOMY         Social History     Tobacco Use     Smoking status: Former     Packs/day: 0.50     Years: 39.00     Additional pack years: 0.00     Total pack years: 19.50     Types: Cigarettes     Smokeless tobacco: Never   Substance Use Topics     Alcohol use: Yes     Comment: occ.     Family History   Problem Relation Age of Onset     Diabetes Mother      Heart Disease Mother      Hypertension Mother      Cancer Mother      Glaucoma Mother 80        drops     Macular Degeneration Mother         loss of vison     Depression Mother      Diabetes Father       Hypertension Father      Cancer Father      Diabetes Sister      Cerebrovascular Disease No family hx of      Thyroid Disease No family hx of          Current Outpatient Medications   Medication Sig Dispense Refill     alendronate (FOSAMAX) 70 MG tablet Take 1 tablet (70 mg) by mouth every 7 days 12 tablet 3     atorvastatin (LIPITOR) 20 MG tablet Take 1 tablet (20 mg) by mouth daily 90 tablet 3     blood glucose (ACCU-CHEK GUIDE) test strip Use to test blood sugar 2 times daily or as directed. 200 strip 11     blood glucose monitoring (SOFTCLIX) lancets 1 each 2 times daily Use to test blood sugar 2 times daily or as directed. 100 each 11     Calcium Carb-Cholecalciferol 600-12.5 MG-MCG CAPS        clobetasol (TEMOVATE) 0.05 % external ointment Apply topically BID to the AA on the legs, do not use over open skin. Repeat daily for 2 weeks. Then use intermittently thereafter. Avoid use on the face, neck, groin, and underarms. 60 g 1     Continuous Blood Gluc Sensor (FREESTYLE LANDON 3 SENSOR) MISC 1 Box. continuous Change out every 14 days 6 each 11     gentamicin (GARAMYCIN) 0.1 % external ointment Apply topically 3 times daily 30 g 1     ibuprofen (ADVIL/MOTRIN) 600 MG tablet Take 600 mg by mouth every 6 hours as needed for moderate pain PRN       lisinopril (ZESTRIL) 2.5 MG tablet Take 1 tablet (2.5 mg) by mouth daily 90 tablet 3     metFORMIN (GLUCOPHAGE XR) 500 MG 24 hr tablet Take 2 tablets (1,000 mg) by mouth 2 times daily (with meals) 360 tablet 3     semaglutide (OZEMPIC, 0.25 OR 0.5 MG/DOSE,) 2 MG/3ML pen Inject 0.5 mg under the skin every 7 days 3 mL 4     STATIN NOT PRESCRIBED (INTENTIONAL) Please choose reason not prescribed from choices below.       acetaminophen (TYLENOL) 325 MG tablet Take 2 tablets (650 mg) by mouth every 4 hours as needed for mild pain (Patient taking differently: Take 650 mg by mouth every 4 hours as needed for mild pain PRN) 50 tablet 0     Allergies   Allergen Reactions      "Betamethasone Dipropionate (Augmented) [Betamethasone]      Amoxicillin-Pot Clavulanate Itching and Rash     redness     Recent Labs   Lab Test 01/09/24  0741 08/08/23  0931 04/12/23  1651 01/30/23  0940 01/11/23  0656   A1C 6.6* 6.7* 9.0*   < >  --    LDL 74  --   --   --  89   HDL 61  --   --   --  58   TRIG 97  --   --   --  106   CR 0.63  --  0.80  --  0.64   GFRESTIMATED >90  --  80  --  >90   POTASSIUM 4.2  --  5.0  --  4.1   TSH  --   --   --   --  2.39    < > = values in this interval not displayed.              11/27/2022     6:36 PM 12/14/2022    10:15 AM 12/29/2022     3:38 PM   Breast CA Risk Assessment (FHS-7)   Do you have a family history of breast, colon, or ovarian cancer? No / Unknown No / Unknown No / Unknown         Mammogram Screening: Recommended mammography every 1-2 years with patient discussion and risk factor consideration  Pertinent mammograms are reviewed under the imaging tab.      OBJECTIVE:   LMP  (LMP Unknown)    Estimated body mass index is 22.13 kg/m  as calculated from the following:    Height as of 8/8/23: 1.651 m (5' 5\").    Weight as of 8/8/23: 60.3 kg (133 lb).  Review of Systems   Constitutional:  Negative for chills and fever.   HENT:  Negative for congestion, ear pain, hearing loss and sore throat.    Eyes:  Negative for pain and visual disturbance.   Respiratory:  Negative for cough and shortness of breath.    Cardiovascular:  Negative for chest pain and palpitations.   Gastrointestinal:  Positive for diarrhea. Negative for abdominal pain, constipation and nausea.   Genitourinary:  Negative for dysuria, frequency, genital sores, hematuria, pelvic pain, urgency, vaginal bleeding and vaginal discharge.   Musculoskeletal:  Negative for arthralgias, joint swelling and myalgias.   Skin:  Negative for rash.   Neurological:  Negative for dizziness, weakness and headaches.   Psychiatric/Behavioral:  The patient is not nervous/anxious.         Physical Exam  GENERAL: alert and no " distress  EYES: Eyes grossly normal to inspection, PERRL and conjunctivae and sclerae normal  HENT: ear canals and TM's normal, nose and mouth without ulcers or lesions  NECK: no adenopathy, no asymmetry, masses, or scars  RESP: lungs clear to auscultation - no rales, rhonchi or wheezes  CV: regular rate and rhythm, normal S1 S2, no S3 or S4, no murmur, click or rub, no peripheral edema  ABDOMEN: soft, nontender, no hepatosplenomegaly, no masses and bowel sounds normal  MS: no gross musculoskeletal defects noted, no edema  SKIN: no suspicious lesions or rashes  NEURO: Normal strength and tone, mentation intact and speech normal  PSYCH: mentation appears normal, affect normal/bright  LYMPH: no cervical, supraclavicular, axillary, or inguinal adenopathy    Diagnostic Test Results:  Recent Results (from the past 240 hour(s))   Hemoglobin A1c    Collection Time: 01/09/24  7:41 AM   Result Value Ref Range    Hemoglobin A1C 6.6 (H) 0.0 - 5.6 %   Basic metabolic panel  (Ca, Cl, CO2, Creat, Gluc, K, Na, BUN)    Collection Time: 01/09/24  7:41 AM   Result Value Ref Range    Sodium 138 135 - 145 mmol/L    Potassium 4.2 3.4 - 5.3 mmol/L    Chloride 100 98 - 107 mmol/L    Carbon Dioxide (CO2) 25 22 - 29 mmol/L    Anion Gap 13 7 - 15 mmol/L    Urea Nitrogen 23.6 (H) 8.0 - 23.0 mg/dL    Creatinine 0.63 0.51 - 0.95 mg/dL    GFR Estimate >90 >60 mL/min/1.73m2    Calcium 9.8 8.8 - 10.2 mg/dL    Glucose 128 (H) 70 - 99 mg/dL   Lipid panel reflex to direct LDL Fasting    Collection Time: 01/09/24  7:41 AM   Result Value Ref Range    Cholesterol 154 <200 mg/dL    Triglycerides 97 <150 mg/dL    Direct Measure HDL 61 >=50 mg/dL    LDL Cholesterol Calculated 74 <=100 mg/dL    Non HDL Cholesterol 93 <130 mg/dL    Patient Fasting > 8hrs? Yes          ASSESSMENT / PLAN:   Encounter for Medicare annual wellness exam  Health maintenance reviewed and updated.    Type 2 diabetes mellitus without complication, without long-term current use of  insulin (H)  Well controlled with her current medications.   She will also add the atorvastatin. Discussed cardiac risk and prevention.   Side effects and how to take the medication discussed.  Plan to have lab tests again in 6 months  Office visit in 1 year if A1C at goal.   She will schedule her eye exam  - metFORMIN (GLUCOPHAGE XR) 500 MG 24 hr tablet; Take 2 tablets (1,000 mg) by mouth 2 times daily (with meals)  - semaglutide (OZEMPIC, 0.25 OR 0.5 MG/DOSE,) 2 MG/3ML pen; Inject 0.5 mg under the skin every 7 days  - atorvastatin (LIPITOR) 20 MG tablet; Take 1 tablet (20 mg) by mouth daily    Osteoporosis with current pathological fracture with routine healing, unspecified osteoporosis type, subsequent encounter  Tolerating the fosamax.   She has been on this since late 2023.   Plan for DEXA again in 2025/26   - alendronate (FOSAMAX) 70 MG tablet; Take 1 tablet (70 mg) by mouth every 7 days    Essential hypertension  stable  - lisinopril (ZESTRIL) 2.5 MG tablet; Take 1 tablet (2.5 mg) by mouth daily    Visit for screening mammogram  - *MA Screening Digital Bilateral; Future    Screen for colon cancer  - Colonoscopy Screening  Referral; Future          Counseling  Reviewed preventive health counseling, as reflected in patient instructions       Regular exercise       Healthy diet/nutrition       Fall risk prevention       Immunizations  Vaccinated for: Pneumococcal , will plan to get others at pharmacy         Osteoporosis prevention/bone health        She reports that she has quit smoking. Her smoking use included cigarettes. She has a 19.5 pack-year smoking history. She has never used smokeless tobacco.      Appropriate preventive services were discussed with this patient, including applicable screening as appropriate for fall prevention, nutrition, physical activity, Tobacco-use cessation, weight loss and cognition.  Checklist reviewing preventive services available has been given to the  patient.    Reviewed patients plan of care and provided an AVS. The Basic Care Plan (routine screening as documented in Health Maintenance) for [place] meets the Care Plan requirement. This Care Plan has been established and reviewed with the Patient.          Signed Electronically by: Kristen M. Kehr, PA-C    Identified Health Risks  I have reviewed Opioid Use Disorder and Substance Use Disorder risk factors and made any needed referrals.

## 2024-01-22 ENCOUNTER — MYC MEDICAL ADVICE (OUTPATIENT)
Dept: FAMILY MEDICINE | Facility: CLINIC | Age: 69
End: 2024-01-22
Payer: COMMERCIAL

## 2024-01-22 NOTE — TELEPHONE ENCOUNTER
Clarification on atorvastatin dose. Patient currently prescribed atorvastatin (LIPITOR) 20 MG tablet.     Bev Mary RN

## 2024-01-23 ENCOUNTER — MEDICAL CORRESPONDENCE (OUTPATIENT)
Dept: HEALTH INFORMATION MANAGEMENT | Facility: CLINIC | Age: 69
End: 2024-01-23
Payer: COMMERCIAL

## 2024-04-04 ENCOUNTER — MYC MEDICAL ADVICE (OUTPATIENT)
Dept: EDUCATION SERVICES | Facility: CLINIC | Age: 69
End: 2024-04-04
Payer: COMMERCIAL

## 2024-05-24 DIAGNOSIS — E11.9 TYPE 2 DIABETES MELLITUS WITHOUT COMPLICATION, WITHOUT LONG-TERM CURRENT USE OF INSULIN (H): ICD-10-CM

## 2024-05-24 RX ORDER — SEMAGLUTIDE 0.68 MG/ML
INJECTION, SOLUTION SUBCUTANEOUS
Qty: 9 ML | Refills: 0 | Status: SHIPPED | OUTPATIENT
Start: 2024-05-24 | End: 2024-08-26

## 2024-05-25 ENCOUNTER — HEALTH MAINTENANCE LETTER (OUTPATIENT)
Age: 69
End: 2024-05-25

## 2024-07-17 ENCOUNTER — LAB (OUTPATIENT)
Dept: LAB | Facility: CLINIC | Age: 69
End: 2024-07-17
Payer: COMMERCIAL

## 2024-07-17 LAB
ALBUMIN SERPL BCG-MCNC: 4.3 G/DL (ref 3.5–5.2)
ALP SERPL-CCNC: 68 U/L (ref 40–150)
ALT SERPL W P-5'-P-CCNC: 17 U/L (ref 0–50)
ANION GAP SERPL CALCULATED.3IONS-SCNC: 10 MMOL/L (ref 7–15)
AST SERPL W P-5'-P-CCNC: 20 U/L (ref 0–45)
BILIRUB SERPL-MCNC: 0.4 MG/DL
BUN SERPL-MCNC: 18.4 MG/DL (ref 8–23)
CALCIUM SERPL-MCNC: 9.3 MG/DL (ref 8.8–10.4)
CHLORIDE SERPL-SCNC: 102 MMOL/L (ref 98–107)
CREAT SERPL-MCNC: 0.67 MG/DL (ref 0.51–0.95)
CREAT UR-MCNC: 140 MG/DL
EGFRCR SERPLBLD CKD-EPI 2021: >90 ML/MIN/1.73M2
GLUCOSE SERPL-MCNC: 171 MG/DL (ref 70–99)
HBA1C MFR BLD: 7 % (ref 0–5.6)
HCO3 SERPL-SCNC: 28 MMOL/L (ref 22–29)
MICROALBUMIN UR-MCNC: 43.7 MG/L
MICROALBUMIN/CREAT UR: 31.21 MG/G CR (ref 0–25)
POTASSIUM SERPL-SCNC: 4.2 MMOL/L (ref 3.4–5.3)
PROT SERPL-MCNC: 6.9 G/DL (ref 6.4–8.3)
SODIUM SERPL-SCNC: 140 MMOL/L (ref 135–145)

## 2024-07-17 PROCEDURE — 83036 HEMOGLOBIN GLYCOSYLATED A1C: CPT

## 2024-07-17 PROCEDURE — 36415 COLL VENOUS BLD VENIPUNCTURE: CPT

## 2024-07-17 PROCEDURE — 80053 COMPREHEN METABOLIC PANEL: CPT

## 2024-07-17 PROCEDURE — 82570 ASSAY OF URINE CREATININE: CPT

## 2024-07-17 PROCEDURE — 82043 UR ALBUMIN QUANTITATIVE: CPT

## 2024-08-13 ENCOUNTER — MEDICAL CORRESPONDENCE (OUTPATIENT)
Dept: HEALTH INFORMATION MANAGEMENT | Facility: CLINIC | Age: 69
End: 2024-08-13
Payer: COMMERCIAL

## 2024-08-26 ENCOUNTER — MYC REFILL (OUTPATIENT)
Dept: FAMILY MEDICINE | Facility: CLINIC | Age: 69
End: 2024-08-26
Payer: COMMERCIAL

## 2024-08-26 DIAGNOSIS — E11.9 TYPE 2 DIABETES MELLITUS WITHOUT COMPLICATION, WITHOUT LONG-TERM CURRENT USE OF INSULIN (H): ICD-10-CM

## 2024-08-27 RX ORDER — SEMAGLUTIDE 0.68 MG/ML
INJECTION, SOLUTION SUBCUTANEOUS
Qty: 9 ML | Refills: 0 | Status: SHIPPED | OUTPATIENT
Start: 2024-08-27

## 2024-10-10 ENCOUNTER — MYC MEDICAL ADVICE (OUTPATIENT)
Dept: FAMILY MEDICINE | Facility: CLINIC | Age: 69
End: 2024-10-10
Payer: COMMERCIAL

## 2024-10-14 NOTE — TELEPHONE ENCOUNTER
No tests needed prior to appointment.   She can get an A1c completed at the appointment.   Kristen Kehr PA-C

## 2024-10-21 ENCOUNTER — TELEPHONE (OUTPATIENT)
Dept: GASTROENTEROLOGY | Facility: CLINIC | Age: 69
End: 2024-10-21
Payer: COMMERCIAL

## 2024-10-21 NOTE — TELEPHONE ENCOUNTER
"Endoscopy Scheduling Screen    Have you had any respiratory illness or flu-like symptoms in the last 10 days?  No    What is your communication preference for Instructions and/or Bowel Prep?   MyChart    What insurance is in the chart?  Other:  [facility] MEDICARE    Ordering/Referring Provider:   KEHR, KRISTEN M        (If ordering provider performs procedure, schedule with ordering provider unless otherwise instructed. )    BMI: Estimated body mass index is 22.29 kg/m  as calculated from the following:    Height as of 1/17/24: 1.657 m (5' 5.25\").    Weight as of 1/17/24: 61.2 kg (135 lb).     Sedation Ordered  moderate sedation.   If patient BMI > 50 do not schedule in [facility].    If patient BMI > 45 do not schedule at [facility].    Are you taking methadone or Suboxone?  NO, No RN review required.    Have you been diagnosed and are being treated for severe PTSD or severe anxiety?  NO, No RN review required.    Are you taking any prescription medications for pain 3 or more times per week?   NO, No RN review required.    Do you have a history of malignant hyperthermia?  No    (Females) Are you currently pregnant?   No     Have you been diagnosed or told you have pulmonary hypertension?   No    Do you have an LVAD?  No    Have you been told you have moderate to severe sleep apnea?  No.    Have you been told you have COPD, asthma, or any other lung disease?  No    Do you have any heart conditions?  No     Have you ever had or are you waiting for an organ transplant?  No. Continue scheduling, no site restrictions.    Have you had a stroke or transient ischemic attack (TIA aka \"mini stroke\" in the last 6 months?   No    Have you been diagnosed with or been told you have cirrhosis of the liver?   No.    Are you currently on dialysis?   No    Do you need assistance transferring?   No    BMI: Estimated body mass index is 22.29 kg/m  as calculated from the following:    Height as of 1/17/24: 1.657 m (5' 5.25\").    Weight as of 1/17/24: " 61.2 kg (135 lb).     Is patients BMI > 40 and scheduling location [facility]?  No    Do you take an injectable or oral medication for weight loss or diabetes (excluding insulin)?  Yes, hold time can be up to 7 days. Please consult with you prescribing provider to discuss endoscopy recommendations. (Please schedule at least 7 days out.)    Do you take the medication Naltrexone?  No    Do you take blood thinners?  No       Prep   Are you currently on dialysis or do you have chronic kidney disease?  No    Do you have a diagnosis of diabetes?  Yes (Golytely Prep)    Do you have a diagnosis of cystic fibrosis (CF)?  No    On a regular basis do you go 3 -5 days between bowel movements?  No    BMI > 40?  No    Preferred Pharmacy:    [id number] - [place] - [address]  [address] [address] [place] [address]  Phone: [phone] Fax: [phone]    Final Scheduling Details     Procedure scheduled  Colonoscopy    Surgeon:  HUSEYIN     Date of procedure:  12/03/2024     Pre-OP / PAC:   No - Not required for this site.    Location  [facility] - [facility] - Patient preference.    Sedation   Moderate Sedation - Per order.      Patient Reminders:   You will receive a call from a Nurse to review instructions and health history.  This assessment must be completed prior to your procedure.  Failure to complete the Nurse assessment may result in the procedure being cancelled.      On the day of your procedure, please designate an adult(s) who can drive you home stay with you for the next 24 hours. The medicines used in the exam will make you sleepy. You will not be able to drive.      You cannot take public transportation, ride share services, or non-medical taxi service without a responsible caregiver.  Medical transport services are allowed with the requirement that a responsible caregiver will receive you at your destination.  We require that drivers and caregivers are confirmed prior to your procedure.

## 2024-10-23 ENCOUNTER — TELEPHONE (OUTPATIENT)
Dept: FAMILY MEDICINE | Facility: CLINIC | Age: 69
End: 2024-10-23

## 2024-10-23 ENCOUNTER — OFFICE VISIT (OUTPATIENT)
Dept: FAMILY MEDICINE | Facility: CLINIC | Age: 69
End: 2024-10-23
Payer: COMMERCIAL

## 2024-10-23 VITALS
RESPIRATION RATE: 16 BRPM | DIASTOLIC BLOOD PRESSURE: 80 MMHG | HEART RATE: 95 BPM | OXYGEN SATURATION: 98 % | TEMPERATURE: 97.4 F | WEIGHT: 142 LBS | SYSTOLIC BLOOD PRESSURE: 125 MMHG | BODY MASS INDEX: 23.66 KG/M2 | HEIGHT: 65 IN

## 2024-10-23 DIAGNOSIS — E11.9 TYPE 2 DIABETES MELLITUS WITHOUT COMPLICATION, WITHOUT LONG-TERM CURRENT USE OF INSULIN (H): Primary | ICD-10-CM

## 2024-10-23 PROCEDURE — 99213 OFFICE O/P EST LOW 20 MIN: CPT | Performed by: PHYSICIAN ASSISTANT

## 2024-10-23 RX ORDER — ACYCLOVIR 800 MG/1
1 TABLET ORAL CONTINUOUS
Qty: 6 EACH | Refills: 11 | Status: SHIPPED | OUTPATIENT
Start: 2024-10-23

## 2024-10-23 ASSESSMENT — PAIN SCALES - GENERAL: PAINLEVEL_OUTOF10: NO PAIN (0)

## 2024-10-23 NOTE — TELEPHONE ENCOUNTER
PRIOR AUTHORIZATION DENIED    Medication: FREESTYLE LANDON 3 SENSOR [facility]  Insurance Company: [employer] Part [employer] - Phone [phone] Fax [phone]  Denial Date: 10/23/2024  Denial Reason(s): Needs to be on daily insulin and have history of problematic hypoglycemia      Appeal Information:   Patient Notified: No

## 2024-10-23 NOTE — PROGRESS NOTES
Assessment & Plan     Type 2 diabetes mellitus without complication, without long-term current use of insulin (H)  She has all medication refills through next year.   Refill of the FreeStyle Pilar sensor sent to her diabetes supply provider.   I will continue to see her regularly with her next appointment in January.   - Continuous Glucose Sensor (FREESTYLE PILAR 3 SENSOR) MISC; 1 Box. continuously. Change out every 14 days        Blood sugar testing frequency justification:  Risk of hypoglycemia with medication(s)        Subjective   Janee is a 69 year old, presenting for the following health issues:  Diabetes        10/23/2024    11:10 AM   Additional Questions   Roomed by Isidro ROBLES MA         10/23/2024   Forms   Any forms needing to be completed Yes        History of Present Illness       Diabetes:   She presents for follow up of diabetes.  She is checking home blood glucose four or more times daily.   She checks blood glucose before and after meals.  Blood glucose is sometimes over 200 and sometimes under 70.  When her blood glucose is low, the patient is asymptomatic for confusion, blurred vision, lethargy and reports not feeling dizzy, shaky, or weak.  She is concerned about other.   She is having numbness in feet.  The patient has not had a diabetic eye exam in the last 12 months.          She eats 0-1 servings of fruits and vegetables daily.She consumes 0 sweetened beverage(s) daily.She exercises with enough effort to increase her heart rate 10 to 19 minutes per day.  She exercises with enough effort to increase her heart rate 4 days per week.   She is taking medications regularly.      Janee is here today per the request of her diabetes supply company.   She uses a Pilar 3 continuous glucose monitor and was told she needed to be seen in order update her prescription.   She was seen in July and A1C was 7.0% at that time.   She is using the ozempic 0.5 mg and metformin 2000 mg daily.   She does well with the  "sensor and it will help to identify low readings since she does not always have symptoms.                 Review of Systems  Constitutional, HEENT, cardiovascular, pulmonary, GI, , musculoskeletal, neuro, skin, endocrine and psych systems are negative, except as otherwise noted.      Objective    /80   Pulse 95   Temp 97.4  F (36.3  C) (Tympanic)   Resp 16   Ht 1.657 m (5' 5.25\")   Wt 64.4 kg (142 lb)   LMP  (LMP Unknown)   SpO2 98%   Breastfeeding No   BMI 23.45 kg/m    Body mass index is 23.45 kg/m .  Physical Exam   GENERAL: alert and no distress  MS: no gross musculoskeletal defects noted, no edema  SKIN: no suspicious lesions or rashes  PSYCH: mentation appears normal, affect normal/bright    Lab on 07/17/2024   Component Date Value Ref Range Status    Hemoglobin A1C 07/17/2024 7.0 (H)  0.0 - 5.6 % Final    Normal <5.7%   Prediabetes 5.7-6.4%    Diabetes 6.5% or higher     Note: Adopted from ADA consensus guidelines.    Creatinine Urine mg/dL 07/17/2024 140.0  mg/dL Final    The reference ranges have not been established in urine creatinine. The results should be integrated into the clinical context for interpretation.    Albumin Urine mg/L 07/17/2024 43.7  mg/L Final    The reference ranges have not been established in urine albumin. The results should be integrated into the clinical context for interpretation.    Albumin Urine mg/g Cr 07/17/2024 31.21 (H)  0.00 - 25.00 mg/g Cr Final    Microalbuminuria is defined as an albumin:creatinine ratio of 17 to 299 for males and 25 to 299 for females. A ratio of albumin:creatinine of 300 or higher is indicative of overt proteinuria.  Due to biologic variability, positive results should be confirmed by a second, first-morning random or 24-hour timed urine specimen. If there is discrepancy, a third specimen is recommended. When 2 out of 3 results are in the microalbuminuria range, this is evidence for incipient nephropathy and warrants increased efforts " at glucose control, blood pressure control, and institution of therapy with an angiotensin-converting-enzyme (ACE) inhibitor (if the patient can tolerate it).      Sodium 07/17/2024 140  135 - 145 mmol/L Final    Potassium 07/17/2024 4.2  3.4 - 5.3 mmol/L Final    Carbon Dioxide (CO2) 07/17/2024 28  22 - 29 mmol/L Final    Anion Gap 07/17/2024 10  7 - 15 mmol/L Final    Urea Nitrogen 07/17/2024 18.4  8.0 - 23.0 mg/dL Final    Creatinine 07/17/2024 0.67  0.51 - 0.95 mg/dL Final    GFR Estimate 07/17/2024 >90  >60 mL/min/1.73m2 Final    eGFR calculated using 2021 CKD-EPI equation.    Calcium 07/17/2024 9.3  8.8 - 10.4 mg/dL Final    Reference intervals for this test were updated on 7/16/2024 to reflect our healthy population more accurately. There may be differences in the flagging of prior results with similar values performed with this method. Those prior results can be interpreted in the context of the updated reference intervals.    Chloride 07/17/2024 102  98 - 107 mmol/L Final    Glucose 07/17/2024 171 (H)  70 - 99 mg/dL Final    Alkaline Phosphatase 07/17/2024 68  40 - 150 U/L Final    AST 07/17/2024 20  0 - 45 U/L Final    ALT 07/17/2024 17  0 - 50 U/L Final    Protein Total 07/17/2024 6.9  6.4 - 8.3 g/dL Final    Albumin 07/17/2024 4.3  3.5 - 5.2 g/dL Final    Bilirubin Total 07/17/2024 0.4  <=1.2 mg/dL Final           Signed Electronically by: Kristen M. Kehr, PA-C

## 2024-11-14 NOTE — TELEPHONE ENCOUNTER
Extended Golytely Bowel Prep  recommended due to GLP-1 agonist medication noted in chart.  Instructions were sent via [place]. Bowel prep was sent 11/14/2024 to    [id number] - [place], [place] - [id number] [address], RN  Endoscopy Procedure Pre Assessment

## 2024-11-16 DIAGNOSIS — E11.9 TYPE 2 DIABETES MELLITUS WITHOUT COMPLICATION, WITHOUT LONG-TERM CURRENT USE OF INSULIN (H): ICD-10-CM

## 2024-11-18 RX ORDER — SEMAGLUTIDE 0.68 MG/ML
INJECTION, SOLUTION SUBCUTANEOUS
Qty: 9 ML | Refills: 0 | Status: SHIPPED | OUTPATIENT
Start: 2024-11-18

## 2024-11-20 ENCOUNTER — TELEPHONE (OUTPATIENT)
Dept: GASTROENTEROLOGY | Facility: CLINIC | Age: 69
End: 2024-11-20
Payer: COMMERCIAL

## 2024-11-20 NOTE — TELEPHONE ENCOUNTER
Pre assessment completed for upcoming procedure.   (Please see previous telephone encounter notes for complete details)      Procedure details:    Arrival time and facility location reviewed.    Pre op exam needed? No.    Designated  policy reviewed. Instructed to have someone stay 6  hours post procedure.       Medication review:    Oral diabetic medication(s): Metformin (glucophage): HOLD day of procedure.  Injectable diabetic medication(s): Ozempic (Semaglutide). Weekly dosing of medication.  HOLD 7 days before procedure.  Follow up with managing provider.       Prep for procedure:     Procedure prep instructions reviewed.        Any additional information needed:  N/A      Patient  verbalized understanding and had no questions or concerns at this time.      Sarah Vail RN  Endoscopy Procedure Pre Assessment   [phone] option 2

## 2024-11-20 NOTE — TELEPHONE ENCOUNTER
Pre visit planning completed.      Procedure details:    Patient scheduled for Colonoscopy on 12/3/2024.     Arrival time: 0645. Procedure time 0730    Facility location: [facility]; [address] Floor, [place] [address]. Check in location: 2nd Floor at Surgery desk.    Sedation type: Conscious sedation     Pre op exam needed? No.    Indication for procedure: Screening       Chart review:     Electronic implanted devices? No    Recent diagnosis of diverticulitis within the last 6 weeks? No      Medication review:    Diabetic? Yes. Oral diabetic medications: Metformin (glucophage): HOLD day of procedure.  Diabetic injectables: Ozempic (Semaglutide). Weekly dosing of medication.  HOLD 7 days before procedure.  Follow up with managing provider.     Anticoagulants? No    Weight loss medication/injectable? No GLP-1 medication per patient's medication list. Nursing to verify with pre-assessment call.    Other medication HOLDING recommendations:  N/A      Prep for procedure:     Bowel prep recommendation: Extended Golytely. Bowel prep prescription sent to    [id number] - [place], [place] - [id number] [address]  Due to: diabetes.  and GLP-1 agonist medication noted in chart.     Prep instructions sent via [employer] [place] RN  Endoscopy Procedure Pre Assessment   [phone] option 2

## 2024-12-03 ENCOUNTER — HOSPITAL ENCOUNTER (OUTPATIENT)
Facility: AMBULATORY SURGERY CENTER | Age: 69
Discharge: HOME | End: 2024-12-03
Attending: SURGERY | Admitting: SURGERY
Payer: COMMERCIAL

## 2024-12-03 VITALS
TEMPERATURE: 97.2 F | DIASTOLIC BLOOD PRESSURE: 52 MMHG | HEART RATE: 84 BPM | RESPIRATION RATE: 16 BRPM | OXYGEN SATURATION: 97 % | SYSTOLIC BLOOD PRESSURE: 100 MMHG

## 2024-12-03 LAB
COLONOSCOPY: NORMAL
GLUCOSE BLDC GLUCOMTR-MCNC: 108 MG/DL (ref 70–99)

## 2024-12-03 PROCEDURE — G8918 PT W/O PREOP ORDER IV AB PRO: HCPCS

## 2024-12-03 PROCEDURE — G8907 PT DOC NO EVENTS ON DISCHARG: HCPCS

## 2024-12-03 PROCEDURE — 45385 COLONOSCOPY W/LESION REMOVAL: CPT | Mod: PT

## 2024-12-03 RX ORDER — FENTANYL CITRATE 50 UG/ML
INJECTION, SOLUTION INTRAMUSCULAR; INTRAVENOUS PRN
Status: DISCONTINUED | OUTPATIENT
Start: 2024-12-03 | End: 2024-12-03 | Stop reason: HOSPADM

## 2024-12-03 RX ORDER — NALOXONE HYDROCHLORIDE 0.4 MG/ML
0.2 INJECTION, SOLUTION INTRAMUSCULAR; INTRAVENOUS; SUBCUTANEOUS
Status: DISCONTINUED | OUTPATIENT
Start: 2024-12-03 | End: 2024-12-04 | Stop reason: HOSPADM

## 2024-12-03 RX ORDER — FLUMAZENIL 0.1 MG/ML
0.2 INJECTION, SOLUTION INTRAVENOUS
Status: ACTIVE | OUTPATIENT
Start: 2024-12-03 | End: 2024-12-03

## 2024-12-03 RX ORDER — PROCHLORPERAZINE MALEATE 5 MG/1
5 TABLET ORAL EVERY 6 HOURS PRN
Status: DISCONTINUED | OUTPATIENT
Start: 2024-12-03 | End: 2024-12-04 | Stop reason: HOSPADM

## 2024-12-03 RX ORDER — ONDANSETRON 4 MG/1
4 TABLET, ORALLY DISINTEGRATING ORAL EVERY 6 HOURS PRN
Status: DISCONTINUED | OUTPATIENT
Start: 2024-12-03 | End: 2024-12-04 | Stop reason: HOSPADM

## 2024-12-03 RX ORDER — NALOXONE HYDROCHLORIDE 0.4 MG/ML
0.4 INJECTION, SOLUTION INTRAMUSCULAR; INTRAVENOUS; SUBCUTANEOUS
Status: DISCONTINUED | OUTPATIENT
Start: 2024-12-03 | End: 2024-12-04 | Stop reason: HOSPADM

## 2024-12-03 RX ORDER — ONDANSETRON 2 MG/ML
4 INJECTION INTRAMUSCULAR; INTRAVENOUS
Status: COMPLETED | OUTPATIENT
Start: 2024-12-03 | End: 2024-12-03

## 2024-12-03 RX ORDER — ONDANSETRON 2 MG/ML
4 INJECTION INTRAMUSCULAR; INTRAVENOUS EVERY 6 HOURS PRN
Status: DISCONTINUED | OUTPATIENT
Start: 2024-12-03 | End: 2024-12-04 | Stop reason: HOSPADM

## 2024-12-03 RX ORDER — LIDOCAINE 40 MG/G
CREAM TOPICAL
Status: DISCONTINUED | OUTPATIENT
Start: 2024-12-03 | End: 2024-12-04 | Stop reason: HOSPADM

## 2024-12-03 RX ADMIN — ONDANSETRON 4 MG: 2 INJECTION INTRAMUSCULAR; INTRAVENOUS at 07:19

## 2024-12-03 NOTE — H&P
Pre-Endoscopy History and Physical     Kaykay Rockwell MRN# [id number]   YOB: 1955 Age: 69 year old     Date of Procedure: 12/3/2024  Primary care provider: Kehr, Kristen M  Type of Endoscopy: colonoscopy  Reason for Procedure: family history of colon cancer  Type of Anesthesia Anticipated: Moderate Sedation    HPI:    Kaykay is a 69 year old female who will be undergoing the above procedure.    Father with colon cancer in his 60's.  No prior colonoscopy    A history and physical has been performed. The patient's medications and allergies have been reviewed. The risks and benefits of the procedure and the sedation options and risks were discussed with the patient.  All questions were answered and informed consent was obtained.      She denies a personal or family history of anesthesia complications or bleeding disorders.     Allergies   Allergen Reactions    Betamethasone Dipropionate (Augmented) [Betamethasone]     Amoxicillin-Pot Clavulanate Itching and Rash     redness        Cannot display prior to admission medications because the patient has not been admitted in this contact.     Current Outpatient Medications   Medication Sig Dispense Refill    acetaminophen (TYLENOL) 325 MG tablet Take 2 tablets (650 mg) by mouth every 4 hours as needed for mild pain (Patient taking differently: Take 650 mg by mouth every 4 hours as needed for mild pain. PRN) 50 tablet 0    bisacodyl (DULCOLAX) 5 MG EC tablet Two days prior to exam take two (2) tablets at 4pm. One day prior to exam take two (2) tablets at 4pm 4 tablet 0    ibuprofen (ADVIL/MOTRIN) 600 MG tablet Take 600 mg by mouth every 6 hours as needed for moderate pain PRN      polyethylene glycol (GOLYTELY) 236 g suspension Two days before procedure at 5PM fill first container with water. Mix and drink an 8 oz glass every 15 minutes until HALF of the container is gone. Place the remainder in the refrigerator. One day before procedure at 5PM drink second  half of bowel prep. Drink an 8 oz glass every 15 minutes until it is gone. Day of procedure 6 hours before arrival time fill the 2nd container with water. Mix and drink an 8 oz glass every 15 minutes until HALF of the container is gone. Discard the remaining solution. 8000 mL 0    alendronate (FOSAMAX) 70 MG tablet Take 1 tablet (70 mg) by mouth every 7 days 12 tablet 3    atorvastatin (LIPITOR) 20 MG tablet Take 1 tablet (20 mg) by mouth daily 90 tablet 3    blood glucose (ACCU-CHEK GUIDE) test strip Use to test blood sugar 2 times daily or as directed. 200 strip 11    blood glucose monitoring (SOFTCLIX) lancets 1 each 2 times daily Use to test blood sugar 2 times daily or as directed. 100 each 11    Calcium Carb-Cholecalciferol 600-12.5 MG-MCG CAPS       clobetasol (TEMOVATE) 0.05 % external ointment Apply topically BID to the AA on the legs, do not use over open skin. Repeat daily for 2 weeks. Then use intermittently thereafter. Avoid use on the face, neck, groin, and underarms. 60 g 1    Continuous Glucose Sensor (FREESTYLE LANDON 3 SENSOR) [facility] 1 Box. continuously. Change out every 14 days 6 each 11    gentamicin (GARAMYCIN) 0.1 % external ointment Apply topically 3 times daily 30 g 1    lisinopril (ZESTRIL) 2.5 MG tablet Take 1 tablet (2.5 mg) by mouth daily 90 tablet 3    metFORMIN (GLUCOPHAGE XR) 500 MG 24 hr tablet Take 2 tablets (1,000 mg) by mouth 2 times daily (with meals) 360 tablet 3    OZEMPIC, 0.25 OR 0.5 MG/DOSE, 2 MG/3ML pen Inject 0.5 mg under the skin every 7 days. 9 mL 0    STATIN NOT PRESCRIBED (INTENTIONAL) Please choose reason not prescribed from choices below.       Current Facility-Administered Medications   Medication Dose Route Frequency Provider Last Rate Last Admin    lidocaine (LMX4) kit   Topical Q1H PRN Matias Easton MD        lidocaine 1 % 0.1-1 mL  0.1-1 mL Other Q1H PRN Matias Easton MD        ondansetron (ZOFRAN) injection 4 mg  4 mg Intravenous Once PRN  Matias Easton MD        sodium chloride (PF) 0.9% PF flush 3 mL  3 mL Intracatheter Q8H Matias Easton MD        sodium chloride (PF) 0.9% PF flush 3 mL  3 mL Intracatheter q1 min prn Matias Easton MD             Patient Active Problem List   Diagnosis    CARDIOVASCULAR SCREENING; LDL GOAL LESS THAN 130    Other closed fracture of patella with routine healing, unspecified laterality, subsequent encounter    Essential hypertension    Closed displaced intertrochanteric fracture of left femur (H)    Type 2 diabetes mellitus without complication, without long-term current use of insulin (H)    Osteoporosis with current pathological fracture with routine healing, unspecified osteoporosis type, subsequent encounter        Past Medical History:   Diagnosis Date    Diabetes mellitus, type 2 (H) 04/06/2023    Hypertension     NO ACTIVE PROBLEMS     Patella fracture     DOI 3/31/23 B non displaced patellar fracture.        Past Surgical History:   Procedure Laterality Date    BIOPSY  November, 2022    OPEN REDUCTION INTERNAL FIXATION PATELLA Bilateral 4/14/2023    Procedure: OPEN REDUCTION INTERNAL FIXATION,BILATERAL PATELLA;  Surgeon: Noman Hernández MD;  Location:  OR    ORTHOPEDIC SURGERY  2010    SURGICAL HISTORY OF -       left shoulder torn rotator    TONSILLECTOMY         Social History     Tobacco Use    Smoking status: Former     Current packs/day: 0.50     Average packs/day: 0.5 packs/day for 39.0 years (19.5 ttl pk-yrs)     Types: Cigarettes    Smokeless tobacco: Never   Substance Use Topics    Alcohol use: Yes     Comment: occ.       Family History   Problem Relation Age of Onset    Diabetes Mother     Heart Disease Mother     Hypertension Mother     Cancer Mother     Glaucoma Mother 80        drops    Macular Degeneration Mother         loss of vison    Depression Mother     Diabetes Father     Hypertension Father     Cancer Father     Diabetes Sister     Cerebrovascular Disease  "No family hx of     Thyroid Disease No family hx of        REVIEW OF SYSTEMS:     5 point ROS negative except as noted above in HPI, including Gen., Resp., CV, GI &  system review.      PHYSICAL EXAM:   BP (!) 146/78   Pulse 75   Temp 97.2  F (36.2  C) (Temporal)   Resp 16   LMP  (LMP Unknown)   SpO2 98%  Estimated body mass index is 23.45 kg/m  as calculated from the following:    Height as of 10/23/24: 1.657 m (5' 5.25\").    Weight as of 10/23/24: 64.4 kg (142 lb).   GENERAL APPEARANCE: healthy, alert, and no distress  MENTAL STATUS: alert  AIRWAY EXAM: Mallampatti Class II (visualization of the soft palate, fauces, and uvula)  RESP: lungs clear to auscultation - no rales, rhonchi or wheezes  CV: regular rates and rhythm      DIAGNOSTICS:    Not indicated      IMPRESSION   ASA Class 2 - Mild systemic disease        PLAN:       Plan for colonoscopy. We discussed the risks, benefits and alternatives and the patient wished to proceed.    The above has been forwarded to the consulting provider.      Signed Electronically by: Matias Easton MD  December 3, 2024    "

## 2024-12-21 ENCOUNTER — HEALTH MAINTENANCE LETTER (OUTPATIENT)
Age: 69
End: 2024-12-21

## 2024-12-24 PROBLEM — D12.6 ADENOMATOUS POLYP OF COLON: Status: ACTIVE | Noted: 2024-12-24

## 2024-12-26 DIAGNOSIS — E11.9 TYPE 2 DIABETES MELLITUS WITHOUT COMPLICATION, WITHOUT LONG-TERM CURRENT USE OF INSULIN (H): ICD-10-CM

## 2024-12-26 RX ORDER — ATORVASTATIN CALCIUM 20 MG/1
20 TABLET, FILM COATED ORAL DAILY
Qty: 90 TABLET | Refills: 0 | Status: SHIPPED | OUTPATIENT
Start: 2024-12-26

## 2024-12-31 ENCOUNTER — PATIENT OUTREACH (OUTPATIENT)
Dept: GASTROENTEROLOGY | Facility: CLINIC | Age: 69
End: 2024-12-31
Payer: COMMERCIAL

## 2025-01-17 DIAGNOSIS — E11.9 TYPE 2 DIABETES MELLITUS WITHOUT COMPLICATION, WITHOUT LONG-TERM CURRENT USE OF INSULIN (H): ICD-10-CM

## 2025-01-18 SDOH — HEALTH STABILITY: PHYSICAL HEALTH: ON AVERAGE, HOW MANY MINUTES DO YOU ENGAGE IN EXERCISE AT THIS LEVEL?: 10 MIN

## 2025-01-18 SDOH — HEALTH STABILITY: PHYSICAL HEALTH: ON AVERAGE, HOW MANY DAYS PER WEEK DO YOU ENGAGE IN MODERATE TO STRENUOUS EXERCISE (LIKE A BRISK WALK)?: 2 DAYS

## 2025-01-18 ASSESSMENT — SOCIAL DETERMINANTS OF HEALTH (SDOH): HOW OFTEN DO YOU GET TOGETHER WITH FRIENDS OR RELATIVES?: THREE TIMES A WEEK

## 2025-01-20 RX ORDER — METFORMIN HYDROCHLORIDE 500 MG/1
1000 TABLET, EXTENDED RELEASE ORAL 2 TIMES DAILY WITH MEALS
Qty: 360 TABLET | Refills: 0 | Status: SHIPPED | OUTPATIENT
Start: 2025-01-20 | End: 2025-01-21 | Stop reason: DRUGHIGH

## 2025-01-21 ENCOUNTER — OFFICE VISIT (OUTPATIENT)
Dept: FAMILY MEDICINE | Facility: CLINIC | Age: 70
End: 2025-01-21
Payer: COMMERCIAL

## 2025-01-21 VITALS
BODY MASS INDEX: 23.82 KG/M2 | WEIGHT: 143 LBS | HEIGHT: 65 IN | OXYGEN SATURATION: 99 % | HEART RATE: 89 BPM | DIASTOLIC BLOOD PRESSURE: 69 MMHG | TEMPERATURE: 96 F | RESPIRATION RATE: 16 BRPM | SYSTOLIC BLOOD PRESSURE: 123 MMHG

## 2025-01-21 DIAGNOSIS — I10 ESSENTIAL HYPERTENSION: ICD-10-CM

## 2025-01-21 DIAGNOSIS — M80.00XD OSTEOPOROSIS WITH CURRENT PATHOLOGICAL FRACTURE WITH ROUTINE HEALING, UNSPECIFIED OSTEOPOROSIS TYPE, SUBSEQUENT ENCOUNTER: ICD-10-CM

## 2025-01-21 DIAGNOSIS — E11.9 TYPE 2 DIABETES MELLITUS WITHOUT COMPLICATION, WITHOUT LONG-TERM CURRENT USE OF INSULIN (H): ICD-10-CM

## 2025-01-21 DIAGNOSIS — Z00.00 ENCOUNTER FOR MEDICARE ANNUAL WELLNESS EXAM: Primary | ICD-10-CM

## 2025-01-21 PROCEDURE — 99214 OFFICE O/P EST MOD 30 MIN: CPT | Mod: 25 | Performed by: PHYSICIAN ASSISTANT

## 2025-01-21 PROCEDURE — G2211 COMPLEX E/M VISIT ADD ON: HCPCS | Performed by: PHYSICIAN ASSISTANT

## 2025-01-21 PROCEDURE — G0439 PPPS, SUBSEQ VISIT: HCPCS | Performed by: PHYSICIAN ASSISTANT

## 2025-01-21 RX ORDER — LISINOPRIL 2.5 MG/1
2.5 TABLET ORAL DAILY
Qty: 90 TABLET | Refills: 3 | Status: SHIPPED | OUTPATIENT
Start: 2025-01-21

## 2025-01-21 RX ORDER — ATORVASTATIN CALCIUM 20 MG/1
20 TABLET, FILM COATED ORAL DAILY
Qty: 90 TABLET | Refills: 0 | Status: SHIPPED | OUTPATIENT
Start: 2025-01-21

## 2025-01-21 RX ORDER — ALENDRONATE SODIUM 70 MG/1
70 TABLET ORAL
Qty: 12 TABLET | Refills: 3 | Status: SHIPPED | OUTPATIENT
Start: 2025-01-21

## 2025-01-21 ASSESSMENT — PAIN SCALES - GENERAL: PAINLEVEL_OUTOF10: NO PAIN (0)

## 2025-01-21 NOTE — PROGRESS NOTES
Preventive Care Visit  [facility] [place] Kristen M. Kehr, PA-C, Family Medicine  Jan 21, 2025      Assessment & Plan     Encounter for Medicare annual wellness exam  Health maintenance reviewed and updated.  Immunizations to be given at the pharmacy due to coverage.     Type 2 diabetes mellitus without complication, without long-term current use of insulin (H)  Make adjustments to see  if she has improvement with loose stool. She will discontinue with metformin since this caused some GI issues previously.   Increase the ozempic to 1 mg   She has a CGM and monitors closely.   She can use imodium as needed for the loose stool also.   She will be due again for lab tests and appointment in 6 months.   Notify if concerns or questions in the meantime.   - OPTOMETRY REFERRAL; Future  - HEMOGLOBIN A1C; Future  - Lipid panel reflex to direct LDL Fasting; Future  - Semaglutide, 1 MG/DOSE, (OZEMPIC) 4 MG/3ML pen; Inject 1 mg subcutaneously every 7 days.  - atorvastatin (LIPITOR) 20 MG tablet; Take 1 tablet (20 mg) by mouth daily.    Osteoporosis with current pathological fracture with routine healing, unspecified osteoporosis type, subsequent encounter  Continue with the fosamax she has been taking since 2022.   - alendronate (FOSAMAX) 70 MG tablet; Take 1 tablet (70 mg) by mouth every 7 days.    Essential hypertension  Stable, refills given.   Blood pressure is at goal.   - lisinopril (ZESTRIL) 2.5 MG tablet; Take 1 tablet (2.5 mg) by mouth daily.      The longitudinal plan of care for the diagnosis(es)/condition(s) as documented were addressed during this visit. Due to the added complexity in care, I will continue to support Janee in the subsequent management and with ongoing continuity of care.      Counseling  Appropriate preventive services were addressed with this patient via screening, questionnaire, or discussion as appropriate for fall prevention, nutrition, physical activity, Tobacco-use cessation,  social engagement, weight loss and cognition.  Checklist reviewing preventive services available has been given to the patient.  Reviewed patient's diet, addressing concerns and/or questions.   She is at risk for lack of exercise and has been provided with information to increase physical activity for the benefit of her well-being.   She is at risk for psychosocial distress and has been provided with information to reduce risk.           Sivakumar Weller is a 69 year old, presenting for the following:  Wellness Visit        1/21/2025     9:07 AM   Additional Questions   Roomed by Isidro GUARDADO  Janee is here today for preventative care and to address the following conditions:    Type 2 diabetes: she is on the ozempic 0.5 mg and the metformin. She struggles with loose stool every few weeks, questions if this is related to her medication. She had a colonoscopy last month. She had polyps removed, but no inflammatory changes seen. She initially had GI upset with the metformin. She has been taking 1000 mg twice daily for years now. Her A1C is well controlled.   She is scheduled to have her DEXA scan later this year. She has been on Fosamax now since 2022.   Hypertension: well controlled with the lisinopril.           Health Care Directive  Patient has a Health Care Directive on file  Advance care planning document is on file and is current.      1/18/2025   General Health   How would you rate your overall physical health? (!) FAIR   Feel stress (tense, anxious, or unable to sleep) To some extent   (!) STRESS CONCERN      1/18/2025   Nutrition   Diet: Diabetic         1/18/2025   Exercise   Days per week of moderate/strenous exercise 2 days   Average minutes spent exercising at this level 10 min   (!) EXERCISE CONCERN      1/18/2025   Social Factors   Frequency of gathering with friends or relatives Three times a week   Worry food won't last until get money to buy more No   Food not last or not have enough  money for food? No   Do you have housing? (Housing is defined as stable permanent housing and does not include staying ouside in a car, in a tent, in an abandoned building, in an overnight shelter, or couch-surfing.) Yes   Are you worried about losing your housing? No   Lack of transportation? No   Unable to get utilities (heat,electricity)? No         1/21/2025   Fall Risk   Gait Speed Test (Document in seconds) 5.03   Gait Speed Test Interpretation Greater than 5.01 seconds - ABNORMAL          1/18/2025   Activities of Daily Living- Home Safety   Needs help with the following daily activites None of the above   Safety concerns in the home None of the above         1/18/2025   Dental   Dentist two times every year? Yes         1/18/2025   Hearing Screening   Hearing concerns? None of the above         1/18/2025   Driving Risk Screening   Patient/family members have concerns about driving No         1/18/2025   General Alertness/Fatigue Screening   Have you been more tired than usual lately? No         1/18/2025   Urinary Incontinence Screening   Bothered by leaking urine in past 6 months No         1/18/2025   TB Screening   Were you born outside of the [place]? No         Today's PHQ-2 Score:       1/20/2025     1:51 PM   PHQ-2 ( 1999 Pf[employer])   Q1: Little interest or pleasure in doing things 1   Q2: Feeling down, depressed or hopeless 1   PHQ-2 Score 2    Q1: Little interest or pleasure in doing things Several days   Q2: Feeling down, depressed or hopeless Several days   PHQ-2 Score 2       Patient-reported           1/18/2025   Substance Use   Alcohol more than 3/day or more than 7/wk No   Do you have a current opioid prescription? No   How severe/bad is pain from 1 to 10? 2/10   Do you use any other substances recreationally? No     Social History     Tobacco Use    Smoking status: Former     Current packs/day: 0.50     Average packs/day: 0.5 packs/day for 39.0 years (19.5 ttl pk-yrs)     Types: Cigarettes     Smokeless tobacco: Never   Vaping Use    Vaping status: Never Used   Substance Use Topics    Alcohol use: Yes     Comment: occ.    Drug use: No           12/29/2022   LAST FHS-7 RESULTS   1st degree relative breast or ovarian cancer No   Any relative bilateral breast cancer No   Any male have breast cancer No   Any ONE woman have BOTH breast AND ovarian cancer No   Any woman with breast cancer before 50yrs No   2 or more relatives with breast AND/OR ovarian cancer No   2 or more relatives with breast AND/OR bowel cancer No        Mammogram Screening - Mammogram every 1-2 years updated in Health Maintenance based on mutual decision making      History of abnormal Pap smear: No - age 65 or older with adequate negative prior screening test results (3 consecutive negative cytology results, 2 consecutive negative cotesting results, or 2 consecutive negative HrHPV test results within 10 years, with the most recent test occurring within the recommended screening interval for the test used)       ASCVD Risk   The 10-year ASCVD risk score (Urszula ESCOBEDO, et al., 2019) is: 17.4%    Values used to calculate the score:      Age: 69 years      Sex: Female      Is Non- : No      Diabetic: Yes      Tobacco smoker: No      Systolic Blood Pressure: 123 mmHg      Is BP treated: Yes      HDL Cholesterol: 61 mg/dL      Total Cholesterol: 154 mg/dL            Reviewed and updated as needed this visit by Provider                    Past Medical History:   Diagnosis Date    Diabetes mellitus, type 2 (H) 04/06/2023    Hypertension     NO ACTIVE PROBLEMS     Patella fracture     DOI 3/31/23 B non displaced patellar fracture.     Past Surgical History:   Procedure Laterality Date    BIOPSY  November, 2022    COLONOSCOPY N/A 12/3/2024    Procedure: COLONOSCOPY, FLEXIBLE, WITH LESION REMOVAL USING SNARE;  Surgeon: Matias Easton MD;  Location: [facility] OR    COLONOSCOPY WITH CO2 INSUFFLATION N/A 12/3/2024     Procedure: Colonoscopy with CO2 insufflation;  Surgeon: Matias Easton MD;  Location:  OR    OPEN REDUCTION INTERNAL FIXATION PATELLA Bilateral 4/14/2023    Procedure: OPEN REDUCTION INTERNAL FIXATION,BILATERAL PATELLA;  Surgeon: Noman Hernández MD;  Location:  OR    ORTHOPEDIC SURGERY  2010    SURGICAL HISTORY OF -       left shoulder torn rotator    TONSILLECTOMY       BP Readings from Last 3 Encounters:   01/21/25 123/69   12/03/24 100/52   10/23/24 125/80    Wt Readings from Last 3 Encounters:   01/21/25 64.9 kg (143 lb)   10/23/24 64.4 kg (142 lb)   01/17/24 61.2 kg (135 lb)                  Patient Active Problem List   Diagnosis    CARDIOVASCULAR SCREENING; LDL GOAL LESS THAN 130    Other closed fracture of patella with routine healing, unspecified laterality, subsequent encounter    Essential hypertension    Closed displaced intertrochanteric fracture of left femur (H)    Type 2 diabetes mellitus without complication, without long-term current use of insulin (H)    Osteoporosis with current pathological fracture with routine healing, unspecified osteoporosis type, subsequent encounter    Adenomatous polyp of colon     Past Surgical History:   Procedure Laterality Date    BIOPSY  November, 2022    COLONOSCOPY N/A 12/3/2024    Procedure: COLONOSCOPY, FLEXIBLE, WITH LESION REMOVAL USING SNARE;  Surgeon: Matias Easton MD;  Location:  OR    COLONOSCOPY WITH CO2 INSUFFLATION N/A 12/3/2024    Procedure: Colonoscopy with CO2 insufflation;  Surgeon: Matias Easton MD;  Location:  OR    OPEN REDUCTION INTERNAL FIXATION PATELLA Bilateral 4/14/2023    Procedure: OPEN REDUCTION INTERNAL FIXATION,BILATERAL PATELLA;  Surgeon: Noman Hernández MD;  Location:  OR    ORTHOPEDIC SURGERY  2010    SURGICAL HISTORY OF -       left shoulder torn rotator    TONSILLECTOMY         Social History     Tobacco Use    Smoking status: Former     Current packs/day: 0.50     Average packs/day:  0.5 packs/day for 39.0 years (19.5 ttl pk-yrs)     Types: Cigarettes    Smokeless tobacco: Never   Substance Use Topics    Alcohol use: Yes     Comment: occ.     Family History   Problem Relation Age of Onset    Diabetes Mother     Heart Disease Mother     Hypertension Mother     Cancer Mother     Glaucoma Mother 80        drops    Macular Degeneration Mother         loss of vison    Depression Mother     Diabetes Father     Hypertension Father     Cancer Father     Diabetes Sister     Cerebrovascular Disease No family hx of     Thyroid Disease No family hx of          Current Outpatient Medications   Medication Sig Dispense Refill    acetaminophen (TYLENOL) 325 MG tablet Take 2 tablets (650 mg) by mouth every 4 hours as needed for mild pain (Patient taking differently: Take 650 mg by mouth every 4 hours as needed for mild pain. PRN) 50 tablet 0    alendronate (FOSAMAX) 70 MG tablet Take 1 tablet (70 mg) by mouth every 7 days. 12 tablet 3    atorvastatin (LIPITOR) 20 MG tablet Take 1 tablet (20 mg) by mouth daily. 90 tablet 0    Calcium Carb-Cholecalciferol 600-12.5 MG-MCG CAPS       Continuous Glucose Sensor (FREESTYLE LANDON 3 SENSOR) MISC 1 Box. continuously. Change out every 14 days 6 each 11    ibuprofen (ADVIL/MOTRIN) 600 MG tablet Take 600 mg by mouth every 6 hours as needed for moderate pain PRN      lisinopril (ZESTRIL) 2.5 MG tablet Take 1 tablet (2.5 mg) by mouth daily. 90 tablet 3    Semaglutide, 1 MG/DOSE, (OZEMPIC) 4 MG/3ML pen Inject 1 mg subcutaneously every 7 days. 9 mL 3    STATIN NOT PRESCRIBED (INTENTIONAL) Please choose reason not prescribed from choices below.       Allergies   Allergen Reactions    Betamethasone Dipropionate (Augmented) [Betamethasone]     Amoxicillin-Pot Clavulanate Itching and Rash     redness     Recent Labs   Lab Test 07/17/24  0924 01/09/24  0741 08/08/23  0931 01/30/23  0940 01/11/23  0656   A1C 7.0* 6.6* 6.7*   < >  --    LDL  --  74  --   --  89   HDL  --  61  --   --   "58   TRIG  --  97  --   --  106   ALT 17  --   --   --   --    CR 0.67 0.63  --    < > 0.64   GFRESTIMATED >90 >90  --    < > >90   POTASSIUM 4.2 4.2  --    < > 4.1   TSH  --   --   --   --  2.39    < > = values in this interval not displayed.      Current providers sharing in care for this patient include:  Patient Care Team:  Kehr, Kristen M, PA-C as PCP - General (Family Medicine)  Kehr, Kristen M, PA-C as Referring Physician (Family Medicine)  Kurtis Jarrell MD as MD (Dermatology)  Kehr, Kristen M, PA-C as Assigned PCP  Ellen Min RN as Diabetes Educator (Diabetes Education)  Noman Hernández MD as Assigned Musculoskeletal Provider    The following health maintenance items are reviewed in Epic and correct as of today:  Health Maintenance   Topic Date Due    DIABETIC FOOT EXAM  Never done    ANNUAL REVIEW OF HM ORDERS  Never done    ZOSTER IMMUNIZATION (1 of 2) Never done    LUNG CANCER SCREENING  Never done    EYE EXAM  09/04/2019    A1C  10/17/2024    LIPID  01/09/2025    MAMMO SCREENING  01/12/2025    BMP  07/17/2025    MICROALBUMIN  07/17/2025    DEXA  12/14/2025    MEDICARE ANNUAL WELLNESS VISIT  01/21/2026    FALL RISK ASSESSMENT  01/21/2026    DTAP/TDAP/TD IMMUNIZATION (2 - Td or Tdap) 10/26/2027    COLORECTAL CANCER SCREENING  12/03/2027    ADVANCE CARE PLANNING  01/17/2029    HEPATITIS C SCREENING  Completed    PHQ-2 (once per calendar year)  Completed    INFLUENZA VACCINE  Completed    Pneumococcal Vaccine: 50+ Years  Completed    RSV VACCINE  Completed    COVID-19 Vaccine  Completed    HPV IMMUNIZATION  Aged Out    MENINGITIS IMMUNIZATION  Aged Out    RSV MONOCLONAL ANTIBODY  Aged Out         Review of Systems  Constitutional, HEENT, cardiovascular, pulmonary, GI, , musculoskeletal, neuro, skin, endocrine and psych systems are negative, except as otherwise noted.     Objective    Exam  /69   Pulse 89   Temp (!) 96  F (35.6  C) (Tympanic)   Resp 16   Ht 1.657 m (5' 5.25\")   " "Wt 64.9 kg (143 lb)   LMP  (LMP Unknown)   SpO2 99%   Breastfeeding No   BMI 23.61 kg/m     Estimated body mass index is 23.61 kg/m  as calculated from the following:    Height as of this encounter: 1.657 m (5' 5.25\").    Weight as of this encounter: 64.9 kg (143 lb).    Physical Exam  GENERAL: alert and no distress  EYES: Eyes grossly normal to inspection, PERRL and conjunctivae and sclerae normal  HENT: ear canals and TM's normal, nose and mouth without ulcers or lesions  NECK: no adenopathy, no asymmetry, masses, or scars  RESP: lungs clear to auscultation - no rales, rhonchi or wheezes  BREAST: normal without masses, tenderness or nipple discharge and no palpable axillary masses or adenopathy  CV: regular rate and rhythm, normal S1 S2, no S3 or S4, no murmur, click or rub, no peripheral edema  ABDOMEN: soft, nontender, no hepatosplenomegaly, no masses and bowel sounds normal  MS: no gross musculoskeletal defects noted, no edema  SKIN: no suspicious lesions or rashes  NEURO: Normal strength and tone, mentation intact and speech normal  PSYCH: mentation appears normal, affect normal/bright  Foot Exam: normal DP and PT pulses, no trophic changes or ulcerative lesions, and normal sensory exam          1/21/2025   Mini Cog   Mini-Cog Not Completed (choose reason) Patient declines       Cognitive Screening Cognition normal during direct observation during interview and exam             Signed Electronically by: Kristen M. Kehr, PA-C    "

## 2025-01-21 NOTE — PATIENT INSTRUCTIONS
Increase the ozempic to 1 mg weekly   Wean off of the metformin     Monitor the changes with your bowels over the next 3-6 months after making the changes. Notify if there is concern with your blood sugar numbers also.     Continue with all other medications.     Schedule fasting lab appointment          Patient Education   Preventive Care Advice   This is general advice given by our system to help you stay healthy. However, your care team may have specific advice just for you. Please talk to your care team about your preventive care needs.  Nutrition  Eat 5 or more servings of fruits and vegetables each day.  Try wheat bread, brown rice and whole grain pasta (instead of white bread, rice, and pasta).  Get enough calcium and vitamin D. Check the label on foods and aim for 100% of the RDA (recommended daily allowance).  Lifestyle  Exercise at least 150 minutes each week  (30 minutes a day, 5 days a week).  Do muscle strengthening activities 2 days a week. These help control your weight and prevent disease.  No smoking.  Wear sunscreen to prevent skin cancer.  Have a dental exam and cleaning every 6 months.  Yearly exams  See your health care team every year to talk about:  Any changes in your health.  Any medicines your care team has prescribed.  Preventive care, family planning, and ways to prevent chronic diseases.  Shots (vaccines)   HPV shots (up to age 26), if you've never had them before.  Hepatitis B shots (up to age 59), if you've never had them before.  COVID-19 shot: Get this shot when it's due.  Flu shot: Get a flu shot every year.  Tetanus shot: Get a tetanus shot every 10 years.  Pneumococcal, hepatitis A, and RSV shots: Ask your care team if you need these based on your risk.  Shingles shot (for age 50 and up)  General health tests  Diabetes screening:  Starting at age 35, Get screened for diabetes at least every 3 years.  If you are younger than age 35, ask your care team if you should be screened for  diabetes.  Cholesterol test: At age 39, start having a cholesterol test every 5 years, or more often if advised.  Bone density scan (DEXA): At age 50, ask your care team if you should have this scan for osteoporosis (brittle bones).  Hepatitis C: Get tested at least once in your life.  STIs (sexually transmitted infections)  Before age 24: Ask your care team if you should be screened for STIs.  After age 24: Get screened for STIs if you're at risk. You are at risk for STIs (including HIV) if:  You are sexually active with more than one person.  You don't use condoms every time.  You or a partner was diagnosed with a sexually transmitted infection.  If you are at risk for HIV, ask about PrEP medicine to prevent HIV.  Get tested for HIV at least once in your life, whether you are at risk for HIV or not.  Cancer screening tests  Cervical cancer screening: If you have a cervix, begin getting regular cervical cancer screening tests starting at age 21.  Breast cancer scan (mammogram): If you've ever had breasts, begin having regular mammograms starting at age 40. This is a scan to check for breast cancer.  Colon cancer screening: It is important to start screening for colon cancer at age 45.  Have a colonoscopy test every 10 years (or more often if you're at risk) Or, ask your provider about stool tests like a FIT test every year or Cologuard test every 3 years.  To learn more about your testing options, visit:   .  For help making a decision, visit:   https://bit.ly/qy46325.  Prostate cancer screening test: If you have a prostate, ask your care team if a prostate cancer screening test (PSA) at age 55 is right for you.  Lung cancer screening: If you are a current or former smoker ages 50 to 80, ask your care team if ongoing lung cancer screenings are right for you.  For informational purposes only. Not to replace the advice of your health care provider. Copyright   2023 [employer]. All rights reserved.  Clinically reviewed by the [facility]itions [facility]. [employer] 933486 - REV 01/24.  Preventing Falls: Care Instructions  Injuries and health problems such as trouble walking or poor eyesight can increase your risk of falling. So can some medicines. But there are things you can do to help prevent falls. You can exercise to get stronger. You can also arrange your home to make it safer.    Talk to your doctor about the medicines you take. Ask if any of them increase the risk of falls and whether they can be changed or stopped.   Try to exercise regularly. It can help improve your strength and balance. This can help lower your risk of falling.         Practice fall safety and prevention.   Wear low-heeled shoes that fit well and give your feet good support. Talk to your doctor if you have foot problems that make this hard.  Carry a cellphone or wear a medical alert device that you can use to call for help.  Use stepladders instead of chairs to reach high objects. Don't climb if you're at risk for falls. Ask for help, if needed.  Wear the correct eyeglasses, if you need them.        Make your home safer.   Remove rugs, cords, clutter, and furniture from walkways.  Keep your house well lit. Use night-lights in hallways and bathrooms.  Install and use sturdy handrails on stairways.  Wear nonskid footwear, even inside. Don't walk barefoot or in socks without shoes.        Be safe outside.   Use handrails, curb cuts, and ramps whenever possible.  Keep your hands free by using a shoulder bag or backpack.  Try to walk in well-lit areas. Watch out for uneven ground, changes in pavement, and debris.  Be careful in the winter. Walk on the grass or gravel when sidewalks are slippery. Use de-icer on steps and walkways. Add non-slip devices to shoes.    Put grab bars and nonskid mats in your shower or tub and near the toilet. Try to use a shower chair or bath bench when bathing.   Get into a tub or shower by putting in  "your weaker leg first. Get out with your strong side first. Have a phone or medical alert device in the bathroom with you.   Where can you learn more?  Go to https://www.[employer].net/patiented  Enter G117 in the search box to learn more about \"Preventing Falls: Care Instructions.\"  Current as of: July 31, 2024  Content Version: 14.3    2024 [employer].   Care instructions adapted under license by your healthcare professional. If you have questions about a medical condition or this instruction, always ask your healthcare professional. [employer] disclaims any warranty or liability for your use of this information.    Learning About Stress  What is stress?     Stress is your body's response to a hard situation. Your body can have a physical, emotional, or mental response. Stress is a fact of life for most people, and it affects everyone differently. What causes stress for you may not be stressful for someone else.  A lot of things can cause stress. You may feel stress when you go on a job interview, take a test, or run a race. This kind of short-term stress is normal and even useful. It can help you if you need to work hard or react quickly. For example, stress can help you finish an important job on time.  Long-term stress is caused by ongoing stressful situations or events. Examples of long-term stress include long-term health problems, ongoing problems at work, or conflicts in your family. Long-term stress can harm your health.  How does stress affect your health?  When you are stressed, your body responds as though you are in danger. It makes hormones that speed up your heart, make you breathe faster, and give you a burst of energy. This is called the fight-or-flight stress response. If the stress is over quickly, your body goes back to normal and no harm is done.  But if stress happens too often or lasts too long, it can have bad effects. Long-term stress can make you more likely to get " sick, and it can make symptoms of some diseases worse. If you tense up when you are stressed, you may develop neck, shoulder, or low back pain. Stress is linked to high blood pressure and heart disease.  Stress also harms your emotional health. It can make you charles, tense, or depressed. Your relationships may suffer, and you may not do well at work or school.  What can you do to manage stress?  You can try these things to help manage stress:   Do something active. Exercise or activity can help reduce stress. Walking is a great way to get started. Even everyday activities such as housecleaning or yard work can help.  Try yoga or sonny chi. These techniques combine exercise and meditation. You may need some training at first to learn them.  Do something you enjoy. For example, listen to music or go to a movie. Practice your hobby or do volunteer work.  Meditate. This can help you relax, because you are not worrying about what happened before or what may happen in the future.  Do guided imagery. Imagine yourself in any setting that helps you feel calm. You can use online videos, books, or a teacher to guide you.  Do breathing exercises. For example:  From a standing position, bend forward from the waist with your knees slightly bent. Let your arms dangle close to the floor.  Breathe in slowly and deeply as you return to a standing position. Roll up slowly and lift your head last.  Hold your breath for just a few seconds in the standing position.  Breathe out slowly and bend forward from the waist.  Let your feelings out. Talk, laugh, cry, and express anger when you need to. Talking with supportive friends or family, a counselor, or a joy leader about your feelings is a healthy way to relieve stress. Avoid discussing your feelings with people who make you feel worse.  Write. It may help to write about things that are bothering you. This helps you find out how much stress you feel and what is causing it. When you know  "this, you can find better ways to cope.  What can you do to prevent stress?  You might try some of these things to help prevent stress:  Manage your time. This helps you find time to do the things you want and need to do.  Get enough sleep. Your body recovers from the stresses of the day while you are sleeping.  Get support. Your family, friends, and community can make a difference in how you experience stress.  Limit your news feed. Avoid or limit time on social media or news that may make you feel stressed.  Do something active. Exercise or activity can help reduce stress. Walking is a great way to get started.  Where can you learn more?  Go to https://www.[employer].net/patiented  Enter N032 in the search box to learn more about \"Learning About Stress.\"  Current as of: October 24, 2023  Content Version: 14.3    2024 [employer].   Care instructions adapted under license by your healthcare professional. If you have questions about a medical condition or this instruction, always ask your healthcare professional. [employer] disclaims any warranty or liability for your use of this information.       "

## 2025-01-30 ENCOUNTER — LAB (OUTPATIENT)
Dept: LAB | Facility: CLINIC | Age: 70
End: 2025-01-30
Payer: COMMERCIAL

## 2025-01-30 DIAGNOSIS — E11.9 TYPE 2 DIABETES MELLITUS WITHOUT COMPLICATION, WITHOUT LONG-TERM CURRENT USE OF INSULIN (H): ICD-10-CM

## 2025-01-30 LAB
CHOLEST SERPL-MCNC: 125 MG/DL
EST. AVERAGE GLUCOSE BLD GHB EST-MCNC: 154 MG/DL
FASTING STATUS PATIENT QL REPORTED: YES
HBA1C MFR BLD: 7 % (ref 0–5.6)
HDLC SERPL-MCNC: 58 MG/DL
LDLC SERPL CALC-MCNC: 44 MG/DL
NONHDLC SERPL-MCNC: 67 MG/DL
TRIGL SERPL-MCNC: 114 MG/DL

## 2025-03-24 DIAGNOSIS — E11.9 TYPE 2 DIABETES MELLITUS WITHOUT COMPLICATION, WITHOUT LONG-TERM CURRENT USE OF INSULIN (H): ICD-10-CM

## 2025-03-24 RX ORDER — ATORVASTATIN CALCIUM 20 MG/1
20 TABLET, FILM COATED ORAL DAILY
Qty: 90 TABLET | Refills: 2 | Status: SHIPPED | OUTPATIENT
Start: 2025-03-24

## 2025-04-01 ENCOUNTER — MYC MEDICAL ADVICE (OUTPATIENT)
Dept: FAMILY MEDICINE | Facility: CLINIC | Age: 70
End: 2025-04-01
Payer: COMMERCIAL

## 2025-04-01 DIAGNOSIS — E11.9 TYPE 2 DIABETES MELLITUS WITHOUT COMPLICATION, WITHOUT LONG-TERM CURRENT USE OF INSULIN (H): Primary | ICD-10-CM

## 2025-04-01 RX ORDER — HYDROCHLOROTHIAZIDE 12.5 MG/1
CAPSULE ORAL
Qty: 6 EACH | Refills: 2 | Status: SHIPPED | OUTPATIENT
Start: 2025-04-01

## 2025-04-02 ENCOUNTER — MYC MEDICAL ADVICE (OUTPATIENT)
Dept: FAMILY MEDICINE | Facility: CLINIC | Age: 70
End: 2025-04-02
Payer: COMMERCIAL

## 2025-04-02 ENCOUNTER — TELEPHONE (OUTPATIENT)
Dept: FAMILY MEDICINE | Facility: CLINIC | Age: 70
End: 2025-04-02
Payer: COMMERCIAL

## 2025-04-02 NOTE — TELEPHONE ENCOUNTER
Retail Pharmacy Prior Authorization Team   Phone: [phone]    PRIOR AUTHORIZATION DENIED    Medication: FREESTYLE LANDON 3 PLUS SENSOR MISC  Insurance Company: [email or web address] ([facility]) - Phone [phone] Fax [phone]  Denial Date: 4/2/2025  Denial Reason(s): MUST BE TREATED WITH INSULIN OR HAVE HISTORY OF PROBLEMATIC HYPOGLYCEMIA      Appeal Information: IF THE PROVIDER WOULD LIKE TO APPEAL THIS DECISION PLEASE PROVIDE THE PA TEAM WITH A LETTER OF MEDICAL NECESSITY      Patient Notified: NO

## 2025-04-02 NOTE — TELEPHONE ENCOUNTER
Please let her know that this is not covered by her insurance.   I don't have the documentation to appeal based on reasons needed for insurance to cover.   This is the same process that occurred in October 2024.   I don't think this will be a surprise.   Thank you.   Kristen Kehr PA-C

## 2025-04-17 DIAGNOSIS — E11.9 TYPE 2 DIABETES MELLITUS WITHOUT COMPLICATION, WITHOUT LONG-TERM CURRENT USE OF INSULIN (H): ICD-10-CM

## 2025-04-17 RX ORDER — METFORMIN HYDROCHLORIDE 500 MG/1
1000 TABLET, EXTENDED RELEASE ORAL 2 TIMES DAILY WITH MEALS
Qty: 360 TABLET | Refills: 0 | OUTPATIENT
Start: 2025-04-17

## 2025-04-17 NOTE — TELEPHONE ENCOUNTER
Left message on voicemail for patient to call clinic back.       Metformin was discontinued at the 1/25/25 office visit with Kristen Kehr, PA because patient was having loose stools.   Need to find out why patient is asking for a refill of this medication now.  Did she go back on it?      Kirstin HADDADN, RN

## 2025-04-17 NOTE — TELEPHONE ENCOUNTER
Patient called back to clinic. Confirmed that she is no longer taking Metformin for reasons documented by provider at 1/21/25 visit.  Okay to disregard refill request. Updated pharmacy that pt is no longer taking this medication.      Mónica Brothers RN  Clinical Triage/Primary Care  [facility]

## 2025-04-17 NOTE — TELEPHONE ENCOUNTER
Clinic RN: Please investigate patient's chart or contact patient if the information cannot be found because the medication is listed as historical or discontinued. Confirm patient is taking this medication. Document findings and route refill encounter to provider for approval or denial.    Matias Hernandez, RN, BSN, MSN  RN Lead

## 2025-05-03 ENCOUNTER — HEALTH MAINTENANCE LETTER (OUTPATIENT)
Age: 70
End: 2025-05-03

## 2025-07-15 ENCOUNTER — OFFICE VISIT (OUTPATIENT)
Dept: FAMILY MEDICINE | Facility: CLINIC | Age: 70
End: 2025-07-15
Payer: COMMERCIAL

## 2025-07-15 VITALS
WEIGHT: 150.8 LBS | SYSTOLIC BLOOD PRESSURE: 126 MMHG | DIASTOLIC BLOOD PRESSURE: 77 MMHG | TEMPERATURE: 98.4 F | BODY MASS INDEX: 26.72 KG/M2 | RESPIRATION RATE: 14 BRPM | OXYGEN SATURATION: 99 % | HEIGHT: 63 IN | HEART RATE: 83 BPM

## 2025-07-15 DIAGNOSIS — I10 ESSENTIAL HYPERTENSION: ICD-10-CM

## 2025-07-15 DIAGNOSIS — R80.9 TYPE 2 DIABETES MELLITUS WITH DIABETIC MICROALBUMINURIA, WITHOUT LONG-TERM CURRENT USE OF INSULIN (H): Primary | ICD-10-CM

## 2025-07-15 DIAGNOSIS — E11.29 TYPE 2 DIABETES MELLITUS WITH DIABETIC MICROALBUMINURIA, WITHOUT LONG-TERM CURRENT USE OF INSULIN (H): Primary | ICD-10-CM

## 2025-07-15 LAB
ANION GAP SERPL CALCULATED.3IONS-SCNC: 11 MMOL/L (ref 7–15)
BUN SERPL-MCNC: 16.1 MG/DL (ref 8–23)
CALCIUM SERPL-MCNC: 9.7 MG/DL (ref 8.8–10.4)
CHLORIDE SERPL-SCNC: 102 MMOL/L (ref 98–107)
CREAT SERPL-MCNC: 0.78 MG/DL (ref 0.51–0.95)
CREAT UR-MCNC: 101 MG/DL
EGFRCR SERPLBLD CKD-EPI 2021: 82 ML/MIN/1.73M2
EST. AVERAGE GLUCOSE BLD GHB EST-MCNC: 209 MG/DL
GLUCOSE SERPL-MCNC: 221 MG/DL (ref 70–99)
HBA1C MFR BLD: 8.9 % (ref 0–5.6)
HCO3 SERPL-SCNC: 25 MMOL/L (ref 22–29)
MICROALBUMIN UR-MCNC: 47.8 MG/L
MICROALBUMIN/CREAT UR: 47.33 MG/G CR (ref 0–25)
POTASSIUM SERPL-SCNC: 4.6 MMOL/L (ref 3.4–5.3)
SODIUM SERPL-SCNC: 138 MMOL/L (ref 135–145)

## 2025-07-15 PROCEDURE — 82570 ASSAY OF URINE CREATININE: CPT | Performed by: PHYSICIAN ASSISTANT

## 2025-07-15 PROCEDURE — 3074F SYST BP LT 130 MM HG: CPT | Performed by: PHYSICIAN ASSISTANT

## 2025-07-15 PROCEDURE — 3052F HG A1C>EQUAL 8.0%<EQUAL 9.0%: CPT | Performed by: PHYSICIAN ASSISTANT

## 2025-07-15 PROCEDURE — 99214 OFFICE O/P EST MOD 30 MIN: CPT | Performed by: PHYSICIAN ASSISTANT

## 2025-07-15 PROCEDURE — 80048 BASIC METABOLIC PNL TOTAL CA: CPT | Performed by: PHYSICIAN ASSISTANT

## 2025-07-15 PROCEDURE — G2211 COMPLEX E/M VISIT ADD ON: HCPCS | Performed by: PHYSICIAN ASSISTANT

## 2025-07-15 PROCEDURE — 82043 UR ALBUMIN QUANTITATIVE: CPT | Performed by: PHYSICIAN ASSISTANT

## 2025-07-15 PROCEDURE — 36415 COLL VENOUS BLD VENIPUNCTURE: CPT | Performed by: PHYSICIAN ASSISTANT

## 2025-07-15 PROCEDURE — 3078F DIAST BP <80 MM HG: CPT | Performed by: PHYSICIAN ASSISTANT

## 2025-07-15 PROCEDURE — 1126F AMNT PAIN NOTED NONE PRSNT: CPT | Performed by: PHYSICIAN ASSISTANT

## 2025-07-15 PROCEDURE — 83036 HEMOGLOBIN GLYCOSYLATED A1C: CPT | Performed by: PHYSICIAN ASSISTANT

## 2025-07-15 ASSESSMENT — PAIN SCALES - GENERAL: PAINLEVEL_OUTOF10: NO PAIN (0)

## 2025-07-15 NOTE — PATIENT INSTRUCTIONS
6 months annual medicare exam and diabetes check / fasting lab appointment      Schedule eye exam      Get immunizations at the pharmacy - COVID and shingles

## 2025-07-15 NOTE — PROGRESS NOTES
"  Assessment & Plan     Type 2 diabetes mellitus without complication, without long-term current use of insulin (H)  Update A1C and BMP today.   She will schedule an Eye exam also.   Continue with the semaglutide 1 mg for now. Adjust if A1C is above 8%.   I will see her back in 6 months for fasting lab tests and recheck of chronic health conditions / annual exam.   She has been doing well without the metformin.   - HEMOGLOBIN A1C; Future  - Albumin Random Urine Quantitative with Creat Ratio; Future  - Adult Eye  Referral; Future  - HEMOGLOBIN A1C  - Albumin Random Urine Quantitative with Creat Ratio    Essential hypertension  Stable  - BASIC METABOLIC PANEL; Future  - BASIC METABOLIC PANEL    The longitudinal plan of care for the diagnosis(es)/condition(s) as documented were addressed during this visit. Due to the added complexity in care, I will continue to support Janee in the subsequent management and with ongoing continuity of care.      BMI  Estimated body mass index is 27.14 kg/m  as calculated from the following:    Height as of this encounter: 1.588 m (5' 2.5\").    Weight as of this encounter: 68.4 kg (150 lb 12.8 oz).   Weight management plan: Discussed healthy diet and exercise guidelines          ADDENDUM: July 16, 2025  I spoke with Janee today about making a change with diabetes management.   She does not tolerate the metformin.   I am going to have her stay with the Ozempic 1 mg (she has 6 pens at home right now)  Start Jardiance 10 mg daily.   I will have her check an A1C and BMP in  months.   If A1C is still above 8%, then stop the ozempic and start mounjaro starting with 2.5 mg and working up on dosage from there.           Subjective   Janee is a 69 year old, presenting for the following health issues:  Diabetes        7/15/2025     9:49 AM   Additional Questions   Roomed by Nohelia   Accompanied by Self     History of Present Illness       Diabetes:   She presents for follow up of " "diabetes.   She is checking home blood glucose with a continuous glucose monitor.   She checks blood glucose before and after meals.  Blood glucose is sometimes over 200 and never under 70.  When her blood glucose is low, the patient is asymptomatic for confusion, blurred vision, lethargy and reports not feeling dizzy, shaky, or weak.  She is concerned about blood sugar frequently over 200.   She is having numbness in feet.  The patient has not had a diabetic eye exam in the last 12 months.          She eats 0-1 servings of fruits and vegetables daily.She consumes 0 sweetened beverage(s) daily.She exercises with enough effort to increase her heart rate 9 or less minutes per day.  She exercises with enough effort to increase her heart rate 3 or less days per week.   She is taking medications regularly.        Janee is feeling good and due for lab tests today.   She is here for management of type 2 diabetes and hypertension.   She has now been off of the metformin for 6 months and no longer having issues with her bowels. Previously, she was unable to go out and enjoy her grandchildren's sporting events, she is enjoying this summer. She is taking the ozempic 1 mg. She will notice a spike in her sugars after meals, but they will come down within a few hours. Her last A1C was 7.0%.               Review of Systems  Constitutional, HEENT, cardiovascular, pulmonary, GI, , musculoskeletal, neuro, skin, endocrine and psych systems are negative, except as otherwise noted.      Objective    /77   Pulse 83   Temp 98.4  F (36.9  C) (Tympanic)   Resp 14   Ht 1.588 m (5' 2.5\")   Wt 68.4 kg (150 lb 12.8 oz)   LMP  (LMP Unknown)   SpO2 99%   BMI 27.14 kg/m    Body mass index is 27.14 kg/m .  Physical Exam   GENERAL: alert and no distress  PSYCH: mentation appears normal, affect normal/bright  Diabetic foot exam: normal DP and PT pulses and no trophic changes or ulcerative lesions    Lab on 01/30/2025   Component Date " Value Ref Range Status    Estimated Average Glucose 01/30/2025 154 (H)  <117 mg/dL Final    Hemoglobin A1C 01/30/2025 7.0 (H)  0.0 - 5.6 % Final    Normal <5.7%   Prediabetes 5.7-6.4%    Diabetes 6.5% or higher     Note: Adopted from ADA consensus guidelines.    Cholesterol 01/30/2025 125  <200 mg/dL Final    Triglycerides 01/30/2025 114  <150 mg/dL Final    Direct Measure HDL 01/30/2025 58  >=50 mg/dL Final    LDL Cholesterol Calculated 01/30/2025 44  <100 mg/dL Final    Non HDL Cholesterol 01/30/2025 67  <130 mg/dL Final    Patient Fasting > 8hrs? 01/30/2025 Yes   Final           Signed Electronically by: Kristen M. Kehr, PA-C

## 2025-07-16 ENCOUNTER — PATIENT OUTREACH (OUTPATIENT)
Dept: CARE COORDINATION | Facility: CLINIC | Age: 70
End: 2025-07-16
Payer: COMMERCIAL

## 2025-08-26 ENCOUNTER — OFFICE VISIT (OUTPATIENT)
Dept: OPTOMETRY | Facility: CLINIC | Age: 70
End: 2025-08-26
Attending: PHYSICIAN ASSISTANT
Payer: COMMERCIAL

## 2025-08-26 DIAGNOSIS — H52.4 PRESBYOPIA: ICD-10-CM

## 2025-08-26 DIAGNOSIS — H52.03 HYPEROPIA, BILATERAL: ICD-10-CM

## 2025-08-26 DIAGNOSIS — H52.223 REGULAR ASTIGMATISM OF BOTH EYES: ICD-10-CM

## 2025-08-26 DIAGNOSIS — H25.13 NUCLEAR SCLEROTIC CATARACT OF BOTH EYES: ICD-10-CM

## 2025-08-26 DIAGNOSIS — E11.3293 TYPE 2 DIABETES MELLITUS WITH BOTH EYES AFFECTED BY MILD NONPROLIFERATIVE RETINOPATHY WITHOUT MACULAR EDEMA, WITHOUT LONG-TERM CURRENT USE OF INSULIN (H): ICD-10-CM

## 2025-08-26 DIAGNOSIS — Z01.01 VISION EXAM WITH ABNORMAL FINDINGS: Primary | ICD-10-CM

## 2025-08-26 PROCEDURE — 92015 DETERMINE REFRACTIVE STATE: CPT | Performed by: OPTOMETRIST

## 2025-08-26 PROCEDURE — 2022F DILAT RTA XM EVC RTNOPTHY: CPT | Performed by: OPTOMETRIST

## 2025-08-26 PROCEDURE — 92004 COMPRE OPH EXAM NEW PT 1/>: CPT | Performed by: OPTOMETRIST

## 2025-08-26 ASSESSMENT — VISUAL ACUITY
CORRECTION_TYPE: GLASSES
METHOD: SNELLEN - LINEAR
OS_CC: 20/40
OD_CC: 20/30
OD_SC: 20/25 -1
OD_PH_CC: 20/30
OS_PH_CC: 20/30
OS_SC: 20/30 -2

## 2025-08-26 ASSESSMENT — REFRACTION_MANIFEST
METHOD_AUTOREFRACTION: 1
OS_CYLINDER: +2.00
OD_AXIS: 025
OD_ADD: +2.50
OS_AXIS: 170
OD_SPHERE: -1.25
OD_AXIS: 049
OS_AXIS: 170
OS_SPHERE: -0.50
OD_SPHERE: -1.25
OS_CYLINDER: +2.00
OS_ADD: +2.50
OD_CYLINDER: +1.00
OD_CYLINDER: +0.50
OS_SPHERE: -1.00

## 2025-08-26 ASSESSMENT — TONOMETRY
OS_IOP_MMHG: 9
OD_IOP_MMHG: 9
IOP_METHOD: APPLANATION

## 2025-08-26 ASSESSMENT — REFRACTION_WEARINGRX
OS_SPHERE: -1.00
SPECS_TYPE: SVL DISTANCE
OD_AXIS: 005
OD_CYLINDER: +1.00
OD_SPHERE: -1.75
OS_AXIS: 160
OS_CYLINDER: +1.25

## 2025-08-26 ASSESSMENT — CONF VISUAL FIELD
OS_INFERIOR_TEMPORAL_RESTRICTION: 0
OD_SUPERIOR_TEMPORAL_RESTRICTION: 0
OD_NORMAL: 1
OD_INFERIOR_NASAL_RESTRICTION: 0
OD_SUPERIOR_NASAL_RESTRICTION: 0
OS_SUPERIOR_TEMPORAL_RESTRICTION: 0
OS_SUPERIOR_NASAL_RESTRICTION: 0
OD_INFERIOR_TEMPORAL_RESTRICTION: 0
OS_INFERIOR_NASAL_RESTRICTION: 0
OS_NORMAL: 1

## 2025-08-26 ASSESSMENT — EXTERNAL EXAM - RIGHT EYE: OD_EXAM: NORMAL

## 2025-08-26 ASSESSMENT — CUP TO DISC RATIO
OS_RATIO: 0.1
OD_RATIO: 0.1

## 2025-08-26 ASSESSMENT — EXTERNAL EXAM - LEFT EYE: OS_EXAM: NORMAL

## 2025-08-26 ASSESSMENT — SLIT LAMP EXAM - LIDS
COMMENTS: NORMAL
COMMENTS: NORMAL

## (undated) DEVICE — PREP CHLORAPREP 26ML TINTED ORANGE  260815

## (undated) DEVICE — DRAPE C-ARM 60X42" 1013

## (undated) DEVICE — GLOVE BIOGEL PI ULTRATOUCH G SZ 7.5 42175

## (undated) DEVICE — SOL WATER IRRIG 1000ML BOTTLE 07139-09

## (undated) DEVICE — Device

## (undated) DEVICE — NDL 19GA 1.5"

## (undated) DEVICE — SUTURE PASSING WIRE

## (undated) DEVICE — DRAPE STERI U 1015

## (undated) DEVICE — GUIDEWIRE TROCAR TIP 1.35MM AR-8737-01

## (undated) DEVICE — GLOVE BIOGEL PI ULTRATOUCH G SZ 8.5 42185

## (undated) DEVICE — DRILL BIT ARTHREX LPS CAN 2.0MM AR-8933-20C

## (undated) DEVICE — CAST PADDING 6" STERILE 9046S

## (undated) DEVICE — GLOVE BIOGEL PI MICRO INDICATOR UNDERGLOVE SZ 7.5 48975

## (undated) DEVICE — SU FIBERWIRE 2 38"  AR-7200

## (undated) DEVICE — NDL 22GA 1.5"

## (undated) DEVICE — SOL PRP PVP IOD .75OZ PCH PKT CNTNR STRL DYNDA2232A

## (undated) DEVICE — PACK EXTREMITY SOP15EXFSD

## (undated) DEVICE — CAST PLASTER SPLINT 5X30" 7395

## (undated) DEVICE — DRSG AQUACEL AG HYDROFIBER  3.5X10" 422605

## (undated) DEVICE — GLOVE BIOGEL PI MICRO INDICATOR UNDERGLOVE SZ 8.0 48980

## (undated) DEVICE — SU VICRYL 2-0 SH 27" UND J417H

## (undated) DEVICE — ADH SKIN CLOSURE PREMIERPRO EXOFIN 1.0ML 3470

## (undated) DEVICE — SU MONOCRYL 3-0 PS-2 27" Y427H

## (undated) DEVICE — DRILL BIT ARTHREX CAN 2.5MM AR-8737-09

## (undated) DEVICE — BNDG ELASTIC 6"X5YDS UNSTERILE 6611-60

## (undated) DEVICE — ESU GROUND PAD ADULT W/CORD E7507

## (undated) DEVICE — DRILL BIT ARTHREX CAN 2.6MM AR-8943-02

## (undated) RX ORDER — CLINDAMYCIN PHOSPHATE 900 MG/50ML
INJECTION, SOLUTION INTRAVENOUS
Status: DISPENSED
Start: 2023-04-14

## (undated) RX ORDER — SIMETHICONE 40MG/0.6ML
SUSPENSION, DROPS(FINAL DOSAGE FORM)(ML) ORAL
Status: DISPENSED
Start: 2024-12-03

## (undated) RX ORDER — BUPIVACAINE HYDROCHLORIDE 2.5 MG/ML
INJECTION, SOLUTION INFILTRATION; PERINEURAL
Status: DISPENSED
Start: 2023-04-14

## (undated) RX ORDER — ONDANSETRON 2 MG/ML
INJECTION INTRAMUSCULAR; INTRAVENOUS
Status: DISPENSED
Start: 2023-04-14

## (undated) RX ORDER — PROPOFOL 10 MG/ML
INJECTION, EMULSION INTRAVENOUS
Status: DISPENSED
Start: 2023-04-14

## (undated) RX ORDER — CELECOXIB 200 MG/1
CAPSULE ORAL
Status: DISPENSED
Start: 2023-04-14

## (undated) RX ORDER — PHENYLEPHRINE HYDROCHLORIDE 10 MG/ML
INJECTION INTRAVENOUS
Status: DISPENSED
Start: 2023-04-14

## (undated) RX ORDER — ONDANSETRON 2 MG/ML
INJECTION INTRAMUSCULAR; INTRAVENOUS
Status: DISPENSED
Start: 2024-12-03

## (undated) RX ORDER — FENTANYL CITRATE 50 UG/ML
INJECTION, SOLUTION INTRAMUSCULAR; INTRAVENOUS
Status: DISPENSED
Start: 2024-12-03

## (undated) RX ORDER — FENTANYL CITRATE-0.9 % NACL/PF 10 MCG/ML
PLASTIC BAG, INJECTION (ML) INTRAVENOUS
Status: DISPENSED
Start: 2023-04-14

## (undated) RX ORDER — LIDOCAINE HYDROCHLORIDE 20 MG/ML
INJECTION, SOLUTION INFILTRATION; PERINEURAL
Status: DISPENSED
Start: 2023-04-14

## (undated) RX ORDER — BUPIVACAINE HYDROCHLORIDE 2.5 MG/ML
INJECTION, SOLUTION EPIDURAL; INFILTRATION; INTRACAUDAL
Status: DISPENSED
Start: 2023-04-14

## (undated) RX ORDER — CEFAZOLIN SODIUM 1 G/3ML
INJECTION, POWDER, FOR SOLUTION INTRAMUSCULAR; INTRAVENOUS
Status: DISPENSED
Start: 2023-04-14

## (undated) RX ORDER — ACETAMINOPHEN 325 MG/1
TABLET ORAL
Status: DISPENSED
Start: 2023-04-14

## (undated) RX ORDER — SCOLOPAMINE TRANSDERMAL SYSTEM 1 MG/1
PATCH, EXTENDED RELEASE TRANSDERMAL
Status: DISPENSED
Start: 2023-04-14

## (undated) RX ORDER — FENTANYL CITRATE 50 UG/ML
INJECTION, SOLUTION INTRAMUSCULAR; INTRAVENOUS
Status: DISPENSED
Start: 2023-04-14